# Patient Record
Sex: MALE | Race: WHITE | NOT HISPANIC OR LATINO | Employment: OTHER | URBAN - NONMETROPOLITAN AREA
[De-identification: names, ages, dates, MRNs, and addresses within clinical notes are randomized per-mention and may not be internally consistent; named-entity substitution may affect disease eponyms.]

---

## 2017-06-07 DIAGNOSIS — R97.20 ELEVATED PROSTATE SPECIFIC ANTIGEN (PSA): ICD-10-CM

## 2017-06-07 DIAGNOSIS — Z12.5 ENCOUNTER FOR SCREENING FOR MALIGNANT NEOPLASM OF PROSTATE: ICD-10-CM

## 2017-06-07 LAB — PSA SERPL-MCNC: 5.85 NG/ML (ref 0–4)

## 2017-06-29 ENCOUNTER — OFFICE VISIT (OUTPATIENT)
Dept: UROLOGY | Facility: CLINIC | Age: 72
End: 2017-06-29

## 2017-06-29 VITALS — TEMPERATURE: 97.9 F | WEIGHT: 186 LBS | BODY MASS INDEX: 28.19 KG/M2 | HEIGHT: 68 IN

## 2017-06-29 DIAGNOSIS — R97.20 ELEVATED PROSTATE SPECIFIC ANTIGEN (PSA): Primary | ICD-10-CM

## 2017-06-29 DIAGNOSIS — N52.9 ERECTILE DYSFUNCTION, UNSPECIFIED ERECTILE DYSFUNCTION TYPE: ICD-10-CM

## 2017-06-29 LAB
BILIRUB BLD-MCNC: NEGATIVE MG/DL
CLARITY, POC: CLEAR
COLOR UR: YELLOW
GLUCOSE UR STRIP-MCNC: NEGATIVE MG/DL
KETONES UR QL: NEGATIVE
LEUKOCYTE EST, POC: NEGATIVE
NITRITE UR-MCNC: NEGATIVE MG/ML
PH UR: 6 [PH] (ref 5–8)
PROT UR STRIP-MCNC: ABNORMAL MG/DL
RBC # UR STRIP: NEGATIVE /UL
SP GR UR: 1.02 (ref 1–1.03)
UROBILINOGEN UR QL: NORMAL

## 2017-06-29 PROCEDURE — 99214 OFFICE O/P EST MOD 30 MIN: CPT | Performed by: UROLOGY

## 2017-06-29 PROCEDURE — 81003 URINALYSIS AUTO W/O SCOPE: CPT | Performed by: UROLOGY

## 2017-06-29 NOTE — PROGRESS NOTES
Mr. Piedra is 72 y.o. male    CHIEF COMPLAINT: I am here for elevated psa    HPI  Elevated PSA  The patient presents with a presumed abnormality of the prostate gland, that is an elevated PSA.  This has been found in the context of Prior PSA elevation. Severity is best defined by the PSA level of 5.8 ng/ml. This test was done approximately 3weeks ago. Associated voiding symptom assessment reveals Frequency and Intermittency. He has undergone a prior prostate biopsy that was negative..       Previous PSA values include :   Lab Results   Component Value Date    PSA 5.850 (H) 06/07/2017      7.4      05/17     6.8      11/17     4.9      05/16        Lab Results   Component Value Date    PSA 5.850 (H) 06/07/2017     Erectile Dysfunction  Patient complains of erectile dysfunction. Onset of dysfunction was approximately  3 years ago and was gradual in onset.  Patient states the nature of difficulty is both attaining and maintaining erection.   Libido is not affected. Associated features include Hypertension. Previous treatment ofED include(s) Cialis   somewhat effective but it is cost prohibitive.    The following portions of the patient's history were reviewed and updated as appropriate: allergies, current medications, past family history, past medical history, past social history, past surgical history and problem list.    Review of Systems   Constitutional: Negative for chills and fever.   Gastrointestinal: Negative for abdominal pain, anal bleeding and blood in stool.   Genitourinary: Negative for flank pain and hematuria.         Current Outpatient Prescriptions:   •  aspirin 81 MG EC tablet, Take 81 mg by mouth Daily., Disp: , Rfl:   •  CIALIS 20 MG tablet, , Disp: , Rfl:   •  coenzyme Q10 100 MG capsule, Take 100 mg by mouth Daily., Disp: , Rfl:   •  hydrochlorothiazide (HYDRODIURIL) 25 MG tablet, , Disp: , Rfl:   •  irbesartan (AVAPRO) 300 MG tablet, , Disp: , Rfl:   •  lidocaine (XYLOCAINE) 2 % jelly, Apply  "to affected area daily prn, Disp: 30 mL, Rfl: 1  •  simvastatin (ZOCOR) 10 MG tablet, , Disp: , Rfl:     Past Medical History:   Diagnosis Date   • High cholesterol    • Hypertension        Past Surgical History:   Procedure Laterality Date   • HERNIA REPAIR      abdominal   • ROTATOR CUFF REPAIR     • TONSILLECTOMY         Social History     Social History   • Marital status:      Spouse name: N/A   • Number of children: N/A   • Years of education: N/A     Social History Main Topics   • Smoking status: Never Smoker   • Smokeless tobacco: None   • Alcohol use Yes   • Drug use: None   • Sexual activity: Not Asked     Other Topics Concern   • None     Social History Narrative       Family History   Problem Relation Age of Onset   • No Known Problems Father    • No Known Problems Mother        Temp 97.9 °F (36.6 °C)  Ht 68\" (172.7 cm)  Wt 186 lb (84.4 kg)  BMI 28.28 kg/m2    Physical Exam  Constitutional: The patient  is oriented to person, place, and time. They  appear well-developed and well-nourished. No distress.   Pulmonary/Chest: Effort normal.   Abdominal: Soft. The patient exhibits no distension and no mass. There is no tenderness. There is no rebound and no guarding. No hernia.   Neurological: Patient is alert and oriented to person, place, and time.   Skin: Skin is warm and dry. Not diaphoretic.   Psychiatric:  normal mood and affect.   Vitals reviewed.      Results for orders placed or performed in visit on 06/29/17   POC Urinalysis Dipstick, Automated   Result Value Ref Range    Color Yellow Yellow, Straw, Dark Yellow, Soni    Clarity, UA Clear Clear    Glucose, UA Negative Negative, 1000 mg/dL (3+) mg/dL    Bilirubin Negative Negative    Ketones, UA Negative Negative    Specific Gravity  1.025 1.005 - 1.030    Blood, UA Negative Negative    pH, Urine 6.0 5.0 - 8.0    Protein, POC Trace (A) Negative mg/dL    Urobilinogen, UA Normal Normal    Leukocytes Negative Negative    Nitrite, UA Negative " "Negative       Imaging Results (last 7 days)     ** No results found for the last 168 hours. **          Assessment and Plan  Diagnoses and all orders for this visit:    Elevated prostate specific antigen (PSA)  -     POC Urinalysis Dipstick, Automated  -     PSA; Future    Erectile dysfunction, unspecified erectile dysfunction type    He has two chronic Urologic issues with the latter being new to me.     #1. His PSA has improved. He had a biopsy done in December.     #2. The patient and I discussed the likely etiology of his impotence.  We discussed diagnostic evaluation for erectile dysfunction is possible but usually does not change the management.  He understands that goal directed therapy is probably the best approach since no matter what the etiology, a PDE 5 inhibitor is the least invasive way to manage ED.  While there are risks to PDE 5 inhibitors which I discussed as described below, it was relayed to him that they probably provide the most \"natural\" erection.  Other options discussed included penile injections, urethral suppositories, vacuum erection device without or without the tension ring, and penile prosthesis.  The risks of headache, visual changes, hypotension, priapism, GI distress, myalgias, and the contraindication of nitrates were discussed with the patient.  The patient denies a history of nitrates either in long acting or prn use form.  He denies a history of MI, heart failure, or stroke in the last 6 months.  I discussed with him that alpha blockers should be taken at least 4 hours apart from the PDE 5 inhibitors.  He was given a prescription for sildenafil troches. I reminded him that facial flushing is not usual with this medication, and that it is most effective when taken on an empty stomach without alcohol.  He understands that he needs to be forthright about using this medication in any medical situation, especially when giving nitrates is being considered.    Chemo Tierney, " MD  06/29/17  12:04 PM      Cc:

## 2017-07-24 ENCOUNTER — HOSPITAL ENCOUNTER (OUTPATIENT)
Dept: GENERAL RADIOLOGY | Facility: HOSPITAL | Age: 72
Discharge: HOME | End: 2017-07-24
Attending: INTERNAL MEDICINE | Admitting: INTERNAL MEDICINE

## 2017-07-24 ENCOUNTER — TELEPHONE (OUTPATIENT)
Dept: INTERNAL MEDICINE | Age: 72
End: 2017-07-24

## 2017-07-24 ENCOUNTER — TRANSCRIBE ORDERS (OUTPATIENT)
Dept: ADMINISTRATIVE | Facility: HOSPITAL | Age: 72
End: 2017-07-24

## 2017-07-24 DIAGNOSIS — J45.909 INTRINSIC ASTHMA, UNSPECIFIED: Primary | ICD-10-CM

## 2017-07-24 PROCEDURE — 71020 HC CHEST PA AND LATERAL: CPT

## 2017-07-24 RX ORDER — ALBUTEROL SULFATE 90 UG/1
2 AEROSOL, METERED RESPIRATORY (INHALATION) EVERY 4 HOURS PRN
Qty: 1 INHALER | Refills: 3 | Status: SHIPPED | OUTPATIENT
Start: 2017-07-24

## 2017-07-25 ENCOUNTER — LAB (OUTPATIENT)
Dept: LAB | Facility: HOSPITAL | Age: 72
End: 2017-07-25
Attending: INTERNAL MEDICINE

## 2017-07-25 DIAGNOSIS — J45.909 INTRINSIC ASTHMA, UNSPECIFIED: ICD-10-CM

## 2017-07-25 PROCEDURE — 87102 FUNGUS ISOLATION CULTURE: CPT | Performed by: INTERNAL MEDICINE

## 2017-07-25 PROCEDURE — 87205 SMEAR GRAM STAIN: CPT | Performed by: INTERNAL MEDICINE

## 2017-07-25 PROCEDURE — 87116 MYCOBACTERIA CULTURE: CPT | Performed by: INTERNAL MEDICINE

## 2017-07-25 PROCEDURE — 87070 CULTURE OTHR SPECIMN AEROBIC: CPT | Performed by: INTERNAL MEDICINE

## 2017-07-25 PROCEDURE — 87206 SMEAR FLUORESCENT/ACID STAI: CPT | Performed by: INTERNAL MEDICINE

## 2017-07-26 RX ORDER — CETIRIZINE HCL/PSEUDOEPHEDRINE 5 MG-120MG
1 TABLET, EXTENDED RELEASE 12 HR ORAL DAILY PRN
Refills: 0 | COMMUNITY
Start: 2017-06-11 | End: 2017-08-01

## 2017-07-26 RX ORDER — HYDROCHLOROTHIAZIDE 25 MG/1
1 TABLET ORAL DAILY
COMMUNITY
Start: 2017-07-11 | End: 2018-04-07 | Stop reason: SDUPTHER

## 2017-07-26 RX ORDER — TADALAFIL 20 MG
0.5 TABLET ORAL DAILY PRN
COMMUNITY
Start: 2017-06-01 | End: 2018-01-02

## 2017-07-26 RX ORDER — SIMVASTATIN 10 MG
1 TABLET ORAL DAILY
COMMUNITY
Start: 2017-07-11 | End: 2018-01-18 | Stop reason: SDUPTHER

## 2017-07-26 RX ORDER — IRBESARTAN 300 MG/1
1 TABLET ORAL DAILY
COMMUNITY
Start: 2017-07-11 | End: 2018-04-07 | Stop reason: SDUPTHER

## 2017-07-27 LAB
BACTERIA SPEC RESP CULT: NORMAL
GRAM STN SPEC: NORMAL

## 2017-08-01 ENCOUNTER — OFFICE VISIT (OUTPATIENT)
Dept: INTERNAL MEDICINE | Age: 72
End: 2017-08-01
Payer: MEDICARE

## 2017-08-01 VITALS
SYSTOLIC BLOOD PRESSURE: 126 MMHG | BODY MASS INDEX: 27.55 KG/M2 | OXYGEN SATURATION: 96 % | HEART RATE: 94 BPM | HEIGHT: 69 IN | WEIGHT: 186 LBS | DIASTOLIC BLOOD PRESSURE: 62 MMHG

## 2017-08-01 DIAGNOSIS — J30.9 ALLERGIC RHINITIS, UNSPECIFIED ALLERGIC RHINITIS TRIGGER, UNSPECIFIED RHINITIS SEASONALITY: ICD-10-CM

## 2017-08-01 DIAGNOSIS — J45.30 ASTHMATIC BRONCHITIS, MILD PERSISTENT, UNCOMPLICATED: ICD-10-CM

## 2017-08-01 DIAGNOSIS — I10 ESSENTIAL HYPERTENSION: Primary | ICD-10-CM

## 2017-08-01 PROCEDURE — 1036F TOBACCO NON-USER: CPT | Performed by: INTERNAL MEDICINE

## 2017-08-01 PROCEDURE — 4040F PNEUMOC VAC/ADMIN/RCVD: CPT | Performed by: INTERNAL MEDICINE

## 2017-08-01 PROCEDURE — 99214 OFFICE O/P EST MOD 30 MIN: CPT | Performed by: INTERNAL MEDICINE

## 2017-08-01 PROCEDURE — G8427 DOCREV CUR MEDS BY ELIG CLIN: HCPCS | Performed by: INTERNAL MEDICINE

## 2017-08-01 PROCEDURE — 1123F ACP DISCUSS/DSCN MKR DOCD: CPT | Performed by: INTERNAL MEDICINE

## 2017-08-01 PROCEDURE — G8419 CALC BMI OUT NRM PARAM NOF/U: HCPCS | Performed by: INTERNAL MEDICINE

## 2017-08-01 PROCEDURE — 3017F COLORECTAL CA SCREEN DOC REV: CPT | Performed by: INTERNAL MEDICINE

## 2017-08-01 RX ORDER — FEXOFENADINE HCL 180 MG/1
180 TABLET ORAL DAILY
COMMUNITY

## 2017-08-01 ASSESSMENT — ENCOUNTER SYMPTOMS
RHINORRHEA: 0
COUGH: 0
SHORTNESS OF BREATH: 0
ABDOMINAL PAIN: 0
WHEEZING: 1
NAUSEA: 0
SORE THROAT: 0
TROUBLE SWALLOWING: 0

## 2017-08-11 ENCOUNTER — TRANSCRIBE ORDERS (OUTPATIENT)
Dept: ADMINISTRATIVE | Facility: HOSPITAL | Age: 72
End: 2017-08-11

## 2017-08-11 DIAGNOSIS — J98.4 APICAL LUNG SCARRING: ICD-10-CM

## 2017-08-11 DIAGNOSIS — J98.4 OTHER DISEASES OF LUNG, NOT ELSEWHERE CLASSIFIED: Primary | ICD-10-CM

## 2017-08-30 ENCOUNTER — HOSPITAL ENCOUNTER (OUTPATIENT)
Dept: CT IMAGING | Facility: HOSPITAL | Age: 72
Discharge: HOME | End: 2017-08-30
Admitting: NURSE PRACTITIONER

## 2017-08-30 ENCOUNTER — APPOINTMENT (OUTPATIENT)
Dept: CT IMAGING | Facility: HOSPITAL | Age: 72
End: 2017-08-30

## 2017-08-30 DIAGNOSIS — J98.4 APICAL LUNG SCARRING: ICD-10-CM

## 2017-08-30 PROCEDURE — 71250 CT THORAX DX C-: CPT

## 2017-09-05 LAB
FUNGUS WND CULT: NORMAL
MYCOBACTERIUM SPEC CULT: NORMAL
NIGHT BLUE STAIN TISS: NORMAL
NIGHT BLUE STAIN TISS: NORMAL

## 2017-11-06 DIAGNOSIS — E78.00 PURE HYPERCHOLESTEROLEMIA: Primary | ICD-10-CM

## 2017-11-07 DIAGNOSIS — E78.00 PURE HYPERCHOLESTEROLEMIA: ICD-10-CM

## 2017-11-07 LAB
ALBUMIN SERPL-MCNC: 4.4 G/DL (ref 3.5–5.2)
ALP BLD-CCNC: 167 U/L (ref 40–130)
ALT SERPL-CCNC: 46 U/L (ref 5–41)
ANION GAP SERPL CALCULATED.3IONS-SCNC: 19 MMOL/L (ref 7–19)
AST SERPL-CCNC: 35 U/L (ref 5–40)
BILIRUB SERPL-MCNC: 1.3 MG/DL (ref 0.2–1.2)
BUN BLDV-MCNC: 19 MG/DL (ref 8–23)
CALCIUM SERPL-MCNC: 9.5 MG/DL (ref 8.8–10.2)
CHLORIDE BLD-SCNC: 101 MMOL/L (ref 98–111)
CO2: 25 MMOL/L (ref 22–29)
CREAT SERPL-MCNC: 0.8 MG/DL (ref 0.5–1.2)
GFR NON-AFRICAN AMERICAN: >60
GLUCOSE BLD-MCNC: 96 MG/DL (ref 74–109)
LDL CHOLESTEROL DIRECT: 69 MG/DL
POTASSIUM SERPL-SCNC: 4.4 MMOL/L (ref 3.5–5)
SODIUM BLD-SCNC: 145 MMOL/L (ref 136–145)
TOTAL PROTEIN: 7.7 G/DL (ref 6.6–8.7)

## 2017-12-22 DIAGNOSIS — R97.20 ELEVATED PROSTATE SPECIFIC ANTIGEN (PSA): Primary | ICD-10-CM

## 2017-12-22 LAB — PSA SERPL-MCNC: 6.52 NG/ML (ref 0–4)

## 2017-12-26 ENCOUNTER — RESULTS ENCOUNTER (OUTPATIENT)
Dept: UROLOGY | Facility: CLINIC | Age: 72
End: 2017-12-26

## 2017-12-26 DIAGNOSIS — R97.20 ELEVATED PROSTATE SPECIFIC ANTIGEN (PSA): ICD-10-CM

## 2018-01-02 ENCOUNTER — OFFICE VISIT (OUTPATIENT)
Dept: INTERNAL MEDICINE | Age: 73
End: 2018-01-02
Payer: MEDICARE

## 2018-01-02 VITALS
HEIGHT: 68 IN | HEART RATE: 75 BPM | DIASTOLIC BLOOD PRESSURE: 72 MMHG | SYSTOLIC BLOOD PRESSURE: 108 MMHG | BODY MASS INDEX: 30.08 KG/M2 | OXYGEN SATURATION: 98 % | WEIGHT: 198.5 LBS

## 2018-01-02 DIAGNOSIS — Z23 NEED FOR PROPHYLACTIC VACCINATION AGAINST DIPHTHERIA-TETANUS-PERTUSSIS (DTP): ICD-10-CM

## 2018-01-02 DIAGNOSIS — I10 ESSENTIAL HYPERTENSION: ICD-10-CM

## 2018-01-02 DIAGNOSIS — E66.3 PATIENT OVERWEIGHT: ICD-10-CM

## 2018-01-02 DIAGNOSIS — R97.20 ELEVATED PSA: ICD-10-CM

## 2018-01-02 DIAGNOSIS — Z23 NEED FOR TETANUS BOOSTER: ICD-10-CM

## 2018-01-02 DIAGNOSIS — E78.00 PURE HYPERCHOLESTEROLEMIA: ICD-10-CM

## 2018-01-02 PROCEDURE — 99214 OFFICE O/P EST MOD 30 MIN: CPT | Performed by: INTERNAL MEDICINE

## 2018-01-02 RX ORDER — FLUTICASONE PROPIONATE 50 MCG
1 SPRAY, SUSPENSION (ML) NASAL DAILY
COMMUNITY

## 2018-01-02 RX ORDER — INFLUENZA A VIRUS A/MICHIGAN/45/2015 X-275 (H1N1) ANTIGEN (FORMALDEHYDE INACTIVATED), INFLUENZA A VIRUS A/SINGAPORE/INFIMH-16-0019/2016 IVR-186 (H3N2) ANTIGEN (FORMALDEHYDE INACTIVATED), AND INFLUENZA B VIRUS B/MARYLAND/15/2016 BX-69A (A B/COLORADO/6/2017-LIKE VIRUS) ANTIGEN (FORMALDEHYDE INACTIVATED) 60; 60; 60 UG/.5ML; UG/.5ML; UG/.5ML
INJECTION, SUSPENSION INTRAMUSCULAR
Refills: 0 | COMMUNITY
Start: 2017-10-03

## 2018-01-02 RX ORDER — ZOSTER VACCINE LIVE 19400 [PFU]/.65ML
INJECTION, POWDER, LYOPHILIZED, FOR SUSPENSION SUBCUTANEOUS
COMMUNITY
Start: 2017-12-20

## 2018-01-02 RX ORDER — SILDENAFIL 100 MG/1
100 TABLET, FILM COATED ORAL PRN
COMMUNITY

## 2018-01-02 ASSESSMENT — ENCOUNTER SYMPTOMS
COUGH: 0
SHORTNESS OF BREATH: 0
TROUBLE SWALLOWING: 0
NAUSEA: 0
RHINORRHEA: 0
ABDOMINAL PAIN: 0
SORE THROAT: 0

## 2018-01-02 ASSESSMENT — PATIENT HEALTH QUESTIONNAIRE - PHQ9
1. LITTLE INTEREST OR PLEASURE IN DOING THINGS: 0
SUM OF ALL RESPONSES TO PHQ9 QUESTIONS 1 & 2: 0
SUM OF ALL RESPONSES TO PHQ QUESTIONS 1-9: 0
2. FEELING DOWN, DEPRESSED OR HOPELESS: 0

## 2018-01-02 NOTE — PATIENT INSTRUCTIONS
Patient Education        Heart-Healthy Diet: Care Instructions  Your Care Instructions    A heart-healthy diet has lots of vegetables, fruits, nuts, beans, and whole grains, and is low in salt. It limits foods that are high in saturated fat, such as meats, cheeses, and fried foods. It may be hard to change your diet, but even small changes can lower your risk of heart attack and heart disease. Follow-up care is a key part of your treatment and safety. Be sure to make and go to all appointments, and call your doctor if you are having problems. It's also a good idea to know your test results and keep a list of the medicines you take. How can you care for yourself at home? Watch your portions  · Learn what a serving is. A \"serving\" and a \"portion\" are not always the same thing. Make sure that you are not eating larger portions than are recommended. For example, a serving of pasta is ½ cup. A serving size of meat is 2 to 3 ounces. A 3-ounce serving is about the size of a deck of cards. Measure serving sizes until you are good at [place]" them. Keep in mind that restaurants often serve portions that are 2 or 3 times the size of one serving. · To keep your energy level up and keep you from feeling hungry, eat often but in smaller portions. · Eat only the number of calories you need to stay at a healthy weight. If you need to lose weight, eat fewer calories than your body burns (through exercise and other physical activity). Eat more fruits and vegetables  · Eat a variety of fruit and vegetables every day. Dark green, deep orange, red, or yellow fruits and vegetables are especially good for you. Examples include spinach, carrots, peaches, and berries. · Keep carrots, celery, and other veggies handy for snacks. Buy fruit that is in season and store it where you can see it so that you will be tempted to eat it. · Cook dishes that have a lot of veggies in them, such as stir-fries and soups.   Limit saturated and trans fat  · Read food labels, and try to avoid saturated and trans fats. They increase your risk of heart disease. Trans fat is found in many processed foods such as cookies and crackers. · Use olive or canola oil when you cook. Try cholesterol-lowering spreads, such as Benecol or Take Control. · Bake, broil, grill, or steam foods instead of frying them. · Choose lean meats instead of high-fat meats such as hot dogs and sausages. Cut off all visible fat when you prepare meat. · Eat fish, skinless poultry, and meat alternatives such as soy products instead of high-fat meats. Soy products, such as tofu, may be especially good for your heart. · Choose low-fat or fat-free milk and dairy products. Eat fish  · Eat at least two servings of fish a week. Certain fish, such as salmon and tuna, contain omega-3 fatty acids, which may help reduce your risk of heart attack. Eat foods high in fiber  · Eat a variety of grain products every day. Include whole-grain foods that have lots of fiber and nutrients. Examples of whole-grain foods include oats, whole wheat bread, and brown rice. · Buy whole-grain breads and cereals, instead of white bread or pastries. Limit salt and sodium  · Limit how much salt and sodium you eat to help lower your blood pressure. · Taste food before you salt it. Add only a little salt when you think you need it. With time, your taste buds will adjust to less salt. · Eat fewer snack items, fast foods, and other high-salt, processed foods. Check food labels for the amount of sodium in packaged foods. · Choose low-sodium versions of canned goods (such as soups, vegetables, and beans). Limit sugar  · Limit drinks and foods with added sugar. These include candy, desserts, and soda pop. Limit alcohol  · Limit alcohol to no more than 2 drinks a day for men and 1 drink a day for women. Too much alcohol can cause health problems. When should you call for help?   Watch closely for changes in your

## 2018-01-11 ENCOUNTER — OFFICE VISIT (OUTPATIENT)
Dept: UROLOGY | Facility: CLINIC | Age: 73
End: 2018-01-11

## 2018-01-11 VITALS — BODY MASS INDEX: 30.01 KG/M2 | HEIGHT: 68 IN | TEMPERATURE: 97 F | WEIGHT: 198 LBS

## 2018-01-11 DIAGNOSIS — N52.9 ERECTILE DYSFUNCTION, UNSPECIFIED ERECTILE DYSFUNCTION TYPE: ICD-10-CM

## 2018-01-11 DIAGNOSIS — R97.20 ELEVATED PROSTATE SPECIFIC ANTIGEN (PSA): Primary | ICD-10-CM

## 2018-01-11 LAB
BILIRUB BLD-MCNC: NEGATIVE MG/DL
CLARITY, POC: CLEAR
COLOR UR: YELLOW
GLUCOSE UR STRIP-MCNC: NEGATIVE MG/DL
KETONES UR QL: ABNORMAL
LEUKOCYTE EST, POC: NEGATIVE
NITRITE UR-MCNC: NEGATIVE MG/ML
PH UR: 6.5 [PH] (ref 5–8)
PROT UR STRIP-MCNC: ABNORMAL MG/DL
RBC # UR STRIP: NEGATIVE /UL
SP GR UR: 1.02 (ref 1–1.03)
UROBILINOGEN UR QL: NORMAL

## 2018-01-11 PROCEDURE — 81003 URINALYSIS AUTO W/O SCOPE: CPT | Performed by: UROLOGY

## 2018-01-11 PROCEDURE — 99213 OFFICE O/P EST LOW 20 MIN: CPT | Performed by: UROLOGY

## 2018-01-11 NOTE — PROGRESS NOTES
Mr. Piedra is 72 y.o. male    CHIEF COMPLAINT: I am here to follow up on my elevated ps    HPI  Elevated PSA  The patient presents with a presumed abnormality of the prostate gland, that is an elevated PSA.  This has been found in the context of Prior PSA elevation. Severity is best defined by the PSA level of 6.5 ng/ml. This test was done approximately 1week ago. Associated voiding symptom assessment reveals Frequency. He is undergone a standard 12 core template biopsy that was negative..       Previous PSA values include :   Lab Results   Component Value Date    PSA 6.520 (H) 12/22/2017    PSA 5.850 (H) 06/07/2017         Lab Results   Component Value Date    PSA 6.520 (H) 12/22/2017    PSA 5.850 (H) 06/07/2017      6.8      11/16     4.7      11/15  He has used the sildenafil troches for erectile dysfunction.  He has good success with this.    The following portions of the patient's history were reviewed and updated as appropriate: allergies, current medications, past family history, past medical history, past social history, past surgical history and problem list.    Review of Systems   Constitutional: Negative for chills and fever.   Gastrointestinal: Negative for abdominal pain, anal bleeding and blood in stool.   Genitourinary: Negative for flank pain and hematuria.         Current Outpatient Prescriptions:   •  aspirin 81 MG EC tablet, Take 81 mg by mouth Daily., Disp: , Rfl:   •  CIALIS 20 MG tablet, , Disp: , Rfl:   •  coenzyme Q10 100 MG capsule, Take 100 mg by mouth Daily., Disp: , Rfl:   •  hydrochlorothiazide (HYDRODIURIL) 25 MG tablet, , Disp: , Rfl:   •  irbesartan (AVAPRO) 300 MG tablet, , Disp: , Rfl:   •  lidocaine (XYLOCAINE) 2 % jelly, Apply to affected area daily prn, Disp: 30 mL, Rfl: 1  •  simvastatin (ZOCOR) 10 MG tablet, , Disp: , Rfl:     Past Medical History:   Diagnosis Date   • High cholesterol    • Hypertension        Past Surgical History:   Procedure Laterality Date   • HERNIA  "REPAIR      abdominal   • PROSTATE ULTRASOUND BIOPSY  12/2016    negative   • ROTATOR CUFF REPAIR     • TONSILLECTOMY         Social History     Social History   • Marital status:      Spouse name: N/A   • Number of children: N/A   • Years of education: N/A     Social History Main Topics   • Smoking status: Never Smoker   • Smokeless tobacco: Never Used   • Alcohol use Yes   • Drug use: None   • Sexual activity: Not Asked     Other Topics Concern   • None     Social History Narrative       Family History   Problem Relation Age of Onset   • No Known Problems Father    • No Known Problems Mother        Temp 97 °F (36.1 °C)  Ht 172.7 cm (68\")  Wt 89.8 kg (198 lb)  BMI 30.11 kg/m2    Physical Exam  Alert and oriented ×3  Not agitated or distressed  No obvious deformities  No respiratory distress  Skin without pallor or diaphoresis      Results for orders placed or performed in visit on 01/11/18   POC Urinalysis Dipstick, Automated   Result Value Ref Range    Color Yellow Yellow, Straw, Dark Yellow, Soni    Clarity, UA Clear Clear    Glucose, UA Negative Negative, 1000 mg/dL (3+) mg/dL    Bilirubin Negative Negative    Ketones, UA Trace (A) Negative    Specific Gravity  1.025 1.005 - 1.030    Blood, UA Negative Negative    pH, Urine 6.5 5.0 - 8.0    Protein, POC Trace (A) Negative mg/dL    Urobilinogen, UA Normal Normal    Leukocytes Negative Negative    Nitrite, UA Negative Negative       Imaging Results (last 7 days)     ** No results found for the last 168 hours. **          Assessment and Plan  Diagnoses and all orders for this visit:    Elevated prostate specific antigen (PSA)  -     POC Urinalysis Dipstick, Automated  -     PSA DIAGNOSTIC; Future    Erectile dysfunction, unspecified erectile dysfunction type    #1.  His PSA did rise somewhat but this is only a slight worsening.  This is still below the level that made us do a biopsy at 6.8.  Current PSA 6.5 will be followed 6 months with a repeat PSA and " digital rectal exam.  If he continued rising to obtain an MRI of the pelvis from prostate area.  #2.  His erectile dysfunction remains stable on the sildenafil troches which will be continued.        Chemo Tierney MD  01/11/18  10:51 AM      Cc: Andrea Jorgensen MD

## 2018-01-19 RX ORDER — SIMVASTATIN 10 MG
TABLET ORAL
Qty: 90 TABLET | Refills: 3 | Status: SHIPPED | OUTPATIENT
Start: 2018-01-19

## 2018-02-15 ENCOUNTER — TRANSCRIBE ORDERS (OUTPATIENT)
Dept: ADMINISTRATIVE | Facility: HOSPITAL | Age: 73
End: 2018-02-15

## 2018-02-15 DIAGNOSIS — J98.4 SCARRING OF LUNG: Primary | ICD-10-CM

## 2018-03-14 ENCOUNTER — HOSPITAL ENCOUNTER (OUTPATIENT)
Dept: CT IMAGING | Facility: HOSPITAL | Age: 73
Discharge: HOME | End: 2018-03-14
Admitting: NURSE PRACTITIONER

## 2018-03-14 DIAGNOSIS — J98.4 SCARRING OF LUNG: ICD-10-CM

## 2018-03-14 PROCEDURE — 71250 CT THORAX DX C-: CPT

## 2018-03-16 ENCOUNTER — TELEPHONE (OUTPATIENT)
Dept: INTERNAL MEDICINE | Age: 73
End: 2018-03-16

## 2018-03-16 NOTE — TELEPHONE ENCOUNTER
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

## 2018-03-19 ENCOUNTER — APPOINTMENT (OUTPATIENT)
Dept: LAB | Facility: HOSPITAL | Age: 73
End: 2018-03-19
Attending: INTERNAL MEDICINE

## 2018-03-19 ENCOUNTER — TRANSCRIBE ORDERS (OUTPATIENT)
Dept: ADMINISTRATIVE | Facility: HOSPITAL | Age: 73
End: 2018-03-19

## 2018-03-19 DIAGNOSIS — R05.9 COUGH: Primary | ICD-10-CM

## 2018-03-19 PROCEDURE — 87186 SC STD MICRODIL/AGAR DIL: CPT

## 2018-03-19 PROCEDURE — 87116 MYCOBACTERIA CULTURE: CPT | Performed by: INTERNAL MEDICINE

## 2018-03-19 PROCEDURE — 87206 SMEAR FLUORESCENT/ACID STAI: CPT | Performed by: INTERNAL MEDICINE

## 2018-03-19 PROCEDURE — 87070 CULTURE OTHR SPECIMN AEROBIC: CPT | Performed by: INTERNAL MEDICINE

## 2018-03-19 PROCEDURE — 87205 SMEAR GRAM STAIN: CPT | Performed by: INTERNAL MEDICINE

## 2018-03-19 PROCEDURE — 87181 SC STD AGAR DILUTION PER AGT: CPT

## 2018-03-19 PROCEDURE — 87102 FUNGUS ISOLATION CULTURE: CPT | Performed by: INTERNAL MEDICINE

## 2018-03-21 LAB
BACTERIA SPEC RESP CULT: NORMAL
BACTERIA SPEC RESP CULT: NORMAL
GRAM STN SPEC: NORMAL

## 2018-04-02 ENCOUNTER — TELEPHONE (OUTPATIENT)
Dept: INTERNAL MEDICINE | Age: 73
End: 2018-04-02

## 2018-04-09 RX ORDER — HYDROCHLOROTHIAZIDE 25 MG/1
TABLET ORAL
Qty: 90 TABLET | Refills: 3 | Status: SHIPPED | OUTPATIENT
Start: 2018-04-09

## 2018-04-09 RX ORDER — IRBESARTAN 300 MG/1
TABLET ORAL
Qty: 90 TABLET | Refills: 3 | Status: SHIPPED | OUTPATIENT
Start: 2018-04-09

## 2018-04-20 ENCOUNTER — APPOINTMENT (OUTPATIENT)
Dept: LAB | Facility: HOSPITAL | Age: 73
End: 2018-04-20
Attending: INTERNAL MEDICINE

## 2018-04-20 ENCOUNTER — TRANSCRIBE ORDERS (OUTPATIENT)
Dept: ADMINISTRATIVE | Facility: HOSPITAL | Age: 73
End: 2018-04-20

## 2018-04-20 DIAGNOSIS — A31.0 PULMONARY MYCOBACTERIUM AVIUM COMPLEX (MAC) INFECTION (HCC): Primary | ICD-10-CM

## 2018-04-20 LAB — KOH PREP NAIL: NORMAL

## 2018-04-20 PROCEDURE — 87070 CULTURE OTHR SPECIMN AEROBIC: CPT | Performed by: INTERNAL MEDICINE

## 2018-04-20 PROCEDURE — 87220 TISSUE EXAM FOR FUNGI: CPT | Performed by: INTERNAL MEDICINE

## 2018-04-20 PROCEDURE — 87102 FUNGUS ISOLATION CULTURE: CPT | Performed by: INTERNAL MEDICINE

## 2018-04-20 PROCEDURE — 87116 MYCOBACTERIA CULTURE: CPT | Performed by: INTERNAL MEDICINE

## 2018-04-20 PROCEDURE — 87206 SMEAR FLUORESCENT/ACID STAI: CPT | Performed by: INTERNAL MEDICINE

## 2018-04-20 PROCEDURE — 87205 SMEAR GRAM STAIN: CPT | Performed by: INTERNAL MEDICINE

## 2018-04-21 PROCEDURE — 87206 SMEAR FLUORESCENT/ACID STAI: CPT | Performed by: INTERNAL MEDICINE

## 2018-04-21 PROCEDURE — 87116 MYCOBACTERIA CULTURE: CPT | Performed by: INTERNAL MEDICINE

## 2018-04-22 LAB
BACTERIA SPEC RESP CULT: NORMAL
BACTERIA SPEC RESP CULT: NORMAL
GRAM STN SPEC: NORMAL

## 2018-04-23 ENCOUNTER — TRANSCRIBE ORDERS (OUTPATIENT)
Dept: ADMINISTRATIVE | Facility: HOSPITAL | Age: 73
End: 2018-04-23

## 2018-04-23 ENCOUNTER — APPOINTMENT (OUTPATIENT)
Dept: LAB | Facility: HOSPITAL | Age: 73
End: 2018-04-23
Attending: INTERNAL MEDICINE

## 2018-04-23 DIAGNOSIS — A31.0 PULMONARY MYCOBACTERIUM AVIUM INFECTION (HCC): ICD-10-CM

## 2018-04-23 PROCEDURE — 87206 SMEAR FLUORESCENT/ACID STAI: CPT | Performed by: INTERNAL MEDICINE

## 2018-04-23 PROCEDURE — 87116 MYCOBACTERIA CULTURE: CPT | Performed by: INTERNAL MEDICINE

## 2018-04-24 ENCOUNTER — CLINICAL SUPPORT (OUTPATIENT)
Dept: OTOLARYNGOLOGY | Facility: CLINIC | Age: 73
End: 2018-04-24

## 2018-04-24 ENCOUNTER — OFFICE VISIT (OUTPATIENT)
Dept: OTOLARYNGOLOGY | Facility: CLINIC | Age: 73
End: 2018-04-24

## 2018-04-24 VITALS
WEIGHT: 207 LBS | DIASTOLIC BLOOD PRESSURE: 83 MMHG | HEART RATE: 75 BPM | SYSTOLIC BLOOD PRESSURE: 130 MMHG | TEMPERATURE: 97.9 F | HEIGHT: 68 IN | BODY MASS INDEX: 31.37 KG/M2

## 2018-04-24 DIAGNOSIS — H61.23 BILATERAL IMPACTED CERUMEN: ICD-10-CM

## 2018-04-24 DIAGNOSIS — R05.3 CHRONIC COUGH: ICD-10-CM

## 2018-04-24 DIAGNOSIS — J30.9 ALLERGIC RHINITIS, UNSPECIFIED CHRONICITY, UNSPECIFIED SEASONALITY, UNSPECIFIED TRIGGER: ICD-10-CM

## 2018-04-24 DIAGNOSIS — R09.89 THROAT CLEARING: ICD-10-CM

## 2018-04-24 DIAGNOSIS — J32.9 CHRONIC SINUSITIS, UNSPECIFIED LOCATION: ICD-10-CM

## 2018-04-24 DIAGNOSIS — J32.9 CHRONIC SINUSITIS, UNSPECIFIED LOCATION: Primary | ICD-10-CM

## 2018-04-24 DIAGNOSIS — J34.89 CONCHA BULLOSA: ICD-10-CM

## 2018-04-24 PROCEDURE — 70486 CT MAXILLOFACIAL W/O DYE: CPT | Performed by: NURSE PRACTITIONER

## 2018-04-24 PROCEDURE — 99204 OFFICE O/P NEW MOD 45 MIN: CPT | Performed by: NURSE PRACTITIONER

## 2018-04-24 PROCEDURE — 31575 DIAGNOSTIC LARYNGOSCOPY: CPT | Performed by: NURSE PRACTITIONER

## 2018-04-24 RX ORDER — FLUTICASONE PROPIONATE 50 MCG
SPRAY, SUSPENSION (ML) NASAL
COMMUNITY
End: 2018-08-01

## 2018-04-24 RX ORDER — MONTELUKAST SODIUM 10 MG/1
10 TABLET ORAL NIGHTLY
COMMUNITY
Start: 2018-04-11 | End: 2018-11-06

## 2018-04-24 RX ORDER — FEXOFENADINE HCL 180 MG/1
180 TABLET ORAL DAILY
COMMUNITY
End: 2022-11-15

## 2018-04-24 RX ORDER — ALBUTEROL SULFATE 90 UG/1
2 AEROSOL, METERED RESPIRATORY (INHALATION) EVERY 4 HOURS PRN
COMMUNITY
Start: 2017-07-24 | End: 2018-11-06

## 2018-04-24 RX ORDER — AZELASTINE 1 MG/ML
2 SPRAY, METERED NASAL 2 TIMES DAILY
COMMUNITY
End: 2018-08-01

## 2018-04-24 NOTE — PATIENT INSTRUCTIONS
Discussed reducing or weaning a few of the allergy meds - specifically the astelin if he plans to continue allegra.  Also may just need Allegra or Singulair.  Recommend to discuss this with ordering physician.    Be mindful of possible reflux - institute acid reflux precautions and if begins to have symptoms consider treatment.    Recommend to continue with current pulmonary therapy/ID tx      Gastroesophageal Reflux Disease, Adult  Normally, food travels down the esophagus and stays in the stomach to be digested. However, when a person has gastroesophageal reflux disease (GERD), food and stomach acid move back up into the esophagus. When this happens, the esophagus becomes sore and inflamed. Over time, GERD can create small holes (ulcers) in the lining of the esophagus.  What are the causes?  This condition is caused by a problem with the muscle between the esophagus and the stomach (lower esophageal sphincter, or LES). Normally, the LES muscle closes after food passes through the esophagus to the stomach. When the LES is weakened or abnormal, it does not close properly, and that allows food and stomach acid to go back up into the esophagus. The LES can be weakened by certain dietary substances, medicines, and medical conditions, including:  · Tobacco use.  · Pregnancy.  · Having a hiatal hernia.  · Heavy alcohol use.  · Certain foods and beverages, such as coffee, chocolate, onions, and peppermint.  What increases the risk?  This condition is more likely to develop in:  · People who have an increased body weight.  · People who have connective tissue disorders.  · People who use NSAID medicines.  What are the signs or symptoms?  Symptoms of this condition include:  · Heartburn.  · Difficult or painful swallowing.  · The feeling of having a lump in the throat.  · A bitter taste in the mouth.  · Bad breath.  · Having a large amount of saliva.  · Having an upset or bloated stomach.  · Belching.  · Chest  pain.  · Shortness of breath or wheezing.  · Ongoing (chronic) cough or a night-time cough.  · Wearing away of tooth enamel.  · Weight loss.  Different conditions can cause chest pain. Make sure to see your health care provider if you experience chest pain.  How is this diagnosed?  Your health care provider will take a medical history and perform a physical exam. To determine if you have mild or severe GERD, your health care provider may also monitor how you respond to treatment. You may also have other tests, including:  · An endoscopy to examine your stomach and esophagus with a small camera.  · A test that measures the acidity level in your esophagus.  · A test that measures how much pressure is on your esophagus.  · A barium swallow or modified barium swallow to show the shape, size, and functioning of your esophagus.  How is this treated?  The goal of treatment is to help relieve your symptoms and to prevent complications. Treatment for this condition may vary depending on how severe your symptoms are. Your health care provider may recommend:  · Changes to your diet.  · Medicine.  · Surgery.  Follow these instructions at home:  Diet   · Follow a diet as recommended by your health care provider. This may involve avoiding foods and drinks such as:  ¨ Coffee and tea (with or without caffeine).  ¨ Drinks that contain alcohol.  ¨ Energy drinks and sports drinks.  ¨ Carbonated drinks or sodas.  ¨ Chocolate and cocoa.  ¨ Peppermint and mint flavorings.  ¨ Garlic and onions.  ¨ Horseradish.  ¨ Spicy and acidic foods, including peppers, chili powder, ferraro powder, vinegar, hot sauces, and barbecue sauce.  ¨ Citrus fruit juices and citrus fruits, such as oranges, fanny, and limes.  ¨ Tomato-based foods, such as red sauce, chili, salsa, and pizza with red sauce.  ¨ Fried and fatty foods, such as donuts, french fries, potato chips, and high-fat dressings.  ¨ High-fat meats, such as hot dogs and fatty cuts of red and white  meats, such as rib eye steak, sausage, ham, and joiner.  ¨ High-fat dairy items, such as whole milk, butter, and cream cheese.  · Eat small, frequent meals instead of large meals.  · Avoid drinking large amounts of liquid with your meals.  · Avoid eating meals during the 2-3 hours before bedtime.  · Avoid lying down right after you eat.  · Do not exercise right after you eat.  General instructions   · Pay attention to any changes in your symptoms.  · Take over-the-counter and prescription medicines only as told by your health care provider. Do not take aspirin, ibuprofen, or other NSAIDs unless your health care provider told you to do so.  · Do not use any tobacco products, including cigarettes, chewing tobacco, and e-cigarettes. If you need help quitting, ask your health care provider.  · Wear loose-fitting clothing. Do not wear anything tight around your waist that causes pressure on your abdomen.  · Raise (elevate) the head of your bed 6 inches (15cm).  · Try to reduce your stress, such as with yoga or meditation. If you need help reducing stress, ask your health care provider.  · If you are overweight, reduce your weight to an amount that is healthy for you. Ask your health care provider for guidance about a safe weight loss goal.  · Keep all follow-up visits as told by your health care provider. This is important.  Contact a health care provider if:  · You have new symptoms.  · You have unexplained weight loss.  · You have difficulty swallowing, or it hurts to swallow.  · You have wheezing or a persistent cough.  · Your symptoms do not improve with treatment.  · You have a hoarse voice.  Get help right away if:  · You have pain in your arms, neck, jaw, teeth, or back.  · You feel sweaty, dizzy, or light-headed.  · You have chest pain or shortness of breath.  · You vomit and your vomit looks like blood or coffee grounds.  · You faint.  · Your stool is bloody or black.  · You cannot swallow, drink, or eat.  This  information is not intended to replace advice given to you by your health care provider. Make sure you discuss any questions you have with your health care provider.  Document Released: 09/27/2006 Document Revised: 05/17/2017 Document Reviewed: 04/13/2016  [employer] Patient Education © 2017 [employer]    Calorie Counting for Weight Loss  Calories are units of energy. Your body needs a certain amount of calories from food to keep you going throughout the day. When you eat more calories than your body needs, your body stores the extra calories as fat. When you eat fewer calories than your body needs, your body burns fat to get the energy it needs.  Calorie counting means keeping track of how many calories you eat and drink each day. Calorie counting can be helpful if you need to lose weight. If you make sure to eat fewer calories than your body needs, you should lose weight. Ask your health care provider what a healthy weight is for you.  For calorie counting to work, you will need to eat the right number of calories in a day in order to lose a healthy amount of weight per week. A dietitian can help you determine how many calories you need in a day and will give you suggestions on how to reach your calorie goal.  · A healthy amount of weight to lose per week is usually 1-2 lb (0.5-0.9 kg). This usually means that your daily calorie intake should be reduced by 500-750 calories.  · Eating 1,200 - 1,500 calories per day can help most women lose weight.  · Eating 1,500 - 1,800 calories per day can help most men lose weight.  What is my plan?  My goal is to have __________ calories per day.  If I have this many calories per day, I should lose around __________ pounds per week.  What do I need to know about calorie counting?  In order to meet your daily calorie goal, you will need to:  · Find out how many calories are in each food you would like to eat. Try to do this before you eat.  · Decide how much of the  food you plan to eat.  · Write down what you ate and how many calories it had. Doing this is called keeping a food log.  To successfully lose weight, it is important to balance calorie counting with a healthy lifestyle that includes regular activity. Aim for 150 minutes of moderate exercise (such as walking) or 75 minutes of vigorous exercise (such as running) each week.  Where do I find calorie information?     The number of calories in a food can be found on a Nutrition Facts label. If a food does not have a Nutrition Facts label, try to look up the calories online or ask your dietitian for help.  Remember that calories are listed per serving. If you choose to have more than one serving of a food, you will have to multiply the calories per serving by the amount of servings you plan to eat. For example, the label on a package of bread might say that a serving size is 1 slice and that there are 90 calories in a serving. If you eat 1 slice, you will have eaten 90 calories. If you eat 2 slices, you will have eaten 180 calories.  How do I keep a food log?  Immediately after each meal, record the following information in your food log:  · What you ate. Don't forget to include toppings, sauces, and other extras on the food.  · How much you ate. This can be measured in cups, ounces, or number of items.  · How many calories each food and drink had.  · The total number of calories in the meal.  Keep your food log near you, such as in a small notebook in your pocket, or use a mobile verónica or website. Some programs will calculate calories for you and show you how many calories you have left for the day to meet your goal.  What are some calorie counting tips?  · Use your calories on foods and drinks that will fill you up and not leave you hungry:  ¨ Some examples of foods that fill you up are nuts and nut butters, vegetables, lean proteins, and high-fiber foods like whole grains. High-fiber foods are foods with more than 5 g  "fiber per serving.  ¨ Drinks such as sodas, specialty coffee drinks, alcohol, and juices have a lot of calories, yet do not fill you up.  · Eat nutritious foods and avoid empty calories. Empty calories are calories you get from foods or beverages that do not have many vitamins or protein, such as candy, sweets, and soda. It is better to have a nutritious high-calorie food (such as an avocado) than a food with few nutrients (such as a bag of chips).  · Know how many calories are in the foods you eat most often. This will help you calculate calorie counts faster.  · Pay attention to calories in drinks. Low-calorie drinks include water and unsweetened drinks.  · Pay attention to nutrition labels for \"low fat\" or \"fat free\" foods. These foods sometimes have the same amount of calories or more calories than the full fat versions. They also often have added sugar, starch, or salt, to make up for flavor that was removed with the fat.  · Find a way of tracking calories that works for you. Get creative. Try different apps or programs if writing down calories does not work for you.  What are some portion control tips?  · Know how many calories are in a serving. This will help you know how many servings of a certain food you can have.  · Use a measuring cup to measure serving sizes. You could also try weighing out portions on a kitchen scale. With time, you will be able to estimate serving sizes for some foods.  · Take some time to put servings of different foods on your favorite plates, bowls, and cups so you know what a serving looks like.  · Try not to eat straight from a bag or box. Doing this can lead to overeating. Put the amount you would like to eat in a cup or on a plate to make sure you are eating the right portion.  · Use smaller plates, glasses, and bowls to prevent overeating.  · Try not to multitask (for example, watch TV or use your computer) while eating. If it is time to eat, sit down at a table and enjoy your " food. This will help you to know when you are full. It will also help you to be aware of what you are eating and how much you are eating.  What are tips for following this plan?  Reading food labels   · Check the calorie count compared to the serving size. The serving size may be smaller than what you are used to eating.  · Check the source of the calories. Make sure the food you are eating is high in vitamins and protein and low in saturated and trans fats.  Shopping   · Read nutrition labels while you shop. This will help you make healthy decisions before you decide to purchase your food.  · Make a grocery list and stick to it.  Cooking   · Try to cook your favorite foods in a healthier way. For example, try baking instead of frying.  · Use low-fat dairy products.  Meal planning   · Use more fruits and vegetables. Half of your plate should be fruits and vegetables.  · Include lean proteins like poultry and fish.  How do I count calories when eating out?  · Ask for smaller portion sizes.  · Consider sharing an entree and sides instead of getting your own entree.  · If you get your own entree, eat only half. Ask for a box at the beginning of your meal and put the rest of your entree in it so you are not tempted to eat it.  · If calories are listed on the menu, choose the lower calorie options.  · Choose dishes that include vegetables, fruits, whole grains, low-fat dairy products, and lean protein.  · Choose items that are boiled, broiled, grilled, or steamed. Stay away from items that are buttered, battered, fried, or served with cream sauce. Items labeled “crispy” are usually fried, unless stated otherwise.  · Choose water, low-fat milk, unsweetened iced tea, or other drinks without added sugar. If you want an alcoholic beverage, choose a lower calorie option such as a glass of wine or light beer.  · Ask for dressings, sauces, and syrups on the side. These are usually high in calories, so you should limit the  amount you eat.  · If you want a salad, choose a garden salad and ask for grilled meats. Avoid extra toppings like joiner, cheese, or fried items. Ask for the dressing on the side, or ask for olive oil and vinegar or lemon to use as dressing.  · Estimate how many servings of a food you are given. For example, a serving of cooked rice is ½ cup or about the size of half a baseball. Knowing serving sizes will help you be aware of how much food you are eating at restaurants. The list below tells you how big or small some common portion sizes are based on everyday objects:  ¨ 1 oz--4 stacked dice.  ¨ 3 oz--1 deck of cards.  ¨ 1 tsp--1 die.  ¨ 1 Tbsp--½ a ping-pong ball.  ¨ 2 Tbsp--1 ping-pong ball.  ¨ ½ cup--½ baseball.  ¨ 1 cup--1 baseball.  Summary  · Calorie counting means keeping track of how many calories you eat and drink each day. If you eat fewer calories than your body needs, you should lose weight.  · A healthy amount of weight to lose per week is usually 1-2 lb (0.5-0.9 kg). This usually means reducing your daily calorie intake by 500-750 calories.  · The number of calories in a food can be found on a Nutrition Facts label. If a food does not have a Nutrition Facts label, try to look up the calories online or ask your dietitian for help.  · Use your calories on foods and drinks that will fill you up, and not on foods and drinks that will leave you hungry.  · Use smaller plates, glasses, and bowls to prevent overeating.  This information is not intended to replace advice given to you by your health care provider. Make sure you discuss any questions you have with your health care provider.  Document Released: 12/18/2006 Document Revised: 11/17/2017 Document Reviewed: 11/17/2017  [employer] Interactive Patient Education © 2017 [employer]      Call for problems or worsening symptoms

## 2018-04-24 NOTE — PROGRESS NOTES
OPERATIVE NOTE:    PROCEDURE:   Flexible Fiberoptic Laryngoscopy    ANESTHESIA:  None    REASON FOR PROCEDURE:  Procedure was recommend for suspicious clinical behavior unresponsive to medical management.  Risks, benefits and alternatives were discussed.      DETAILS of OPERATION:  The patient was seated in the exam chair.  A flexible fiberoptic laryngoscopy was performed through the oral cavity.  The scope was introduced into the oral cavity and directed to the level of the glottis, examining the structures of the oropharynx, base of tongue, vallecula, supraglottic larynx, glottic larynx, and hypopharynx.      FINDINGS:  Mucosal surfaces:   The mucosal surfaces demonstrated dryness thickened mucous    Base of tongue:  The base of tongue was found to have no mass or lesion.    Epiglottis:  The epiglottis was found to have no mass or lesion.    Aryepligottic fold:  The AE folds were found to have no mass or lesion.    False Vocal Fold:  The false cords were found to have no mass or lesion.    True Vocal Cord:  The true vocal cords were found to have no mass or lesion.    Arytenoid:   The arytenoids were found to have no mass or lesions.    Hypopharynx:  The hypopharynx was found to have no mass or lesion.    The patient tolerated procedure well.      No mass, lesions, erythema of note

## 2018-04-24 NOTE — PROGRESS NOTES
YOB: 1945  Location: [facility]  Location Address: [address] [place] [address], [facility], [address] [place] [address]  Location Phone: [phone]    Chief Complaint   Patient presents with   • Cough       History of Present Illness  Karan Piedra is a 73 y.o. male.  Karan Piedra is here for evaluation of ENT complaints. The patient has had problems with throat drainage, chronic throat clearing, cough  The symptoms are not localized to a particular location. The patient has had improving symptoms. The symptoms have been present for the last several months The symptoms are aggravated by  no identifiable factors. The symptoms are improved by antibiotics and allergy medications.  He has had a severe month history of a cough, drainage symptoms that has been treated by pulmonary.  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  He reports a gradual improvement.  He denies grossly obvious reflux and never has heartburn.  He denies sore throat.  He has suspected allergy symptoms and is being treated for sinusitis as a possible source however it has not been ruled out.  He denies sinus pain but has a lot of drainage that nasal sprays and antihistamines have not solved.  He denies fever, hemoptysis.      CT SINUSES completed and images reviewed with unremarkable paranasal sinuses.  Bilateral mickey bullosa noted.    CT Chest Without Contrast [[id number]] (Order [id number])   Order   Status: Final result   Study Notes        Dante Ortega on 3/14/2018  9:28 AM   Lung scarring        Appointment Information     Display Notes     ROB: ARRIVE TO BIC @ 9:00AM           PACS Images     Radiology Images   Study Result     CT CHEST WO CONTRAST- 3/14/2018 9:15 AM CDT     HISTORY: J98.4; J98.4-Other disorders of lung     COMPARISON: 2017     DOSE LENGTH PRODUCT: 506 mGy cm. Automated exposure control was also  utilized to decrease patient radiation dose.     TECHNIQUE:  Serial helical tomographic images of the chest were acquired.  Multiplanar reformatted images were provided for review.     FINDINGS:  The imaged portion of the neck and thyroid gland is unremarkable.      The lungs are clear without pneumothorax, consolidation, nodules or mass  lesion. Focal pleural thickening is noted bilaterally. This is nodular  thickening. When compared to the prior exam of August 30, 2017 this is  completely stable and unchanged.. The trachea and bronchial tree are  patent.     The heart, great vessels, and pulmonary vessels are normal in appearance  within limits of a noncontrast study. There is no pericardial effusion.  No enlarged axillary or mediastinal lymph nodes are present.      No acute findings are seen in the bones or surrounding soft tissues.     No acute findings are seen in the visualized portion of the upper  abdomen.     IMPRESSION:  1. Multiple nodular pleural thickening is again noted. This is  completely stable and unchanged from August 30, 2017. Most likely this  is a benign process.        This report was finalized on 03/14/2018 09:55 by Dr. Aston Hassan MD.          Past Medical History:   Diagnosis Date   • Colon polyps    • High cholesterol    • Hypertension        Past Surgical History:   Procedure Laterality Date   • COLONOSCOPY     • HERNIA REPAIR      abdominal   • PROSTATE ULTRASOUND BIOPSY  12/2016    negative   • ROTATOR CUFF REPAIR     • TONSILLECTOMY         Outpatient Prescriptions Marked as Taking for the 4/24/18 encounter (Office Visit) with LUZ MARIA Manriquez   Medication Sig Dispense Refill   • albuterol (PROVENTIL HFA;VENTOLIN HFA) 108 (90 Base) MCG/ACT inhaler Inhale.     • aspirin 81 MG EC tablet Take 81 mg by mouth Daily.     • azelastine (ASTELIN) 0.1 % nasal spray 2 sprays into each nostril 2 (Two) Times a Day. Use in each nostril as directed     • CIALIS 20 MG tablet      • coenzyme Q10 100 MG capsule Take 100 mg by mouth Daily.     • fexofenadine  (ALLEGRA) 180 MG tablet Take 180 mg by mouth.     • fluticasone (FLONASE) 50 MCG/ACT nasal spray into each nostril.     • Fluticasone Furoate-Vilanterol (BREO ELLIPTA) 100-25 MCG/INH aerosol powder  Inhale.     • hydrochlorothiazide (HYDRODIURIL) 25 MG tablet      • irbesartan (AVAPRO) 300 MG tablet      • montelukast (SINGULAIR) 10 MG tablet      • simvastatin (ZOCOR) 10 MG tablet          Review of patient's allergies indicates no known allergies.    Family History   Problem Relation Age of Onset   • No Known Problems Father    • No Known Problems Mother        Social History     Social History   • Marital status:      Spouse name: N/A   • Number of children: N/A   • Years of education: N/A     Occupational History   • Not on file.     Social History Main Topics   • Smoking status: Never Smoker   • Smokeless tobacco: Never Used   • Alcohol use Yes   • Drug use: Unknown   • Sexual activity: Not on file     Other Topics Concern   • Not on file     Social History Narrative   • No narrative on file       Review of Systems   Constitutional: Negative.    HENT: Negative for sinus pain, sore throat and trouble swallowing.         SEE HPI   Eyes: Negative.    Respiratory: Positive for cough.    Cardiovascular: Negative.    Gastrointestinal: Negative.    Endocrine: Negative.    Genitourinary: Negative.    Musculoskeletal: Negative.    Skin: Negative.    Allergic/Immunologic: Negative.    Neurological: Negative.    Hematological: Negative.    Psychiatric/Behavioral: Negative.        Vitals:    04/24/18 1023   BP: 130/83   Pulse: 75   Temp: 97.9 °F (36.6 °C)       Body mass index is 31.47 kg/m².    Objective     Physical Exam  CONSTITUTIONAL: well nourished, alert, oriented, in no acute distress     COMMUNICATION AND VOICE: able to communicate normally, normal voice quality    HEAD: normocephalic, no lesions, atraumatic, no tenderness, no masses     FACE: appearance normal, no lesions, no tenderness, no deformities,  facial motion symmetric    SALIVARY GLANDS: parotid glands with no tenderness, no swelling, no masses, submandibular glands with normal size, nontender    EYES: ocular motility normal, eyelids normal, orbits normal, no proptosis, conjunctiva normal , pupils equal, round     EARS:  Hearing: response to conversational voice normal bilaterally   External Ears: auricles without lesions  Otoscopic: tympanic membrane appearance normal, no lesions, no perforation, normal mobility, no fluid Cerumen in EACs bilaterally - removed with curette    NOSE:  External Nose: structure normal, no tenderness on palpation, no nasal discharge, no lesions, no evidence of trauma, nostrils patent   Intranasal Exam: nasal mucosa erythema, vestibule within normal limits, inferior turbinate normal, nasal septum midline     ORAL:  Lips: upper and lower lips without lesion   Teeth: dentition within normal limits for age   Gums: gingivae healthy   Oral Mucosa: oral mucosa normal, no mucosal lesions   Floor of Mouth: Warthin’s duct patent, mucosa normal  Tongue: lingual mucosa normal without lesions, normal tongue mobility   Palate: soft and hard palates with normal mucosa and structure  Oropharynx: oropharyngeal mucosa normal    HYPOPHARYNX:   LARYNX: see scope    NECK: neck appearance normal, no mass,  noted without erythema or tenderness    THYROID: no overt thyromegaly, no tenderness, nodules or mass present on palpation, position midline     LYMPH NODES: no lymphadenopathy    CHEST/RESPIRATORY: respiratory effort normal,     CARDIOVASCULAR: extremities without cyanosis or edema      NEUROLOGIC/PSYCHIATRIC: oriented to time, place and person, mood normal, affect appropriate, CN II-XII intact grossly    Assessment/Plan   Karan was seen today for cough.    Diagnoses and all orders for this visit:    Chronic sinusitis, unspecified location  -     CT Sinus Without Contrast    Allergic rhinitis, unspecified chronicity, unspecified seasonality,  unspecified trigger    Chronic cough    Throat clearing    Fatimah bullosa    Bilateral impacted cerumen      * Surgery not found *  Orders Placed This Encounter   Procedures   • CT Sinus Without Contrast     Scheduling Instructions:      Chronic rhinosinusitis, PND     Return if symptoms worsen or fail to improve.       Patient Instructions   Discussed reducing or weaning a few of the allergy meds - specifically the astelin if he plans to continue allegra.  Also may just need Allegra or Singulair.  Recommend to discuss this with ordering physician.    Be mindful of possible reflux - institute acid reflux precautions and if begins to have symptoms consider treatment.    Recommend to continue with current pulmonary therapy/ID tx    Call for problems or worsening symptoms

## 2018-04-30 LAB — FUNGUS WND CULT: NORMAL

## 2018-05-08 LAB
Lab: ABNORMAL
Lab: ABNORMAL
MYCOBACTERIUM SPEC CULT: ABNORMAL
NIGHT BLUE STAIN TISS: ABNORMAL

## 2018-05-15 LAB
Lab: ABNORMAL
Lab: ABNORMAL
MYCOBACTERIUM SPEC CULT: ABNORMAL
MYCOBACTERIUM SPEC CULT: ABNORMAL
NIGHT BLUE STAIN TISS: ABNORMAL
NIGHT BLUE STAIN TISS: ABNORMAL

## 2018-05-25 PROBLEM — Z86.19 HISTORY OF MYCOBACTERIUM AVIUM COMPLEX INFECTION: Status: ACTIVE | Noted: 2018-05-25

## 2018-06-12 ENCOUNTER — TRANSCRIBE ORDERS (OUTPATIENT)
Dept: ADMINISTRATIVE | Facility: HOSPITAL | Age: 73
End: 2018-06-12

## 2018-06-12 DIAGNOSIS — Z79.899 NEED FOR PROPHYLACTIC CHEMOTHERAPY: ICD-10-CM

## 2018-06-12 DIAGNOSIS — A31.2 DISSEMINATED MYCOBACTERIUM AVIUM-INTRACELLULARE COMPLEX (HCC): Primary | ICD-10-CM

## 2018-06-14 ENCOUNTER — HOSPITAL ENCOUNTER (OUTPATIENT)
Dept: CARDIOLOGY | Facility: HOSPITAL | Age: 73
Discharge: HOME | End: 2018-06-14
Attending: INTERNAL MEDICINE | Admitting: INTERNAL MEDICINE

## 2018-06-14 ENCOUNTER — TRANSCRIBE ORDERS (OUTPATIENT)
Dept: ADMINISTRATIVE | Facility: HOSPITAL | Age: 73
End: 2018-06-14

## 2018-06-14 DIAGNOSIS — Z79.899 NEED FOR PROPHYLACTIC CHEMOTHERAPY: ICD-10-CM

## 2018-06-14 DIAGNOSIS — A31.2 DISSEMINATED MYCOBACTERIUM AVIUM-INTRACELLULARE COMPLEX (HCC): Primary | ICD-10-CM

## 2018-06-14 DIAGNOSIS — A31.2 DISSEMINATED MYCOBACTERIUM AVIUM-INTRACELLULARE COMPLEX (HCC): ICD-10-CM

## 2018-06-14 PROCEDURE — 93010 ELECTROCARDIOGRAM REPORT: CPT | Performed by: INTERNAL MEDICINE

## 2018-06-14 PROCEDURE — 93005 ELECTROCARDIOGRAM TRACING: CPT | Performed by: INTERNAL MEDICINE

## 2018-06-19 ENCOUNTER — HOSPITAL ENCOUNTER (OUTPATIENT)
Dept: CARDIOLOGY | Facility: HOSPITAL | Age: 73
Discharge: HOME | End: 2018-06-19
Attending: INTERNAL MEDICINE | Admitting: INTERNAL MEDICINE

## 2018-06-19 DIAGNOSIS — A31.2 DISSEMINATED MYCOBACTERIUM AVIUM-INTRACELLULARE COMPLEX (HCC): ICD-10-CM

## 2018-06-19 DIAGNOSIS — Z79.899 NEED FOR PROPHYLACTIC CHEMOTHERAPY: ICD-10-CM

## 2018-06-19 PROCEDURE — 93005 ELECTROCARDIOGRAM TRACING: CPT | Performed by: INTERNAL MEDICINE

## 2018-06-19 PROCEDURE — 93010 ELECTROCARDIOGRAM REPORT: CPT | Performed by: INTERNAL MEDICINE

## 2018-06-22 ENCOUNTER — HOSPITAL ENCOUNTER (OUTPATIENT)
Dept: CARDIOLOGY | Facility: HOSPITAL | Age: 73
Discharge: HOME | End: 2018-06-22
Attending: INTERNAL MEDICINE | Admitting: INTERNAL MEDICINE

## 2018-06-22 ENCOUNTER — TRANSCRIBE ORDERS (OUTPATIENT)
Dept: ADMINISTRATIVE | Facility: HOSPITAL | Age: 73
End: 2018-06-22

## 2018-06-22 DIAGNOSIS — Z79.899 NEED FOR PROPHYLACTIC CHEMOTHERAPY: ICD-10-CM

## 2018-06-22 DIAGNOSIS — A31.2 DISSEMINATED MYCOBACTERIUM AVIUM-INTRACELLULARE COMPLEX (HCC): Primary | ICD-10-CM

## 2018-06-22 DIAGNOSIS — A31.2 DISSEMINATED MYCOBACTERIUM AVIUM-INTRACELLULARE COMPLEX (HCC): ICD-10-CM

## 2018-06-22 PROCEDURE — 93010 ELECTROCARDIOGRAM REPORT: CPT | Performed by: INTERNAL MEDICINE

## 2018-06-22 PROCEDURE — 93005 ELECTROCARDIOGRAM TRACING: CPT | Performed by: INTERNAL MEDICINE

## 2018-06-30 ENCOUNTER — APPOINTMENT (OUTPATIENT)
Dept: GENERAL RADIOLOGY | Facility: HOSPITAL | Age: 73
End: 2018-06-30

## 2018-06-30 ENCOUNTER — RESULTS ENCOUNTER (OUTPATIENT)
Dept: UROLOGY | Facility: CLINIC | Age: 73
End: 2018-06-30

## 2018-06-30 ENCOUNTER — HOSPITAL ENCOUNTER (EMERGENCY)
Facility: HOSPITAL | Age: 73
Discharge: HOME | End: 2018-06-30
Attending: EMERGENCY MEDICINE | Admitting: EMERGENCY MEDICINE

## 2018-06-30 VITALS
SYSTOLIC BLOOD PRESSURE: 121 MMHG | OXYGEN SATURATION: 96 % | RESPIRATION RATE: 14 BRPM | HEART RATE: 71 BPM | BODY MASS INDEX: 30.31 KG/M2 | HEIGHT: 68 IN | DIASTOLIC BLOOD PRESSURE: 74 MMHG | TEMPERATURE: 98 F | WEIGHT: 200 LBS

## 2018-06-30 DIAGNOSIS — S81.811A LACERATION OF RIGHT LOWER LEG, INITIAL ENCOUNTER: Primary | ICD-10-CM

## 2018-06-30 DIAGNOSIS — R97.20 ELEVATED PROSTATE SPECIFIC ANTIGEN (PSA): ICD-10-CM

## 2018-06-30 PROCEDURE — 96374 THER/PROPH/DIAG INJ IV PUSH: CPT

## 2018-06-30 PROCEDURE — 90715 TDAP VACCINE 7 YRS/> IM: CPT | Performed by: EMERGENCY MEDICINE

## 2018-06-30 PROCEDURE — 99284 EMERGENCY DEPT VISIT MOD MDM: CPT

## 2018-06-30 PROCEDURE — 73590 X-RAY EXAM OF LOWER LEG: CPT

## 2018-06-30 PROCEDURE — 25010000002 TDAP 5-2.5-18.5 LF-MCG/0.5 SUSPENSION: Performed by: EMERGENCY MEDICINE

## 2018-06-30 PROCEDURE — 90471 IMMUNIZATION ADMIN: CPT | Performed by: EMERGENCY MEDICINE

## 2018-06-30 PROCEDURE — 25010000003 CEFAZOLIN PER 500 MG: Performed by: EMERGENCY MEDICINE

## 2018-06-30 RX ORDER — AMOXICILLIN AND CLAVULANATE POTASSIUM 875; 125 MG/1; MG/1
1 TABLET, FILM COATED ORAL EVERY 12 HOURS SCHEDULED
Qty: 10 TABLET | Refills: 0 | Status: SHIPPED | OUTPATIENT
Start: 2018-06-30 | End: 2018-08-01

## 2018-06-30 RX ORDER — LIDOCAINE HYDROCHLORIDE AND EPINEPHRINE BITARTRATE 20; .01 MG/ML; MG/ML
10 INJECTION, SOLUTION SUBCUTANEOUS ONCE
Status: COMPLETED | OUTPATIENT
Start: 2018-06-30 | End: 2018-06-30

## 2018-06-30 RX ADMIN — LIDOCAINE HYDROCHLORIDE,EPINEPHRINE BITARTRATE 10 ML: 20; .01 INJECTION, SOLUTION INFILTRATION; PERINEURAL at 10:05

## 2018-06-30 RX ADMIN — TETANUS TOXOID, REDUCED DIPHTHERIA TOXOID AND ACELLULAR PERTUSSIS VACCINE, ADSORBED 0.5 ML: 5; 2.5; 8; 8; 2.5 SUSPENSION INTRAMUSCULAR at 09:55

## 2018-06-30 RX ADMIN — CEFAZOLIN 1 G: 1 INJECTION, POWDER, FOR SOLUTION INTRAMUSCULAR; INTRAVENOUS at 10:20

## 2018-07-03 NOTE — PROGRESS NOTES
Mr. Piedra is 73 y.o. male    CHIEF COMPLAINT: I am here for elevated PSA. My PSA is 8.610    HPI  He is here for follow-up elevated PSA.  His PSA has increased from 6.5-8.6.  His biopsy was negative and his PSA was 6.7.  He denies any significant voiding symptoms.    Lab Results   Component Value Date    PSA 8.610 (H) 07/05/2018    PSA 6.520 (H) 12/22/2017    PSA 5.850 (H) 06/07/2017    PSA    6.7      11/2016      The following portions of the patient's history were reviewed and updated as appropriate: allergies, current medications, past family history, past medical history, past social history, past surgical history and problem list.      Review of Systems   Constitutional: Negative for chills and fever.   Gastrointestinal: Negative for abdominal pain, anal bleeding and blood in stool.   Genitourinary: Negative for flank pain, frequency, hematuria and urgency.           Current Outpatient Prescriptions:   •  aspirin 81 MG EC tablet, Take 81 mg by mouth Daily., Disp: , Rfl:   •  azelastine (ASTELIN) 0.1 % nasal spray, 2 sprays into each nostril 2 (Two) Times a Day. Use in each nostril as directed, Disp: , Rfl:   •  CIALIS 20 MG tablet, , Disp: , Rfl:   •  coenzyme Q10 100 MG capsule, Take 100 mg by mouth Daily., Disp: , Rfl:   •  fexofenadine (ALLEGRA) 180 MG tablet, Take 180 mg by mouth., Disp: , Rfl:   •  fluticasone (FLONASE) 50 MCG/ACT nasal spray, into each nostril., Disp: , Rfl:   •  hydrochlorothiazide (HYDRODIURIL) 25 MG tablet, , Disp: , Rfl:   •  simvastatin (ZOCOR) 10 MG tablet, , Disp: , Rfl:   •  albuterol (PROVENTIL HFA;VENTOLIN HFA) 108 (90 Base) MCG/ACT inhaler, Inhale., Disp: , Rfl:   •  amoxicillin-clavulanate (AUGMENTIN) 875-125 MG per tablet, Take 1 tablet by mouth Every 12 (Twelve) Hours., Disp: 10 tablet, Rfl: 0  •  Fluticasone Furoate-Vilanterol (BREO ELLIPTA) 100-25 MCG/INH aerosol powder , Inhale., Disp: , Rfl:   •  irbesartan (AVAPRO) 300 MG tablet, , Disp: , Rfl:   •  montelukast  "(SINGULAIR) 10 MG tablet, , Disp: , Rfl:     Past Medical History:   Diagnosis Date   • Colon polyps    • High cholesterol    • Hypertension        Past Surgical History:   Procedure Laterality Date   • COLONOSCOPY     • HERNIA REPAIR      abdominal   • PROSTATE ULTRASOUND BIOPSY  12/2016    negative   • ROTATOR CUFF REPAIR     • TONSILLECTOMY         Social History     Social History   • Marital status:      Social History Main Topics   • Smoking status: Never Smoker   • Smokeless tobacco: Never Used   • Alcohol use Yes   • Drug use: Unknown     Other Topics Concern   • Not on file       Family History   Problem Relation Age of Onset   • No Known Problems Father    • No Known Problems Mother          /68   Temp 98.4 °F (36.9 °C)   Ht 174 cm (68.5\")   Wt 90.3 kg (199 lb)   BMI 29.82 kg/m²       Physical Exam  Alert and oriented ×3  Not agitated or distressed  No obvious deformities  No respiratory distress  Skin without pallor or diaphoresis  BILLY: Benign feeling prostate without nodule approximately 30 mL in size.   Penis and testicles are normal       Data  Results for orders placed or performed in visit on 07/12/18   POC Urinalysis Dipstick, Multipro   Result Value Ref Range    Color Yellow Yellow, Straw, Dark Yellow, Soni    Clarity, UA Clear Clear    Glucose, UA Negative Negative, 1000 mg/dL (3+) mg/dL    Bilirubin Negative Negative    Ketones, UA Negative Negative    Specific Gravity  1.020 1.005 - 1.030    Blood, UA Negative Negative    pH, Urine 7.0 5.0 - 8.0    Protein, POC Negative Negative mg/dL    Urobilinogen, UA Normal Normal    Nitrite, UA Negative Negative    Leukocytes Negative Negative     Assessment and Plan  Diagnoses and all orders for this visit:    Elevated prostate specific antigen (PSA)  Comments:  established, worsening  Orders:  -     POC Urinalysis Dipstick, Multipro  -     MRI Pelvis With & Without Contrast; Future    This patient has elevated psa with a history of " previous negative biopsy .  It is recommended that he have a multi parametric magnetic resonance imaging study of the pelvis with and without gadolinium contrast using specific prostate protocol.  It is explained that abnormalities can be seen on a 3 Noris magnet based upon water content and increased contrast enhancement suggesting neovascularity.  Such images can then be used with the [employer] system to allow fusion of the images with live transrectal ultrasound images to increase the accuracy of biopsy.  Another advantage of this system is that it does appear to be more likely to show high risk prostate cancer lesions which would be very important to identify since these are likely to be more aggressive.  The risks of prostate ultrasound and biopsy including infection with or without sepsis, hematuria, amount aspermia, and rectal bleeding are also discussed.  Transient erectile dysfunction is also included in the discussion.  The patient does wish to proceed.      Chemo Tierney MD  07/13/18  6:54 AM      Cc:

## 2018-07-05 ENCOUNTER — TRANSCRIBE ORDERS (OUTPATIENT)
Dept: LAB | Facility: HOSPITAL | Age: 73
End: 2018-07-05

## 2018-07-05 ENCOUNTER — APPOINTMENT (OUTPATIENT)
Dept: LAB | Facility: HOSPITAL | Age: 73
End: 2018-07-05
Attending: INTERNAL MEDICINE

## 2018-07-05 DIAGNOSIS — A31.0 PULMONARY DISEASE DUE TO MYCOBACTERIA (HCC): Primary | ICD-10-CM

## 2018-07-05 LAB
ALBUMIN SERPL-MCNC: 4.5 G/DL (ref 3.5–5)
ALBUMIN/GLOB SERPL: 1.6 G/DL (ref 1.1–2.5)
ALP SERPL-CCNC: 115 U/L (ref 24–120)
ALT SERPL W P-5'-P-CCNC: 46 U/L (ref 0–54)
ANION GAP SERPL CALCULATED.3IONS-SCNC: 17 MMOL/L (ref 4–13)
AST SERPL-CCNC: 33 U/L (ref 7–45)
BASOPHILS # BLD AUTO: 0.06 10*3/MM3 (ref 0–0.2)
BASOPHILS NFR BLD AUTO: 1 % (ref 0–2)
BILIRUB SERPL-MCNC: 1.3 MG/DL (ref 0.1–1)
BUN BLD-MCNC: 23 MG/DL (ref 5–21)
BUN/CREAT SERPL: 20.7 (ref 7–25)
CALCIUM SPEC-SCNC: 9.5 MG/DL (ref 8.4–10.4)
CHLORIDE SERPL-SCNC: 101 MMOL/L (ref 98–110)
CO2 SERPL-SCNC: 26 MMOL/L (ref 24–31)
CREAT BLD-MCNC: 1.11 MG/DL (ref 0.5–1.4)
DEPRECATED RDW RBC AUTO: 40.7 FL (ref 40–54)
EOSINOPHIL # BLD AUTO: 0.18 10*3/MM3 (ref 0–0.7)
EOSINOPHIL NFR BLD AUTO: 2.9 % (ref 0–4)
ERYTHROCYTE [DISTWIDTH] IN BLOOD BY AUTOMATED COUNT: 13.1 % (ref 12–15)
GFR SERPL CREATININE-BSD FRML MDRD: 65 ML/MIN/1.73
GLOBULIN UR ELPH-MCNC: 2.8 GM/DL
GLUCOSE BLD-MCNC: 113 MG/DL (ref 70–100)
HCT VFR BLD AUTO: 43.9 % (ref 40–52)
HGB BLD-MCNC: 14.6 G/DL (ref 14–18)
IMM GRANULOCYTES # BLD: 0.03 10*3/MM3 (ref 0–0.03)
IMM GRANULOCYTES NFR BLD: 0.5 % (ref 0–5)
LYMPHOCYTES # BLD AUTO: 0.82 10*3/MM3 (ref 0.72–4.86)
LYMPHOCYTES NFR BLD AUTO: 13.4 % (ref 15–45)
MCH RBC QN AUTO: 28.2 PG (ref 28–32)
MCHC RBC AUTO-ENTMCNC: 33.3 G/DL (ref 33–36)
MCV RBC AUTO: 84.7 FL (ref 82–95)
MONOCYTES # BLD AUTO: 0.64 10*3/MM3 (ref 0.19–1.3)
MONOCYTES NFR BLD AUTO: 10.5 % (ref 4–12)
NEUTROPHILS # BLD AUTO: 4.39 10*3/MM3 (ref 1.87–8.4)
NEUTROPHILS NFR BLD AUTO: 71.7 % (ref 39–78)
NRBC BLD MANUAL-RTO: 0 /100 WBC (ref 0–0)
PLATELET # BLD AUTO: 299 10*3/MM3 (ref 130–400)
PMV BLD AUTO: 9.1 FL (ref 6–12)
POTASSIUM BLD-SCNC: 4 MMOL/L (ref 3.5–5.3)
PROT SERPL-MCNC: 7.3 G/DL (ref 6.3–8.7)
PSA SERPL-MCNC: 8.61 NG/ML (ref 0–4)
RBC # BLD AUTO: 5.18 10*6/MM3 (ref 4.8–5.9)
SODIUM BLD-SCNC: 144 MMOL/L (ref 135–145)
WBC NRBC COR # BLD: 6.12 10*3/MM3 (ref 4.8–10.8)

## 2018-07-05 PROCEDURE — 36415 COLL VENOUS BLD VENIPUNCTURE: CPT

## 2018-07-05 PROCEDURE — 80053 COMPREHEN METABOLIC PANEL: CPT | Performed by: INTERNAL MEDICINE

## 2018-07-05 PROCEDURE — 85025 COMPLETE CBC W/AUTO DIFF WBC: CPT | Performed by: INTERNAL MEDICINE

## 2018-07-12 ENCOUNTER — OFFICE VISIT (OUTPATIENT)
Dept: UROLOGY | Facility: CLINIC | Age: 73
End: 2018-07-12

## 2018-07-12 VITALS
DIASTOLIC BLOOD PRESSURE: 68 MMHG | TEMPERATURE: 98.4 F | SYSTOLIC BLOOD PRESSURE: 128 MMHG | WEIGHT: 199 LBS | HEIGHT: 69 IN | BODY MASS INDEX: 29.47 KG/M2

## 2018-07-12 DIAGNOSIS — R97.20 ELEVATED PROSTATE SPECIFIC ANTIGEN (PSA): Primary | ICD-10-CM

## 2018-07-12 LAB
BILIRUB BLD-MCNC: NEGATIVE MG/DL
CLARITY, POC: CLEAR
COLOR UR: YELLOW
GLUCOSE UR STRIP-MCNC: NEGATIVE MG/DL
KETONES UR QL: NEGATIVE
LEUKOCYTE EST, POC: NEGATIVE
NITRITE UR-MCNC: NEGATIVE MG/ML
PH UR: 7 [PH] (ref 5–8)
PROT UR STRIP-MCNC: NEGATIVE MG/DL
RBC # UR STRIP: NEGATIVE /UL
SP GR UR: 1.02 (ref 1–1.03)
UROBILINOGEN UR QL: NORMAL

## 2018-07-12 PROCEDURE — 81002 URINALYSIS NONAUTO W/O SCOPE: CPT | Performed by: UROLOGY

## 2018-07-12 PROCEDURE — 99213 OFFICE O/P EST LOW 20 MIN: CPT | Performed by: UROLOGY

## 2018-07-12 NOTE — PATIENT INSTRUCTIONS

## 2018-07-20 ENCOUNTER — HOSPITAL ENCOUNTER (OUTPATIENT)
Dept: MRI IMAGING | Facility: HOSPITAL | Age: 73
Discharge: HOME | End: 2018-07-20
Attending: UROLOGY | Admitting: UROLOGY

## 2018-07-20 DIAGNOSIS — R97.20 ELEVATED PROSTATE SPECIFIC ANTIGEN (PSA): ICD-10-CM

## 2018-07-20 PROCEDURE — 0 GADOBENATE DIMEGLUMINE 529 MG/ML SOLUTION: Performed by: UROLOGY

## 2018-07-20 PROCEDURE — 72197 MRI PELVIS W/O & W/DYE: CPT

## 2018-07-20 PROCEDURE — A9577 INJ MULTIHANCE: HCPCS | Performed by: UROLOGY

## 2018-07-20 RX ADMIN — GADOBENATE DIMEGLUMINE 19 ML: 529 INJECTION, SOLUTION INTRAVENOUS at 11:25

## 2018-07-26 ENCOUNTER — PREP FOR SURGERY (OUTPATIENT)
Dept: OTHER | Facility: HOSPITAL | Age: 73
End: 2018-07-26

## 2018-07-26 DIAGNOSIS — R97.20 ELEVATED PROSTATE SPECIFIC ANTIGEN (PSA): Primary | ICD-10-CM

## 2018-07-26 RX ORDER — GENTAMICIN SULFATE 80 MG/100ML
80 INJECTION, SOLUTION INTRAVENOUS ONCE
Status: CANCELLED | OUTPATIENT
Start: 2018-07-26 | End: 2018-07-26

## 2018-07-27 PROBLEM — R97.20 ELEVATED PROSTATE SPECIFIC ANTIGEN (PSA): Status: ACTIVE | Noted: 2018-07-27

## 2018-08-01 ENCOUNTER — APPOINTMENT (OUTPATIENT)
Dept: PREADMISSION TESTING | Facility: HOSPITAL | Age: 73
End: 2018-08-01

## 2018-08-01 VITALS
HEIGHT: 68 IN | WEIGHT: 199.52 LBS | SYSTOLIC BLOOD PRESSURE: 138 MMHG | OXYGEN SATURATION: 97 % | BODY MASS INDEX: 30.24 KG/M2 | DIASTOLIC BLOOD PRESSURE: 67 MMHG | RESPIRATION RATE: 15 BRPM | HEART RATE: 73 BPM

## 2018-08-01 LAB
ANION GAP SERPL CALCULATED.3IONS-SCNC: 12 MMOL/L (ref 4–13)
BACTERIA UR QL AUTO: ABNORMAL /HPF
BILIRUB UR QL STRIP: NEGATIVE
BUN BLD-MCNC: 16 MG/DL (ref 5–21)
BUN/CREAT SERPL: 22.5 (ref 7–25)
CALCIUM SPEC-SCNC: 9.3 MG/DL (ref 8.4–10.4)
CHLORIDE SERPL-SCNC: 103 MMOL/L (ref 98–110)
CLARITY UR: CLEAR
CO2 SERPL-SCNC: 27 MMOL/L (ref 24–31)
COLOR UR: ABNORMAL
CREAT BLD-MCNC: 0.71 MG/DL (ref 0.5–1.4)
DEPRECATED RDW RBC AUTO: 39.8 FL (ref 40–54)
ERYTHROCYTE [DISTWIDTH] IN BLOOD BY AUTOMATED COUNT: 13.2 % (ref 12–15)
GFR SERPL CREATININE-BSD FRML MDRD: 109 ML/MIN/1.73
GLUCOSE BLD-MCNC: 93 MG/DL (ref 70–100)
GLUCOSE UR STRIP-MCNC: NEGATIVE MG/DL
HCT VFR BLD AUTO: 40.9 % (ref 40–52)
HGB BLD-MCNC: 14.2 G/DL (ref 14–18)
HGB UR QL STRIP.AUTO: NEGATIVE
HYALINE CASTS UR QL AUTO: ABNORMAL /LPF
KETONES UR QL STRIP: NEGATIVE
LEUKOCYTE ESTERASE UR QL STRIP.AUTO: ABNORMAL
MCH RBC QN AUTO: 28.9 PG (ref 28–32)
MCHC RBC AUTO-ENTMCNC: 34.7 G/DL (ref 33–36)
MCV RBC AUTO: 83.1 FL (ref 82–95)
NITRITE UR QL STRIP: POSITIVE
PH UR STRIP.AUTO: 5.5 [PH] (ref 5–8)
PLATELET # BLD AUTO: 179 10*3/MM3 (ref 130–400)
PMV BLD AUTO: 9.3 FL (ref 6–12)
POTASSIUM BLD-SCNC: 4 MMOL/L (ref 3.5–5.3)
PROT UR QL STRIP: NEGATIVE
RBC # BLD AUTO: 4.92 10*6/MM3 (ref 4.8–5.9)
RBC # UR: ABNORMAL /HPF
REF LAB TEST METHOD: ABNORMAL
SODIUM BLD-SCNC: 142 MMOL/L (ref 135–145)
SP GR UR STRIP: 1.01 (ref 1–1.03)
SQUAMOUS #/AREA URNS HPF: ABNORMAL /HPF
UROBILINOGEN UR QL STRIP: ABNORMAL
WBC NRBC COR # BLD: 5.58 10*3/MM3 (ref 4.8–10.8)
WBC UR QL AUTO: ABNORMAL /HPF

## 2018-08-01 PROCEDURE — 81001 URINALYSIS AUTO W/SCOPE: CPT | Performed by: UROLOGY

## 2018-08-01 PROCEDURE — 80048 BASIC METABOLIC PNL TOTAL CA: CPT | Performed by: UROLOGY

## 2018-08-01 PROCEDURE — 85027 COMPLETE CBC AUTOMATED: CPT | Performed by: UROLOGY

## 2018-08-01 PROCEDURE — 36415 COLL VENOUS BLD VENIPUNCTURE: CPT

## 2018-08-01 PROCEDURE — 87086 URINE CULTURE/COLONY COUNT: CPT | Performed by: UROLOGY

## 2018-08-01 RX ORDER — ETHAMBUTOL HYDROCHLORIDE 400 MG/1
2000 TABLET, FILM COATED ORAL 3 TIMES WEEKLY
Status: ON HOLD | COMMUNITY
End: 2021-10-19

## 2018-08-01 RX ORDER — ROSUVASTATIN CALCIUM 10 MG/1
10 TABLET, COATED ORAL DAILY
Status: ON HOLD | COMMUNITY
End: 2021-10-19

## 2018-08-01 RX ORDER — RIFAMPIN 300 MG/1
600 CAPSULE ORAL 3 TIMES WEEKLY
Status: ON HOLD | COMMUNITY
End: 2021-10-19

## 2018-08-01 RX ORDER — AZITHROMYCIN 500 MG/1
500 TABLET, FILM COATED ORAL 3 TIMES WEEKLY
Status: ON HOLD | COMMUNITY
End: 2021-10-19

## 2018-08-01 NOTE — DISCHARGE INSTRUCTIONS
DAY OF SURGERY INSTRUCTIONS      Procedure Description: PROSTATE ULTRASOUND  URONAV FUSION BIOPSY    Order Entered By: RAE HEATH    DATE OF SURGERY: AUGUST 7 2018    ARRIVAL TIME: AS DIRECTED BY OFFICE    DAY OF SURGERY TAKE ONLY THESE MEDICATIONS UNLESS OTHERWISE INSTRUCTED BY YOUR PHYSICIAN: NONE          MANAGING PAIN AFTER SURGERY    We know you are probably wondering what your pain will be like after surgery.  Following surgery it is unrealistic to expect you will not have pain.   Pain is how our bodies let us know that something is wrong or cautions us to be careful.  That said, our goal is to make your pain tolerable.    Methods we may use to treat your pain include (oral or IV medications, PCAs, epidurals, nerve blocks, etc.)   While some procedures require IV pain medications for a short time after surgery, transitioning to pain medications by mouth allows for better management of pain.   Your nurse will encourage you to take oral pain medications whenever possible.  IV medications work almost immediately, but only last a short while.  Taking medications by mouth allows for a more constant level of medication in your blood stream for a longer period of time.      Once your pain is out of control it is harder to get back under control.  It is important you are aware when your next dose of pain medication is due.  If you are admitted, your nurse may write the time of your next dose on the white board in your room to help you remember.      We are interested in your pain and encourage you to inform us about aggravating factors during your visit.   Many times a simple repositioning every few hours can make a big difference.    If your physician says it is okay, do not let your pain prevent you from getting out of bed. Be sure to call your nurse for assistance prior to getting up so you do not fall.      Before surgery, please decide your tolerable pain goal.  These faces help describe the pain ratings we  use on a 0-10 scale.   Be prepared to tell us your goal and whether or not you take pain or anxiety medications at home.            BEFORE YOU COME TO THE HOSPITAL  (Pre-op instructions)  • Do not eat, drink, smoke or chew gum after midnight the night before surgery.  This also includes no mints.  • Morning of surgery take only the medicines you have been instructed with a sip of water unless otherwise instructed  by your physician.  • Do not shave, wear makeup or dark nail polish.  • Remove all jewelry including rings.  • Leave anything you consider valuable at home.  • Leave your suitcase in the car until after your surgery.  • Bring the following with you if applicable:  o Picture ID and insurance, Medicare or Medicaid cards  o Co-pay/deductible required by insurance (cash, check, credit card)  o Copy of advance directive, living will or power-of- documents if not brought to PAT  o CPAP or BIPAP mask and tubing  o Relaxation aids (MP3 player, book, magazine)  • On the day of surgery check in at registration located at the main entrance of the hospital.       Outpatient Surgery Guidelines, Adult  Outpatient procedures are those for which the person having the procedure is allowed to go home the same day as the procedure. Various procedures are done on an outpatient basis. You should follow some general guidelines if you will be having an outpatient procedure.  LET YOUR HEALTH CARE PROVIDER KNOW ABOUT:  · Any allergies you have.  · All medicines you are taking, including vitamins, herbs, eye drops, creams, and over-the-counter medicines.  · Previous problems you or members of your family have had with the use of anesthetics.  · Any blood disorders you have.  · Previous surgeries you have had.  · Medical conditions you have.  RISKS AND COMPLICATIONS  Your health care provider will discuss possible risks and complications with you before surgery. Common risks and complications include:    · Problems due to  the use of anesthetics.  · Blood loss and replacement (does not apply to minor surgical procedures).  · Temporary increase in pain due to surgery.  · Uncorrected pain or problems that the surgery was meant to correct.  · Infection.  · New damage.  BEFORE THE PROCEDURE  · Ask your health care provider about changing or stopping your regular medicines. You may need to stop taking certain medicines in the days or weeks before the procedure.  · Stop smoking at least 2 weeks before surgery. This lowers your risk for complications during and after surgery. Ask your health care provider for help with this if needed.  · Eat your usual meals and a light supper the day before surgery. Continue fluid intake. Do not drink alcohol.  · Do not eat or drink after midnight the night before your surgery.   · Arrange for someone to take you home and to stay with you for 24 hours after the procedure. Medicine given for your procedure may affect your ability to drive or to care for yourself.  · Call your health care provider's office if you develop an illness or problem that may prevent you from safely having your procedure.  AFTER THE PROCEDURE  After surgery, you will be taken to a recovery area, where your progress will be monitored. If there are no complications, you will be allowed to go home when you are awake, stable, and taking fluids well. You may have numbness around the surgical site. Healing will take some time. You will have tenderness at the surgical site and may have some swelling and bruising. You may also have some nausea.  HOME CARE INSTRUCTIONS  · Do not drive for 24 hours, or as directed by your health care provider. Do not drive while taking prescription pain medicines.  · Do not drink alcohol for 24 hours.  · Do not make important decisions or sign legal documents for 24 hours.  · You may resume a normal diet and activities as directed.  · Do not lift anything heavier than 10 pounds (4.5 kg) or play contact sports  until your health care provider says it is okay.  · Change your bandages (dressings) as directed.  · Only take over-the-counter or prescription medicines as directed by your health care provider.  · Follow up with your health care provider as directed.  SEEK MEDICAL CARE IF:  · You have increased bleeding (more than a small spot) from the surgical site.  · You have redness, swelling, or increasing pain in the wound.  · You see pus coming from the wound.  · You have a fever.  · You notice a bad smell coming from the wound or dressing.  · You feel lightheaded or faint.  · You develop a rash.  · You have trouble breathing.  · You develop allergies.  MAKE SURE YOU:  · Understand these instructions.  · Will watch your condition.  · Will get help right away if you are not doing well or get worse.     This information is not intended to replace advice given to you by your health care provider. Make sure you discuss any questions you have with your health care provider.     Document Released: 09/12/2002 Document Revised: 05/03/2016 Document Reviewed: 05/22/2014  [employer] Interactive Patient Education ©2016 [employer]       Fall Prevention in Hospitals, Adult  As a hospital patient, your condition and the treatments you receive can increase your risk for falls. Some additional risk factors for falls in a hospital include:  · Being in an unfamiliar environment.  · Being on bed rest.  · Your surgery.  · Taking certain medicines.  · Your tubing requirements, such as intravenous (IV) therapy or catheters.  It is important that you learn how to decrease fall risks while at the hospital. Below are important tips that can help prevent falls.  SAFETY TIPS FOR PREVENTING FALLS  Talk about your risk of falling.  · Ask your health care provider why you are at risk for falling. Is it your medicine, illness, tubing placement, or something else?  · Make a plan with your health care provider to keep you safe from falls.  · Ask your  health care provider or pharmacist about side effects of your medicines. Some medicines can make you dizzy or affect your coordination.  Ask for help.  · Ask for help before getting out of bed. You may need to press your call button.  · Ask for assistance in getting safely to the toilet.  · Ask for a walker or cane to be put at your bedside. Ask that most of the side rails on your bed be placed up before your health care provider leaves the room.  · Ask family or friends to sit with you.  · Ask for things that are out of your reach, such as your glasses, hearing aids, telephone, bedside table, or call button.  Follow these tips to avoid falling:  · Stay lying or seated, rather than standing, while waiting for help.  · Wear rubber-soled slippers or shoes whenever you walk in the hospital.  · Avoid quick, sudden movements.  ¨ Change positions slowly.  ¨ Sit on the side of your bed before standing.  ¨ Stand up slowly and wait before you start to walk.  · Let your health care provider know if there is a spill on the floor.  · Pay careful attention to the medical equipment, electrical cords, and tubes around you.  · When you need help, use your call button by your bed or in the bathroom. Wait for one of your health care providers to help you.  · If you feel dizzy or unsure of your footing, return to bed and wait for assistance.  · Avoid being distracted by the TV, telephone, or another person in your room.  · Do not lean or support yourself on rolling objects, such as IV poles or bedside tables.     This information is not intended to replace advice given to you by your health care provider. Make sure you discuss any questions you have with your health care provider.     Document Released: 12/15/2001 Document Revised: 01/08/2016 Document Reviewed: 08/25/2013  [employer] Interactive Patient Education ©2016 [employer]       Surgical Site Infections FAQs  What is a Surgical Site Infection (SSI)?  A surgical site infection  is an infection that occurs after surgery in the part of the body where the surgery took place. Most patients who have surgery do not develop an infection. However, infections develop in about 1 to 3 out of every 100 patients who have surgery.  Some of the common symptoms of a surgical site infection are:  · Redness and pain around the area where you had surgery  · Drainage of cloudy fluid from your surgical wound  · Fever  Can SSIs be treated?  Yes. Most surgical site infections can be treated with antibiotics. The antibiotic given to you depends on the bacteria (germs) causing the infection. Sometimes patients with SSIs also need another surgery to treat the infection.  What are some of the things that hospitals are doing to prevent SSIs?  To prevent SSIs, doctors, nurses, and other healthcare providers:  · Clean their hands and arms up to their elbows with an antiseptic agent just before the surgery.  · Clean their hands with soap and water or an alcohol-based hand rub before and after caring for each patient.  · May remove some of your hair immediately before your surgery using electric clippers if the hair is in the same area where the procedure will occur. They should not shave you with a razor.  · Wear special hair covers, masks, gowns, and gloves during surgery to keep the surgery area clean.  · Give you antibiotics before your surgery starts. In most cases, you should get antibiotics within 60 minutes before the surgery starts and the antibiotics should be stopped within 24 hours after surgery.  · Clean the skin at the site of your surgery with a special soap that kills germs.  What can I do to help prevent SSIs?  Before your surgery:  · Tell your doctor about other medical problems you may have. Health problems such as allergies, diabetes, and obesity could affect your surgery and your treatment.  · Quit smoking. Patients who smoke get more infections. Talk to your doctor about how you can quit before your  surgery.  · Do not shave near where you will have surgery. Shaving with a razor can irritate your skin and make it easier to develop an infection.  At the time of your surgery:  · Speak up if someone tries to shave you with a razor before surgery. Ask why you need to be shaved and talk with your surgeon if you have any concerns.  · Ask if you will get antibiotics before surgery.  After your surgery:  · Make sure that your healthcare providers clean their hands before examining you, either with soap and water or an alcohol-based hand rub.  · If you do not see your providers clean their hands, please ask them to do so.  · Family and friends who visit you should not touch the surgical wound or dressings.  · Family and friends should clean their hands with soap and water or an alcohol-based hand rub before and after visiting you. If you do not see them clean their hands, ask them to clean their hands.  What do I need to do when I go home from the hospital?  · Before you go home, your doctor or nurse should explain everything you need to know about taking care of your wound. Make sure you understand how to care for your wound before you leave the hospital.  · Always clean your hands before and after caring for your wound.  · Before you go home, make sure you know who to contact if you have questions or problems after you get home.  · If you have any symptoms of an infection, such as redness and pain at the surgery site, drainage, or fever, call your doctor immediately.  If you have additional questions, please ask your doctor or nurse.  Developed and co-sponsored by The Society for Healthcare Epidemiology of [place] (SHEA); Infectious Diseases Society of [place] (IDSA); American Hospital Association; Association for Professionals in Infection Control and Epidemiology (APIC); Centers for Disease Control and Prevention (CDC); and The Joint Commission.     This information is not intended to replace advice given to you by  your health care provider. Make sure you discuss any questions you have with your health care provider.     Document Released: 12/23/2014 Document Revised: 01/08/2016 Document Reviewed: 03/02/2016  [employer] Interactive Patient Education ©2016 [employer]     PATIENT/FAMILY/RESPONSIBLE PARTY VERBALIZES UNDERSTANDING OF ABOVE EDUCATION.  COPY OF PAIN SCALE GIVEN AND REVIEWED WITH VERBALIZED UNDERSTANDING.

## 2018-08-03 ENCOUNTER — HOSPITAL ENCOUNTER (OUTPATIENT)
Dept: RADIATION ONCOLOGY | Facility: HOSPITAL | Age: 73
Setting detail: RADIATION/ONCOLOGY SERIES
End: 2018-08-03

## 2018-08-03 LAB — BACTERIA SPEC AEROBE CULT: NORMAL

## 2018-08-07 ENCOUNTER — ANESTHESIA (OUTPATIENT)
Dept: PERIOP | Facility: HOSPITAL | Age: 73
End: 2018-08-07

## 2018-08-07 ENCOUNTER — HOSPITAL ENCOUNTER (OUTPATIENT)
Facility: HOSPITAL | Age: 73
Setting detail: HOSPITAL OUTPATIENT SURGERY
Discharge: HOME | End: 2018-08-07
Attending: UROLOGY | Admitting: UROLOGY

## 2018-08-07 ENCOUNTER — ANESTHESIA EVENT (OUTPATIENT)
Dept: PERIOP | Facility: HOSPITAL | Age: 73
End: 2018-08-07

## 2018-08-07 VITALS
RESPIRATION RATE: 16 BRPM | SYSTOLIC BLOOD PRESSURE: 120 MMHG | TEMPERATURE: 98.4 F | DIASTOLIC BLOOD PRESSURE: 56 MMHG | HEART RATE: 73 BPM | OXYGEN SATURATION: 95 %

## 2018-08-07 DIAGNOSIS — R97.20 ELEVATED PROSTATE SPECIFIC ANTIGEN (PSA): ICD-10-CM

## 2018-08-07 PROCEDURE — 25010000002 DEXAMETHASONE PER 1 MG: Performed by: ANESTHESIOLOGY

## 2018-08-07 PROCEDURE — 88342 IMHCHEM/IMCYTCHM 1ST ANTB: CPT | Performed by: UROLOGY

## 2018-08-07 PROCEDURE — G0416 PROSTATE BIOPSY, ANY MTHD: HCPCS | Performed by: UROLOGY

## 2018-08-07 PROCEDURE — 55700 PR PROSTATE NEEDLE BIOPSY ANY APPROACH: CPT | Performed by: UROLOGY

## 2018-08-07 PROCEDURE — 76872 US TRANSRECTAL: CPT | Performed by: UROLOGY

## 2018-08-07 PROCEDURE — 25010000003 CEFAZOLIN PER 500 MG: Performed by: UROLOGY

## 2018-08-07 PROCEDURE — 25010000002 GENTAMICIN PER 80 MG: Performed by: UROLOGY

## 2018-08-07 PROCEDURE — 76942 ECHO GUIDE FOR BIOPSY: CPT | Performed by: UROLOGY

## 2018-08-07 PROCEDURE — 25010000002 FENTANYL CITRATE (PF) 100 MCG/2ML SOLUTION: Performed by: NURSE ANESTHETIST, CERTIFIED REGISTERED

## 2018-08-07 PROCEDURE — 25010000002 PROPOFOL 10 MG/ML EMULSION: Performed by: NURSE ANESTHETIST, CERTIFIED REGISTERED

## 2018-08-07 PROCEDURE — 25010000002 ONDANSETRON PER 1 MG: Performed by: NURSE ANESTHETIST, CERTIFIED REGISTERED

## 2018-08-07 RX ORDER — FENTANYL CITRATE 50 UG/ML
25 INJECTION, SOLUTION INTRAMUSCULAR; INTRAVENOUS AS NEEDED
Status: DISCONTINUED | OUTPATIENT
Start: 2018-08-07 | End: 2018-08-07 | Stop reason: HOSPADM

## 2018-08-07 RX ORDER — DEXAMETHASONE SODIUM PHOSPHATE 4 MG/ML
4 INJECTION, SOLUTION INTRA-ARTICULAR; INTRALESIONAL; INTRAMUSCULAR; INTRAVENOUS; SOFT TISSUE ONCE AS NEEDED
Status: COMPLETED | OUTPATIENT
Start: 2018-08-07 | End: 2018-08-07

## 2018-08-07 RX ORDER — OXYCODONE AND ACETAMINOPHEN 7.5; 325 MG/1; MG/1
2 TABLET ORAL ONCE AS NEEDED
Status: DISCONTINUED | OUTPATIENT
Start: 2018-08-07 | End: 2018-08-07 | Stop reason: HOSPADM

## 2018-08-07 RX ORDER — CEPHALEXIN 500 MG/1
500 CAPSULE ORAL 3 TIMES DAILY
Qty: 6 CAPSULE | Refills: 0 | Status: SHIPPED | OUTPATIENT
Start: 2018-08-07 | End: 2018-08-09

## 2018-08-07 RX ORDER — NALOXONE HCL 0.4 MG/ML
0.4 VIAL (ML) INJECTION AS NEEDED
Status: DISCONTINUED | OUTPATIENT
Start: 2018-08-07 | End: 2018-08-07 | Stop reason: HOSPADM

## 2018-08-07 RX ORDER — ROCURONIUM BROMIDE 10 MG/ML
INJECTION, SOLUTION INTRAVENOUS AS NEEDED
Status: DISCONTINUED | OUTPATIENT
Start: 2018-08-07 | End: 2018-08-07 | Stop reason: SURG

## 2018-08-07 RX ORDER — IPRATROPIUM BROMIDE AND ALBUTEROL SULFATE 2.5; .5 MG/3ML; MG/3ML
3 SOLUTION RESPIRATORY (INHALATION) ONCE
Status: COMPLETED | OUTPATIENT
Start: 2018-08-07 | End: 2018-08-07

## 2018-08-07 RX ORDER — LABETALOL HYDROCHLORIDE 5 MG/ML
5 INJECTION, SOLUTION INTRAVENOUS
Status: DISCONTINUED | OUTPATIENT
Start: 2018-08-07 | End: 2018-08-07 | Stop reason: HOSPADM

## 2018-08-07 RX ORDER — MAGNESIUM HYDROXIDE 1200 MG/15ML
LIQUID ORAL AS NEEDED
Status: DISCONTINUED | OUTPATIENT
Start: 2018-08-07 | End: 2018-08-07 | Stop reason: HOSPADM

## 2018-08-07 RX ORDER — HYDROCODONE BITARTRATE AND ACETAMINOPHEN 7.5; 325 MG/1; MG/1
1 TABLET ORAL ONCE AS NEEDED
Status: DISCONTINUED | OUTPATIENT
Start: 2018-08-07 | End: 2018-08-07 | Stop reason: HOSPADM

## 2018-08-07 RX ORDER — SODIUM CHLORIDE, SODIUM LACTATE, POTASSIUM CHLORIDE, CALCIUM CHLORIDE 600; 310; 30; 20 MG/100ML; MG/100ML; MG/100ML; MG/100ML
1000 INJECTION, SOLUTION INTRAVENOUS CONTINUOUS
Status: DISCONTINUED | OUTPATIENT
Start: 2018-08-07 | End: 2018-08-07 | Stop reason: HOSPADM

## 2018-08-07 RX ORDER — HYDROCODONE BITARTRATE AND ACETAMINOPHEN 5; 325 MG/1; MG/1
1 TABLET ORAL EVERY 8 HOURS PRN
Qty: 6 TABLET | Refills: 0 | Status: SHIPPED | OUTPATIENT
Start: 2018-08-07 | End: 2018-08-09

## 2018-08-07 RX ORDER — IPRATROPIUM BROMIDE AND ALBUTEROL SULFATE 2.5; .5 MG/3ML; MG/3ML
3 SOLUTION RESPIRATORY (INHALATION) ONCE AS NEEDED
Status: DISCONTINUED | OUTPATIENT
Start: 2018-08-07 | End: 2018-08-07 | Stop reason: HOSPADM

## 2018-08-07 RX ORDER — SODIUM CHLORIDE 0.9 % (FLUSH) 0.9 %
1-10 SYRINGE (ML) INJECTION AS NEEDED
Status: DISCONTINUED | OUTPATIENT
Start: 2018-08-07 | End: 2018-08-07 | Stop reason: HOSPADM

## 2018-08-07 RX ORDER — METOCLOPRAMIDE HYDROCHLORIDE 5 MG/ML
5 INJECTION INTRAMUSCULAR; INTRAVENOUS
Status: DISCONTINUED | OUTPATIENT
Start: 2018-08-07 | End: 2018-08-07 | Stop reason: HOSPADM

## 2018-08-07 RX ORDER — SODIUM CHLORIDE, SODIUM LACTATE, POTASSIUM CHLORIDE, CALCIUM CHLORIDE 600; 310; 30; 20 MG/100ML; MG/100ML; MG/100ML; MG/100ML
100 INJECTION, SOLUTION INTRAVENOUS CONTINUOUS
Status: DISCONTINUED | OUTPATIENT
Start: 2018-08-07 | End: 2018-08-07 | Stop reason: HOSPADM

## 2018-08-07 RX ORDER — OXYCODONE AND ACETAMINOPHEN 10; 325 MG/1; MG/1
1 TABLET ORAL ONCE AS NEEDED
Status: DISCONTINUED | OUTPATIENT
Start: 2018-08-07 | End: 2018-08-07 | Stop reason: HOSPADM

## 2018-08-07 RX ORDER — ONDANSETRON 2 MG/ML
4 INJECTION INTRAMUSCULAR; INTRAVENOUS ONCE AS NEEDED
Status: DISCONTINUED | OUTPATIENT
Start: 2018-08-07 | End: 2018-08-07 | Stop reason: HOSPADM

## 2018-08-07 RX ORDER — MEPERIDINE HYDROCHLORIDE 50 MG/ML
12.5 INJECTION INTRAMUSCULAR; INTRAVENOUS; SUBCUTANEOUS
Status: DISCONTINUED | OUTPATIENT
Start: 2018-08-07 | End: 2018-08-07 | Stop reason: HOSPADM

## 2018-08-07 RX ORDER — IBUPROFEN 600 MG/1
600 TABLET ORAL ONCE AS NEEDED
Status: DISCONTINUED | OUTPATIENT
Start: 2018-08-07 | End: 2018-08-07 | Stop reason: HOSPADM

## 2018-08-07 RX ORDER — FENTANYL CITRATE 50 UG/ML
INJECTION, SOLUTION INTRAMUSCULAR; INTRAVENOUS AS NEEDED
Status: DISCONTINUED | OUTPATIENT
Start: 2018-08-07 | End: 2018-08-07 | Stop reason: SURG

## 2018-08-07 RX ORDER — PROPOFOL 10 MG/ML
VIAL (ML) INTRAVENOUS AS NEEDED
Status: DISCONTINUED | OUTPATIENT
Start: 2018-08-07 | End: 2018-08-07 | Stop reason: SURG

## 2018-08-07 RX ORDER — SODIUM CHLORIDE 0.9 % (FLUSH) 0.9 %
3 SYRINGE (ML) INJECTION AS NEEDED
Status: DISCONTINUED | OUTPATIENT
Start: 2018-08-07 | End: 2018-08-07 | Stop reason: HOSPADM

## 2018-08-07 RX ORDER — ACETAMINOPHEN 500 MG
1000 TABLET ORAL ONCE
Status: COMPLETED | OUTPATIENT
Start: 2018-08-07 | End: 2018-08-07

## 2018-08-07 RX ORDER — ONDANSETRON 2 MG/ML
INJECTION INTRAMUSCULAR; INTRAVENOUS AS NEEDED
Status: DISCONTINUED | OUTPATIENT
Start: 2018-08-07 | End: 2018-08-07 | Stop reason: SURG

## 2018-08-07 RX ORDER — GENTAMICIN SULFATE 80 MG/100ML
80 INJECTION, SOLUTION INTRAVENOUS ONCE
Status: COMPLETED | OUTPATIENT
Start: 2018-08-07 | End: 2018-08-07

## 2018-08-07 RX ADMIN — ONDANSETRON HYDROCHLORIDE 4 MG: 2 SOLUTION INTRAMUSCULAR; INTRAVENOUS at 09:15

## 2018-08-07 RX ADMIN — PROPOFOL 200 MG: 10 INJECTION, EMULSION INTRAVENOUS at 08:44

## 2018-08-07 RX ADMIN — FENTANYL CITRATE 100 MCG: 50 INJECTION, SOLUTION INTRAMUSCULAR; INTRAVENOUS at 08:43

## 2018-08-07 RX ADMIN — ACETAMINOPHEN 1000 MG: 500 TABLET, FILM COATED ORAL at 07:57

## 2018-08-07 RX ADMIN — CEFAZOLIN 2 G: 1 INJECTION, POWDER, FOR SOLUTION INTRAMUSCULAR; INTRAVENOUS at 08:33

## 2018-08-07 RX ADMIN — SODIUM CHLORIDE, POTASSIUM CHLORIDE, SODIUM LACTATE AND CALCIUM CHLORIDE 1000 ML: 600; 310; 30; 20 INJECTION, SOLUTION INTRAVENOUS at 06:42

## 2018-08-07 RX ADMIN — ROCURONIUM BROMIDE 5 MG: 10 INJECTION INTRAVENOUS at 08:44

## 2018-08-07 RX ADMIN — GENTAMICIN SULFATE 80 MG: 80 INJECTION, SOLUTION INTRAVENOUS at 07:44

## 2018-08-07 RX ADMIN — DEXAMETHASONE SODIUM PHOSPHATE 4 MG: 4 INJECTION, SOLUTION INTRA-ARTICULAR; INTRALESIONAL; INTRAMUSCULAR; INTRAVENOUS; SOFT TISSUE at 07:57

## 2018-08-07 RX ADMIN — SODIUM CHLORIDE, POTASSIUM CHLORIDE, SODIUM LACTATE AND CALCIUM CHLORIDE: 600; 310; 30; 20 INJECTION, SOLUTION INTRAVENOUS at 08:43

## 2018-08-07 RX ADMIN — LIDOCAINE HYDROCHLORIDE 0.5 ML: 10 INJECTION, SOLUTION EPIDURAL; INFILTRATION; INTRACAUDAL; PERINEURAL at 06:42

## 2018-08-07 RX ADMIN — IPRATROPIUM BROMIDE AND ALBUTEROL SULFATE 3 ML: 2.5; .5 SOLUTION RESPIRATORY (INHALATION) at 08:01

## 2018-08-07 NOTE — ANESTHESIA PREPROCEDURE EVALUATION
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  Cardiovascular - normal exam  Exercise tolerance: good (4-7 METS)    ECG reviewed  Rhythm: regular  Rate: normal    (+) hypertension well controlled, hyperlipidemia,   (-) pacemaker, past MI, angina, cardiac stents    ROS comment: Patient took Irbesartan this morning    Neuro/Psych  (-) seizures, TIA, CVA  GI/Hepatic/Renal/Endo    (-) GERD, liver disease, no renal disease, diabetes, hypothyroidism, hyperthyroidism    Musculoskeletal     Abdominal    Substance History      OB/GYN          Other                      Anesthesia Plan    ASA 2     general     intravenous induction   Anesthetic plan and risks discussed with patient.

## 2018-08-07 NOTE — DISCHARGE INSTRUCTIONS

## 2018-08-07 NOTE — OP NOTE
"Operative Summary    Karan Piedra  Date of Procedure: 8/7/2018    Pre-op Diagnosis:   Elevated prostate specific antigen (PSA) [R97.20]    Post-op Diagnosis:     Post-Op Diagnosis Codes:     * Elevated prostate specific antigen (PSA) [R97.20]    Procedure/CPT® Codes:      Procedure(s):  PROSTATE ULTRASOUND  URONAV FUSION BIOPSY    Surgeon(s):  Chemo Tierney MD    Anesthesia: General    Staff:   Circulator: Sarah Moon RN; Sylvia Darby RN; Jessica Roberts RN  Scrub Person: Jeremy Salazar    Indications for procedure:  Elevated PSA with history of negative biopsy    Findings:   Width (mm): 51.9   Length (mm): 49.3  Height (mm): 25.3  Volume (cc):  33.9  PSA density:  0.24    Procedure details:  Patient is taken the operating room were he is given general.  He is then placed in left lateral decubitus position.  Previous MRI images are reviewed as they have been loaded into the UroNav system. The electromagnetic generator for probe detection is attached to the bed and positioned appropriately. Using the B&K ultrasound, the transrectal probe is inserted to identify the mid portion of the prostate gland in the transverse image.  Measurements of the width and height of the gland are then obtained.  The multiplanar probe is then used to take sagittal images of the prostate and again measurement is taken of the length of the gland.  The prostate volume is calculated using height times with times length ×1/2 which is then compared to the volume of the MRI.    The pre-procedural MRI (along with the segmentation and targeting data as determined by the radiologist) selected for fusion biopsy is then overlaid \"fused\" to a the 3D TRUS volume of the prostate constructed from a series of 2D TRUS images obtained by a \"sweep\" of the TRUS probe.  Both rigid and elastic registration is carried out using the UroNav software.    The region of interest is identified in the Rightmid of the prostate.  The " "target is identified as drawn by the radiologist using the UroNav software that included the bulls eye with the  end-fire setting of the multiplanar probe. 3 biopsies are taken from the region of interest and labeled as \"region of interest #1 \"on the pathology requisition.  The midportion was biopsied with the other biopsies being a few millimeters away toward the periphery of the lesion by adjusting the probe.      After biopsies of  the suspicious lesion(s) was completed, ultrasound-guided \"random \"biopsies were performed again using ultrasound guidance with the end-fire setting of the multiplanar probe.  I used a standard 12 core template and labeled the biopsy with respect to location.  Specimens were then placed in formalin.    The systematic of both the random biopsies and those of the region of interest(s) were reviewed and felt to be appropriate sampling of each.  The probe was removed.    Estimated Blood Loss: <30 mL    Specimens:                ID Type Source Tests Collected by Time   A : right mid lateral Tissue Prostate TISSUE PATHOLOGY EXAM Chemo Tierney MD 8/7/2018 0751   B : rigth mid medial Tissue Prostate TISSUE PATHOLOGY EXAM Chemo Tierney MD 8/7/2018 0752   C : right lateral base Tissue Prostate TISSUE PATHOLOGY EXAM Chemo Tierney MD 8/7/2018 0752   D : right mid base  Tissue Prostate TISSUE PATHOLOGY EXAM Chemo Tierney MD 8/7/2018 0752   E : right lateral apex Tissue Prostate TISSUE PATHOLOGY EXAM Chemo Tierney MD 8/7/2018 0752   F : right mid apex Tissue Prostate TISSUE PATHOLOGY EXAM Chemo Tierney MD 8/7/2018 0752   G : left  mid lateral Tissue Prostate TISSUE PATHOLOGY EXAM Chemo Tierney MD 8/7/2018 0753   H : left mid medial Tissue Prostate TISSUE PATHOLOGY EXAM Chemo Tierney MD 8/7/2018 0753   I : left lateral base Tissue Prostate TISSUE PATHOLOGY EXAM Chemo Tierney MD 8/7/2018 0753   J : left mid base Tissue Prostate TISSUE PATHOLOGY EXAM Chemo Tierney" MD LETY 8/7/2018 0754   K : left lateral apex Tissue Prostate TISSUE PATHOLOGY EXAM Chemo Tierney MD 8/7/2018 0754   L : left mid apex Tissue Prostate TISSUE PATHOLOGY EXAM Chemo Tierney MD 8/7/2018 0758   M : LESION 1 - TARGETS 1-3 Tissue Prostate TISSUE PATHOLOGY EXAM Chemo Tierney MD 8/7/2018 0916         Drains:   [REMOVED] Continuous Bladder Irrigation Triple-lumen 18 Fr (Removed)       Complications: none    Plan: Await results.     Chemo Tierney MD     Date: 8/7/2018  Time: 9:44 AM

## 2018-08-07 NOTE — INTERVAL H&P NOTE
H&P updated. The patient was examined and the following changes are noted:  Proceed with MRI UroNav fusion biopsy based on MRI lesion(s)

## 2018-08-07 NOTE — H&P (VIEW-ONLY)
Mr. Piedra is 73 y.o. male    CHIEF COMPLAINT: I am here for elevated PSA. My PSA is 8.610    HPI  He is here for follow-up elevated PSA.  His PSA has increased from 6.5-8.6.  His biopsy was negative and his PSA was 6.7.  He denies any significant voiding symptoms.    Lab Results   Component Value Date    PSA 8.610 (H) 07/05/2018    PSA 6.520 (H) 12/22/2017    PSA 5.850 (H) 06/07/2017    PSA    6.7      11/2016      The following portions of the patient's history were reviewed and updated as appropriate: allergies, current medications, past family history, past medical history, past social history, past surgical history and problem list.      Review of Systems   Constitutional: Negative for chills and fever.   Gastrointestinal: Negative for abdominal pain, anal bleeding and blood in stool.   Genitourinary: Negative for flank pain, frequency, hematuria and urgency.           Current Outpatient Prescriptions:   •  aspirin 81 MG EC tablet, Take 81 mg by mouth Daily., Disp: , Rfl:   •  azelastine (ASTELIN) 0.1 % nasal spray, 2 sprays into each nostril 2 (Two) Times a Day. Use in each nostril as directed, Disp: , Rfl:   •  CIALIS 20 MG tablet, , Disp: , Rfl:   •  coenzyme Q10 100 MG capsule, Take 100 mg by mouth Daily., Disp: , Rfl:   •  fexofenadine (ALLEGRA) 180 MG tablet, Take 180 mg by mouth., Disp: , Rfl:   •  fluticasone (FLONASE) 50 MCG/ACT nasal spray, into each nostril., Disp: , Rfl:   •  hydrochlorothiazide (HYDRODIURIL) 25 MG tablet, , Disp: , Rfl:   •  simvastatin (ZOCOR) 10 MG tablet, , Disp: , Rfl:   •  albuterol (PROVENTIL HFA;VENTOLIN HFA) 108 (90 Base) MCG/ACT inhaler, Inhale., Disp: , Rfl:   •  amoxicillin-clavulanate (AUGMENTIN) 875-125 MG per tablet, Take 1 tablet by mouth Every 12 (Twelve) Hours., Disp: 10 tablet, Rfl: 0  •  Fluticasone Furoate-Vilanterol (BREO ELLIPTA) 100-25 MCG/INH aerosol powder , Inhale., Disp: , Rfl:   •  irbesartan (AVAPRO) 300 MG tablet, , Disp: , Rfl:   •  montelukast  "(SINGULAIR) 10 MG tablet, , Disp: , Rfl:     Past Medical History:   Diagnosis Date   • Colon polyps    • High cholesterol    • Hypertension        Past Surgical History:   Procedure Laterality Date   • COLONOSCOPY     • HERNIA REPAIR      abdominal   • PROSTATE ULTRASOUND BIOPSY  12/2016    negative   • ROTATOR CUFF REPAIR     • TONSILLECTOMY         Social History     Social History   • Marital status:      Social History Main Topics   • Smoking status: Never Smoker   • Smokeless tobacco: Never Used   • Alcohol use Yes   • Drug use: Unknown     Other Topics Concern   • Not on file       Family History   Problem Relation Age of Onset   • No Known Problems Father    • No Known Problems Mother          /68   Temp 98.4 °F (36.9 °C)   Ht 174 cm (68.5\")   Wt 90.3 kg (199 lb)   BMI 29.82 kg/m²       Physical Exam  Alert and oriented ×3  Not agitated or distressed  No obvious deformities  No respiratory distress  Skin without pallor or diaphoresis  BILLY: Benign feeling prostate without nodule approximately 30 mL in size.   Penis and testicles are normal       Data  Results for orders placed or performed in visit on 07/12/18   POC Urinalysis Dipstick, Multipro   Result Value Ref Range    Color Yellow Yellow, Straw, Dark Yellow, Soni    Clarity, UA Clear Clear    Glucose, UA Negative Negative, 1000 mg/dL (3+) mg/dL    Bilirubin Negative Negative    Ketones, UA Negative Negative    Specific Gravity  1.020 1.005 - 1.030    Blood, UA Negative Negative    pH, Urine 7.0 5.0 - 8.0    Protein, POC Negative Negative mg/dL    Urobilinogen, UA Normal Normal    Nitrite, UA Negative Negative    Leukocytes Negative Negative     Assessment and Plan  Diagnoses and all orders for this visit:    Elevated prostate specific antigen (PSA)  Comments:  established, worsening  Orders:  -     POC Urinalysis Dipstick, Multipro  -     MRI Pelvis With & Without Contrast; Future    This patient has elevated psa with a history of " previous negative biopsy .  It is recommended that he have a multi parametric magnetic resonance imaging study of the pelvis with and without gadolinium contrast using specific prostate protocol.  It is explained that abnormalities can be seen on a 3 Noris magnet based upon water content and increased contrast enhancement suggesting neovascularity.  Such images can then be used with the Smart Patients[employer] system to allow fusion of the images with live transrectal ultrasound images to increase the accuracy of biopsy.  Another advantage of this system is that it does appear to be more likely to show high risk prostate cancer lesions which would be very important to identify since these are likely to be more aggressive.  The risks of prostate ultrasound and biopsy including infection with or without sepsis, hematuria, amount aspermia, and rectal bleeding are also discussed.  Transient erectile dysfunction is also included in the discussion.  The patient does wish to proceed.      Chemo Tierney MD  07/13/18  6:54 AM      Cc:

## 2018-08-07 NOTE — ANESTHESIA PROCEDURE NOTES
Airway  Urgency: elective    Airway not difficult    General Information and Staff    Patient location during procedure: OR  CRNA: ROBIN HOLLIS    Indications and Patient Condition  Indications for airway management: airway protection    Preoxygenated: yes  Mask difficulty assessment: 1 - vent by mask    Final Airway Details  Final airway type: endotracheal airway      Successful airway: ETT    Successful intubation technique: direct laryngoscopy  Facilitating devices/methods: intubating stylet  Endotracheal tube insertion site: oral  Blade: Ela  Blade size: 3.5.  ETT size: 7.5 mm  Cormack-Lehane Classification: grade IIb - view of arytenoids or posterior of glottis only  Placement verified by: chest auscultation and capnometry   Measured from: gums  ETT to gums (cm): 22  Number of attempts at approach: 1

## 2018-08-07 NOTE — ANESTHESIA POSTPROCEDURE EVALUATION
Patient: Karan Piedra    Procedure Summary     Date:  08/07/18 Room / Location:   [facility]  /  [facility]    Anesthesia Start:  0843 Anesthesia Stop:  0922    Procedure:  PROSTATE ULTRASOUND  URONAV FUSION BIOPSY (N/A ) Diagnosis:       Elevated prostate specific antigen (PSA)      (Elevated prostate specific antigen (PSA) [R97.20])    Surgeon:  Chemo Tierney MD Provider:  Ye Ruggiero CRNA    Anesthesia Type:  general ASA Status:  2          Anesthesia Type: general  Last vitals  BP   112/51 (08/07/18 1015)   Temp   98.4 °F (36.9 °C) (08/07/18 0940)   Pulse   73 (08/07/18 1015)   Resp   16 (08/07/18 1015)     SpO2   93 % (08/07/18 1015)     Post Anesthesia Care and Evaluation    PONV Status: none  Comments: Patient d/c from PACU prior to anes eval based on Cresencio score.  Please see RN notes for details of d/c criteria.    Blood pressure 112/51, pulse 73, temperature 98.4 °F (36.9 °C), temperature source Temporal Artery , resp. rate 16, SpO2 93 %.

## 2018-08-09 LAB
CYTO UR: NORMAL
LAB AP CASE REPORT: NORMAL
LAB AP SYNOPTIC CHECKLIST: NORMAL
PATH REPORT.FINAL DX SPEC: NORMAL
PATH REPORT.GROSS SPEC: NORMAL

## 2018-08-13 ENCOUNTER — OFFICE VISIT (OUTPATIENT)
Dept: UROLOGY | Facility: CLINIC | Age: 73
End: 2018-08-13

## 2018-08-13 DIAGNOSIS — C61 PROSTATE CANCER (HCC): Primary | ICD-10-CM

## 2018-08-13 PROCEDURE — 99215 OFFICE O/P EST HI 40 MIN: CPT | Performed by: UROLOGY

## 2018-08-13 NOTE — PROGRESS NOTES
Prostate Cancer consult  Mr. Piedra is 73 y.o. male    Chief complaint: I am here about my prostate biopsy.    He is here today to discuss his newly diagnosed prostate cancer.  His biopsy was done 2 week(s) ago. This was done in the context of Elevated PSA. Severity best described as adenocarcinoma in 2/15 cores with the maximum mati grade of 3+4. The patient did not need imaging based on risk stratification. . Symptoms include none.  The patients potency status can be defined as reasonable erections on most attempts.                                         s  Current Outpatient Prescriptions:   •  albuterol (PROVENTIL HFA;VENTOLIN HFA) 108 (90 Base) MCG/ACT inhaler, Inhale 2 puffs Every 4 (Four) Hours As Needed for Shortness of Air (HAS NOT USED IN SEVERAL MONTHS)., Disp: , Rfl:   •  aspirin 81 MG EC tablet, Take 81 mg by mouth Daily., Disp: , Rfl:   •  azithromycin (ZITHROMAX) 500 MG tablet, Take 500 mg by mouth 3 (Three) Times a Week., Disp: , Rfl:   •  CIALIS 20 MG tablet, Take 10 mg by mouth Daily As Needed for erectile dysfunction., Disp: , Rfl:   •  coenzyme Q10 100 MG capsule, Take 100 mg by mouth Daily., Disp: , Rfl:   •  ethambutol (MYAMBUTOL) 400 MG tablet, Take 400 mg by mouth 3 (Three) Times a Week., Disp: , Rfl:   •  fexofenadine (ALLEGRA) 180 MG tablet, Take 180 mg by mouth Daily., Disp: , Rfl:   •  hydrochlorothiazide (HYDRODIURIL) 25 MG tablet, Take 25 mg by mouth Daily., Disp: , Rfl:   •  irbesartan (AVAPRO) 300 MG tablet, Take 300 mg by mouth Daily., Disp: , Rfl:   •  montelukast (SINGULAIR) 10 MG tablet, Take 10 mg by mouth Every Night., Disp: , Rfl:   •  Multiple Vitamins-Minerals (VISION-KATARINA PRESERVE PO), Take 1 tablet by mouth Daily., Disp: , Rfl:   •  rifAMPin (RIFADIN) 300 MG capsule, Take 300 mg by mouth 3 (Three) Times a Week., Disp: , Rfl:   •  rosuvastatin (CRESTOR) 10 MG tablet, Take 10 mg by mouth Daily., Disp: , Rfl:     Past Medical History:   Diagnosis Date   • Arthritis    •  Colon polyps    • High cholesterol    • Hypertension    • Mycobacterium avium complex (CMS/HCC)    • PSA elevation    • Seasonal allergies        Past Surgical History:   Procedure Laterality Date   • COLONOSCOPY     • HERNIA REPAIR      abdominal   • PROSTATE BIOPSY N/A 8/7/2018    Procedure: PROSTATE ULTRASOUND  URONAV FUSION BIOPSY;  Surgeon: Chemo Tierney MD;  Location: [facility];  Service: Urology   • PROSTATE ULTRASOUND BIOPSY  12/2016    negative   • ROTATOR CUFF REPAIR     • TONSILLECTOMY           Assessment and Plan  Diagnoses and all orders for this visit:    Prostate cancer (CMS/HCC)  -     Ambulatory Referral to Radiation Oncology-[place]        After finding out the patient has done well from the biopsy, we went over the pathology report including the assignment and application of the [place] score, the volume of prostate cancer as predicted by the number of cores and percent involvement in each, We went over how this is used to determine whether or not the patient needs a bone scan and or CT scan of the abdomen and pelvis in addition to other preoperative workup. We talked about the use of a Chris nomogram to predict whether or not this prostate cancer is likely to still be confined to the gland.     We categorize him as: intermediate risk disease.     Active Surveillance  Active Surveillance is explained as a technique that involves following the cancer because in most patients there is a significant lag time between diagnosis and spread beyond the prostate gland. This type of surveillance is different than watchful waiting because it provides a way to closely follow the patient using BILLY, PSA and repeat biopsies to assess an increase in volume or grade of tumor in hopes of still providing curative therapy while delaying the possible side effects that go with therapy as opposed to not treating the patient until there are symptoms of metastatic disease. The former treatment appears to be  beneficial for those patients that have low risk disease but a greater than ten year life expectancy. The psychological manifestations of observing a clinically confined cancer are considered and discussed. The latter treatment is only appropriate in those patients whose life expectancy is less than ten years. I emphasized the risks of repeat biopsy. We talked about the experience in  countries in this technique and that a joint study with the [place] and [place] shows promise but will not be complete with definitive recommendations for a few years. It is also emphasized that some patients did not have a cancer free survival and that some patients  in the active surveillance groups but that there was no statistical significance when compared to the group treated immediately after diagnosis.     Additional information reviewed with the patient and provided to him on active surveillance as follows:   Which patients are the best candidates for no immediate treatment or active surveillance?   Prostate cancer is the most prevalent male cancer, but the majority of men with the disease do not die of prostate cancer. Studies suggest that 30-50 percent of men over the age of 60 years diagnosed with prostate cancer today by PSA screening undergo a treatment that will not extend their life or improve its quality. This does not mean that prostate cancer does not kill men, but rather that some men who are older and/or in poor health with a slowly progressive form of the disease, may not need immediate treatment. The key is to identify the men who for now can safely forego treatment.   Eligible men should meet the following criteria for Very Low or Low Risk Disease:   Very Low Risk Prostate Cancer   · Life expectancy less than 20 years   · Cancer not felt on digital rectal examination (stage T1c)   · PSA density (PSA divided by prostate volume) is less than 0.15   · Aliza score is 6 or less with no [place] pattern 4 or 5    · No more than 2 cores with cancer, or cancer involving no more than 50% of any core on at least a 12 core biopsy   Low Risk Prostate Cancer   · Life expectancy less than 10-15 years   · Cancer not felt on digital rectal examination and/or small nodule (stage T1c or T2a)   · PSA below 10ng/ml   · [place] score is 6 or less with no Aliza pattern 4 or 5 on at least a 12 core biopsy   What does active surveillance mean?   In the past, the term watchful waiting meant no treatment until the development of metastatic disease, at which time androgen ablation (hormonal) therapy was initiated.     Today, men who have very low to low risk prostate cancer, and who choose no immediate treatment as an alternative to immediate treatment, are closely monitored with intervention -if necessary- at a time when cure is still possible. We now refer to this as active surveillance with selective delayed curative intervention. This means that men undergo periodic evaluations including PSA tests, digital rectal examinations, and prostate biopsies. If there is evidence that the cancer is progressing, treatment is recommended with the intention of curing the disease.     We discussed what rationale is used to define lower risk prostate cancer based on biopsy results.   Dr. Paramjit Carrillo, an expert in prostate pathology at [facility], has shown in two separate studies that cancers less than 0.5 cc (smaller than an eraser tip) can be identified correctly about 75-80 percent of the time using PSA and information from the prostate biopsy. Dr. Carrillo's criteria provide a method for helping men choose between immediate treatment and active surveillance.   If a man decides on active surveillance, what recommendations are made for follow-up of the cancer?   · 12-14 core prostate biopsy periodically until age 75 years   · PSA and Digital Rectal Examination every six months     Studies from [facility] active surveillance program  have shown that PSA level changes are not sufficient to alert us as to whether or not the cancer is changing. This necessitates surveillance biopsies for careful monitoring. For those men that have a biopsy done about one year after the original biopsy that shows no cancer, we recommend that the interval between biopsies be lengthened to 18-24 months. This interval may increase with time e.g. every 2-3 years. Prostate biopsies may be discontinued at age 75 years, since prostate cancer is unlikely to cause harm beyond this age if a man has not yet had a change in the cancer with careful monitoring.   How does a man who qualifies for surveillance make a choice between active surveillance and treatment?     I also explained that I do not encourage healthy men in their 50's to strongly consider active surveillance because of their longer life expectancy and time horizon for cancer progression. For older men, especially over age 65 years who meet criteria, active surveillance is one of the options that should be seriously considered.     The men best suited for surveillance would appear to be those that:   · have the ability to live with cancer without worry reducing their quality of life   · are most concerned about the potential side effects of treatments   · value near term quality of life to a greater extent than any long term consequences that could occur   Each man should carefully weigh the potential loss of quality of life with treatment (radiation or surgery), against the possibility that the window of opportunity for cure will disappear without treatment.   What does a man have to lose with active surveillance of a PSA-detected cancer?   The major concern is that while surveillance is taking place, the tumor will progress beyond the prostate to the point where cure is no longer possible. Experience to date with active surveillance allows a number of conclusions regarding this risk.   · About 30 percent of the  men who have entered surveillance have undergone treatment within 5 years   · High grade cancers that are [place] score 7 or more are found on surveillance biopsies at a rate of about 4% per year   · Most men who undergo treatment do not have high grade cancers   · Recognizing the probability of death from prostate cancer among men who are treated for high grade cancers, it is estimated that a man at age 65 years who enters surveillance with very low risk prostate cancer has   · a 20 year risk of death from prostate cancer of approximately 5%   · a 20 year risk of dying of another cause of approximately 60%     Healthy Living Tips   If a man chooses active surveillance, what dietary changes should he make?   Most men ask what they should be doing in terms of lifestyle changes to help prevent progression of their disease. This is an area of intense interest now but it's one that is clouded by the fact that dietary supplements are a billion dollar industry in the [place] This often makes it hard for consumers to distinguish between science and marketing.     The Prostate Cancer Foundation (PCF.org <http://www.pcf.org/>) has a free guide that can be ordered from their website titled Nutrition, Exercise, and Prostate Cancer. I would advise all men with prostate cancer to read this. Also, the American Cancer Society <http://www.cancer.org/Healthy/EatHealthyGetActive/ACSGuidelinesonNutritionPhysicalActivityforCancerPrevention/hqty-vhtjzjbozq-lyq>has up to date information on lifestyle choices and cancer prevention.   The bottom line is that a diet that is low in animal and dairy fat, high in a wide variety of fruits and vegetables and healthy grains and nuts, is thought be a diet that can slow the progression of cancer. Maintaining a healthy weight with daily exercise is also considered important.     Recent studies have shown that a man's lifestyle-especially nutrition and exercise-has a significant influence in prostate  cancer prevention and treatment.   · Follow these tips to help you live a healthier life   · Lose body fat by eating fewer calories per day than you burn   · Maintain muscle mass by increasing protein intake and exercise   · Cut carbohydrate intake to cut down on excess fat and weight, which can slow tumor growth   · Exercise every day, combining cardio fitness and weight lifting   · Eat nine servings a day of colorful fruits and vegetables   · Reduce stress by focusing on living a balanced life and taking care of yourself   · Plan ahead to eat healthfully and minimize stress   · Track your behaviors and help chart your progress   · Establish a support system by maintaining healthy relationships with people who understand what you are going through     What about patients with intermediate disease risk?   Intermediate disease risk is best defined as [place] Grade 7 disease (preferably 3+4), PSA >10, or palpable disease over 2cm on one side or both.   Studies show that the risk of death on active surveillance with intermediate disease is approximately 15% compared to approximately 5% with low risk disease.   Future studies will likely reveal reasonable candidates for intermediate risk disease.     Robot Assisted Laparoscopic Prostatectomy  We went over the rationale of the use of robot assisted laparoscopic prostatectomy. I explained my experience with the procedure. I explained to him that I feel confident with this technique because I have done nearly 300 cases in six years. I explained that I had not done that many open radical prostatectomies the previous ten years of practice combined which I believe plays a role in my comfort level. I explained that physicians that do that many open retropubic prostatectomies or perineal prostatectomies likely feel that they offer more benefit to patients with their respective technique because they are more proficient, familiar and comfortable. My experience with cancer  control is discussed as well as the fact that cancer free survival from this technique are based on comparing early success and that long term comparisons aren't available yet. Clinically it is my impression that this procedure offers excellent local control with comparable intermediate cure rates. I also explained the technique of open retropubic prostatectomy. We discussed how previous abdominal surgery makes the operation more challenging in addition to body habitus and previous other anatomic variations that sometimes cause the procedure to last longer than expected or require conversion to the open procedure. A shorter hospital stay, more rapid progression during convalescence, and considerable decrease in blood loss due to pneumoperitoneum are explained. Complications of pneumoperitoneum and trochar placement are explained as a unique problem with robot prostatectomy. Preservation of continence and erections are discussed and compared with open surgery, radiation and other modalities of treatment. The role of early penile rehabilitation for more rapid resumption of erectile treatment is also discussed. Bladder neck contracture, Vesicourethral anastomotic leak, ureteral obstruction, and rectal injury are also discussed.     Radiation therapy options  We also talked about the types of radiation therapy are available. I explained to him the difference in traditional external beam radiotherapy versus three-dimensional conformal IMR T. I explained that the latter seems to reduce the risks of damage to surrounding tissue with no decrease in the benefit of overall cancer treatment in studies. I did explain that typically this requires the placement of prostate side a shearing markers which will require an additional transrectal ultrasound with transrectal puncture to place these markers. He was explained that this is to accommodate the radiation oncologist in locating the prostate gland at each therapy session. I did  explain that 1 disadvantages of this technique as the time consumption 8-9 consecutive weeks of treatment. We also discussed the used to prostate interstitial seed implantation. I explained to him the role of a volume study. It is also explained that radioactive seeds are implanted which increases the overall dose of radiation to the prostate while limiting the exposure to the surrounding tissues. I did also explain that our experience with this type of treatment in the limited number of these therapies that we did lead us to make a decision to no longer offer this to patient's locally. I did explain that I have a referral basis would be delighted to send this patient. We also briefly discussed proton therapy since that is not something available in our area. I explained the basics that normal tissue and a fixed distance from the target receives less scatter radiation from proton therapy than x-rays. This is this is the theoretical advantage to use of proton therapy versus IMRT.     Systemic Therapy  We talked about the role of chemotherapy and hormonal therapy in this setting. I explained that the former is used in patients that have failed primary curative therapy and have radiographic evidence of metastatic disease. It is generally preserved for patients that have failed hormonal therapy because of its high toxicity. Hormonal therapy and prostate cancer is explained. Both orchiectomy as well as systemic therapy using gonadotropin releasing hormones is explained to the patient. The benefits of hormonal therapy is explained as palliative or adjunctive but not curative. The benefit as shown clinically by neoadjuvant hormonal therapy combined with radiotherapy in high risk patients is explained. I also went over the risks of hormonal therapy including the metabolic effects that may lead to metabolic syndrome. The latter is explained at increased risk in heart attack stroke and death. The metabolic effects on bone are  explained including the increased possibility of fracture due to osteoporosis. It is explained at vitamin D and calcium supplementation is the recommendation upon initiation of the stalk treatments. Sexual side effects will also testosterone were also discussed.     Cryotherapy  Cryotherapy is explained as freezing of the prostate gland which is believed to kill prostate cancer cells. Clinical studies have shown a benefit for patients with organ confined prostate cancer. I told the patient that we don't offer this therapy, but also I really have never had a patient that has undergone cryotherapy, but I did offer to find a regional urologist for them to see that does it. I explained that it can also be used to treat patients that have failed initial radiation therapy.    Based on this discussion, the patient is leaning towards robot-assisted lap scopic prostatectomy but does wish to seek second opinion from radiation oncology which I think is a good idea.    More than half of this 75 minute visit was spent on coordination of care and counseling the patient about the biology of prostate cancer, his biopsy report, risk stratification and management options .  The entire time allotted was spent as face to face time with the patient.       Assessment and Plan  Diagnoses and all orders for this visit:    Prostate cancer (CMS/[facility])  -     Ambulatory Referral to Radiation Oncology-[place]                (Please note that portions of this note were completed with a voice recognition program.)Chemo Tierney MD  08/13/18  2:58 PM      Cc:

## 2018-08-17 PROBLEM — C61 PROSTATE CANCER (HCC): Status: ACTIVE | Noted: 2018-08-17

## 2018-08-23 PROBLEM — Z78.9 NON-SMOKER: Status: ACTIVE | Noted: 2018-08-23

## 2018-08-23 PROBLEM — Z71.9 ENCOUNTER FOR CONSULTATION: Status: ACTIVE | Noted: 2018-08-23

## 2018-08-24 ENCOUNTER — CONSULT (OUTPATIENT)
Dept: RADIATION ONCOLOGY | Facility: HOSPITAL | Age: 73
End: 2018-08-24

## 2018-08-24 VITALS
WEIGHT: 199 LBS | SYSTOLIC BLOOD PRESSURE: 128 MMHG | DIASTOLIC BLOOD PRESSURE: 68 MMHG | BODY MASS INDEX: 30.16 KG/M2 | HEIGHT: 68 IN

## 2018-08-24 DIAGNOSIS — Z78.9 NON-SMOKER: ICD-10-CM

## 2018-08-24 DIAGNOSIS — C61 PROSTATE CANCER (HCC): Primary | ICD-10-CM

## 2018-08-24 DIAGNOSIS — Z71.9 ENCOUNTER FOR CONSULTATION: ICD-10-CM

## 2018-08-24 PROCEDURE — G0463 HOSPITAL OUTPT CLINIC VISIT: HCPCS | Performed by: RADIOLOGY

## 2018-08-29 ENCOUNTER — TELEPHONE (OUTPATIENT)
Dept: UROLOGY | Facility: CLINIC | Age: 73
End: 2018-08-29

## 2018-08-29 NOTE — TELEPHONE ENCOUNTER
Pt called and stated he would like to go ahead with the surgery, and stated he would need to to wait until the he get back from vac,  He would like to get done around first of Nov. If possible,    And he would like to know if he needs to be taking the shots?  Please let me know thanks.

## 2018-08-31 ENCOUNTER — PREP FOR SURGERY (OUTPATIENT)
Dept: OTHER | Facility: HOSPITAL | Age: 73
End: 2018-08-31

## 2018-08-31 DIAGNOSIS — C61 PROSTATE CANCER (HCC): Primary | ICD-10-CM

## 2018-09-04 ENCOUNTER — TRANSCRIBE ORDERS (OUTPATIENT)
Dept: ADMINISTRATIVE | Facility: HOSPITAL | Age: 73
End: 2018-09-04

## 2018-09-04 ENCOUNTER — APPOINTMENT (OUTPATIENT)
Dept: LAB | Facility: HOSPITAL | Age: 73
End: 2018-09-04
Attending: INTERNAL MEDICINE

## 2018-09-04 DIAGNOSIS — A31.9: Primary | ICD-10-CM

## 2018-09-04 LAB
ALBUMIN SERPL-MCNC: 4.6 G/DL (ref 3.5–5)
ALBUMIN/GLOB SERPL: 1.6 G/DL (ref 1.1–2.5)
ALP SERPL-CCNC: 110 U/L (ref 24–120)
ALT SERPL W P-5'-P-CCNC: 34 U/L (ref 0–54)
ANION GAP SERPL CALCULATED.3IONS-SCNC: 11 MMOL/L (ref 4–13)
AST SERPL-CCNC: 32 U/L (ref 7–45)
BASOPHILS # BLD AUTO: 0.03 10*3/MM3 (ref 0–0.2)
BASOPHILS NFR BLD AUTO: 0.5 % (ref 0–2)
BILIRUB SERPL-MCNC: 0.8 MG/DL (ref 0.1–1)
BUN BLD-MCNC: 18 MG/DL (ref 5–21)
BUN/CREAT SERPL: 22.5 (ref 7–25)
CALCIUM SPEC-SCNC: 9 MG/DL (ref 8.4–10.4)
CHLORIDE SERPL-SCNC: 102 MMOL/L (ref 98–110)
CO2 SERPL-SCNC: 29 MMOL/L (ref 24–31)
CREAT BLD-MCNC: 0.8 MG/DL (ref 0.5–1.4)
DEPRECATED RDW RBC AUTO: 39.4 FL (ref 40–54)
EOSINOPHIL # BLD AUTO: 0.14 10*3/MM3 (ref 0–0.7)
EOSINOPHIL NFR BLD AUTO: 2.4 % (ref 0–4)
ERYTHROCYTE [DISTWIDTH] IN BLOOD BY AUTOMATED COUNT: 12.5 % (ref 12–15)
GFR SERPL CREATININE-BSD FRML MDRD: 95 ML/MIN/1.73
GLOBULIN UR ELPH-MCNC: 2.9 GM/DL
GLUCOSE BLD-MCNC: 103 MG/DL (ref 70–100)
HCT VFR BLD AUTO: 44.6 % (ref 40–52)
HGB BLD-MCNC: 15.1 G/DL (ref 14–18)
IMM GRANULOCYTES # BLD: 0.02 10*3/MM3 (ref 0–0.03)
IMM GRANULOCYTES NFR BLD: 0.3 % (ref 0–5)
LYMPHOCYTES # BLD AUTO: 1.46 10*3/MM3 (ref 0.72–4.86)
LYMPHOCYTES NFR BLD AUTO: 24.7 % (ref 15–45)
MCH RBC QN AUTO: 29.2 PG (ref 28–32)
MCHC RBC AUTO-ENTMCNC: 33.9 G/DL (ref 33–36)
MCV RBC AUTO: 86.1 FL (ref 82–95)
MONOCYTES # BLD AUTO: 0.59 10*3/MM3 (ref 0.19–1.3)
MONOCYTES NFR BLD AUTO: 10 % (ref 4–12)
NEUTROPHILS # BLD AUTO: 3.66 10*3/MM3 (ref 1.87–8.4)
NEUTROPHILS NFR BLD AUTO: 62.1 % (ref 39–78)
NRBC BLD MANUAL-RTO: 0 /100 WBC (ref 0–0)
PLATELET # BLD AUTO: 214 10*3/MM3 (ref 130–400)
PMV BLD AUTO: 9.2 FL (ref 6–12)
POTASSIUM BLD-SCNC: 3.7 MMOL/L (ref 3.5–5.3)
PROT SERPL-MCNC: 7.5 G/DL (ref 6.3–8.7)
RBC # BLD AUTO: 5.18 10*6/MM3 (ref 4.8–5.9)
SODIUM BLD-SCNC: 142 MMOL/L (ref 135–145)
WBC NRBC COR # BLD: 5.9 10*3/MM3 (ref 4.8–10.8)

## 2018-09-04 PROCEDURE — 85025 COMPLETE CBC W/AUTO DIFF WBC: CPT | Performed by: INTERNAL MEDICINE

## 2018-09-04 PROCEDURE — 36415 COLL VENOUS BLD VENIPUNCTURE: CPT

## 2018-09-04 PROCEDURE — 80053 COMPREHEN METABOLIC PANEL: CPT | Performed by: INTERNAL MEDICINE

## 2018-10-09 ENCOUNTER — APPOINTMENT (OUTPATIENT)
Dept: LAB | Facility: HOSPITAL | Age: 73
End: 2018-10-09
Attending: INTERNAL MEDICINE

## 2018-10-09 ENCOUNTER — TRANSCRIBE ORDERS (OUTPATIENT)
Dept: ADMINISTRATIVE | Facility: HOSPITAL | Age: 73
End: 2018-10-09

## 2018-10-09 DIAGNOSIS — A31.0 PULMONARY DISEASE DUE TO MYCOBACTERIA (HCC): Primary | ICD-10-CM

## 2018-10-09 PROCEDURE — 87116 MYCOBACTERIA CULTURE: CPT | Performed by: INTERNAL MEDICINE

## 2018-10-09 PROCEDURE — 87206 SMEAR FLUORESCENT/ACID STAI: CPT | Performed by: INTERNAL MEDICINE

## 2018-10-24 ENCOUNTER — TELEPHONE (OUTPATIENT)
Dept: INTERNAL MEDICINE | Age: 73
End: 2018-10-24

## 2018-10-26 ENCOUNTER — APPOINTMENT (OUTPATIENT)
Dept: LAB | Facility: HOSPITAL | Age: 73
End: 2018-10-26
Attending: INTERNAL MEDICINE

## 2018-10-26 ENCOUNTER — TRANSCRIBE ORDERS (OUTPATIENT)
Dept: ADMINISTRATIVE | Facility: HOSPITAL | Age: 73
End: 2018-10-26

## 2018-10-26 DIAGNOSIS — A31.0 PULMONARY DISEASE DUE TO MYCOBACTERIA (HCC): Primary | ICD-10-CM

## 2018-10-26 LAB
BASOPHILS # BLD AUTO: 0.06 10*3/MM3 (ref 0–0.2)
BASOPHILS NFR BLD AUTO: 1 % (ref 0–2)
DEPRECATED RDW RBC AUTO: 37.2 FL (ref 40–54)
EOSINOPHIL # BLD AUTO: 0.19 10*3/MM3 (ref 0–0.7)
EOSINOPHIL NFR BLD AUTO: 3 % (ref 0–4)
ERYTHROCYTE [DISTWIDTH] IN BLOOD BY AUTOMATED COUNT: 12.3 % (ref 12–15)
HCT VFR BLD AUTO: 44.6 % (ref 40–52)
HGB BLD-MCNC: 15.7 G/DL (ref 14–18)
IMM GRANULOCYTES # BLD: 0.04 10*3/MM3 (ref 0–0.03)
IMM GRANULOCYTES NFR BLD: 0.6 % (ref 0–5)
LYMPHOCYTES # BLD AUTO: 1.5 10*3/MM3 (ref 0.72–4.86)
LYMPHOCYTES NFR BLD AUTO: 24 % (ref 15–45)
MCH RBC QN AUTO: 29.1 PG (ref 28–32)
MCHC RBC AUTO-ENTMCNC: 35.2 G/DL (ref 33–36)
MCV RBC AUTO: 82.7 FL (ref 82–95)
MONOCYTES # BLD AUTO: 0.49 10*3/MM3 (ref 0.19–1.3)
MONOCYTES NFR BLD AUTO: 7.9 % (ref 4–12)
NEUTROPHILS # BLD AUTO: 3.96 10*3/MM3 (ref 1.87–8.4)
NEUTROPHILS NFR BLD AUTO: 63.5 % (ref 39–78)
NRBC BLD MANUAL-RTO: 0 /100 WBC (ref 0–0)
PLATELET # BLD AUTO: 98 10*3/MM3 (ref 130–400)
PMV BLD AUTO: 9.4 FL (ref 6–12)
RBC # BLD AUTO: 5.39 10*6/MM3 (ref 4.8–5.9)
RBC MORPH BLD: NORMAL
SMALL PLATELETS BLD QL SMEAR: NORMAL
WBC MORPH BLD: NORMAL
WBC NRBC COR # BLD: 6.24 10*3/MM3 (ref 4.8–10.8)

## 2018-10-26 PROCEDURE — 36415 COLL VENOUS BLD VENIPUNCTURE: CPT

## 2018-10-26 PROCEDURE — 85007 BL SMEAR W/DIFF WBC COUNT: CPT | Performed by: INTERNAL MEDICINE

## 2018-10-26 PROCEDURE — 85025 COMPLETE CBC W/AUTO DIFF WBC: CPT | Performed by: INTERNAL MEDICINE

## 2018-11-05 ENCOUNTER — TELEPHONE (OUTPATIENT)
Dept: INTERNAL MEDICINE | Age: 73
End: 2018-11-05

## 2018-11-06 ENCOUNTER — APPOINTMENT (OUTPATIENT)
Dept: PREADMISSION TESTING | Facility: HOSPITAL | Age: 73
End: 2018-11-06

## 2018-11-06 VITALS
WEIGHT: 203.26 LBS | SYSTOLIC BLOOD PRESSURE: 116 MMHG | HEIGHT: 69 IN | RESPIRATION RATE: 16 BRPM | OXYGEN SATURATION: 97 % | HEART RATE: 70 BPM | BODY MASS INDEX: 30.11 KG/M2 | DIASTOLIC BLOOD PRESSURE: 61 MMHG

## 2018-11-06 DIAGNOSIS — C61 PROSTATE CANCER (HCC): ICD-10-CM

## 2018-11-06 LAB
ALBUMIN SERPL-MCNC: 4.6 G/DL (ref 3.5–5)
ALBUMIN/GLOB SERPL: 1.6 G/DL (ref 1.1–2.5)
ALP SERPL-CCNC: 129 U/L (ref 24–120)
ALT SERPL W P-5'-P-CCNC: 34 U/L (ref 0–54)
ANION GAP SERPL CALCULATED.3IONS-SCNC: 13 MMOL/L (ref 4–13)
AST SERPL-CCNC: 33 U/L (ref 7–45)
BILIRUB SERPL-MCNC: 0.6 MG/DL (ref 0.1–1)
BILIRUB UR QL STRIP: NEGATIVE
BUN BLD-MCNC: 19 MG/DL (ref 5–21)
BUN/CREAT SERPL: 25 (ref 7–25)
CALCIUM SPEC-SCNC: 9.2 MG/DL (ref 8.4–10.4)
CHLORIDE SERPL-SCNC: 101 MMOL/L (ref 98–110)
CLARITY UR: CLEAR
CO2 SERPL-SCNC: 27 MMOL/L (ref 24–31)
COLOR UR: YELLOW
CREAT BLD-MCNC: 0.76 MG/DL (ref 0.5–1.4)
DEPRECATED RDW RBC AUTO: 36.8 FL (ref 40–54)
ERYTHROCYTE [DISTWIDTH] IN BLOOD BY AUTOMATED COUNT: 12 % (ref 12–15)
GFR SERPL CREATININE-BSD FRML MDRD: 101 ML/MIN/1.73
GLOBULIN UR ELPH-MCNC: 2.9 GM/DL
GLUCOSE BLD-MCNC: 91 MG/DL (ref 70–100)
GLUCOSE UR STRIP-MCNC: NEGATIVE MG/DL
HCT VFR BLD AUTO: 43.4 % (ref 40–52)
HGB BLD-MCNC: 15.1 G/DL (ref 14–18)
HGB UR QL STRIP.AUTO: NEGATIVE
KETONES UR QL STRIP: NEGATIVE
LEUKOCYTE ESTERASE UR QL STRIP.AUTO: NEGATIVE
MCH RBC QN AUTO: 28.8 PG (ref 28–32)
MCHC RBC AUTO-ENTMCNC: 34.8 G/DL (ref 33–36)
MCV RBC AUTO: 82.8 FL (ref 82–95)
NITRITE UR QL STRIP: NEGATIVE
PH UR STRIP.AUTO: 6 [PH] (ref 5–8)
PLATELET # BLD AUTO: 225 10*3/MM3 (ref 130–400)
PMV BLD AUTO: 9.2 FL (ref 6–12)
POTASSIUM BLD-SCNC: 4 MMOL/L (ref 3.5–5.3)
PROT SERPL-MCNC: 7.5 G/DL (ref 6.3–8.7)
PROT UR QL STRIP: NEGATIVE
RBC # BLD AUTO: 5.24 10*6/MM3 (ref 4.8–5.9)
SODIUM BLD-SCNC: 141 MMOL/L (ref 135–145)
SP GR UR STRIP: 1.02 (ref 1–1.03)
UROBILINOGEN UR QL STRIP: NORMAL
WBC NRBC COR # BLD: 5.27 10*3/MM3 (ref 4.8–10.8)

## 2018-11-06 PROCEDURE — 85027 COMPLETE CBC AUTOMATED: CPT | Performed by: UROLOGY

## 2018-11-06 PROCEDURE — 36415 COLL VENOUS BLD VENIPUNCTURE: CPT

## 2018-11-06 PROCEDURE — 81003 URINALYSIS AUTO W/O SCOPE: CPT | Performed by: UROLOGY

## 2018-11-06 PROCEDURE — 80053 COMPREHEN METABOLIC PANEL: CPT | Performed by: UROLOGY

## 2018-11-06 RX ORDER — SILDENAFIL 100 MG/1
100 TABLET, FILM COATED ORAL DAILY PRN
COMMUNITY
End: 2018-12-28

## 2018-11-06 NOTE — DISCHARGE INSTRUCTIONS
Laparoscopic Prostatectomy, Care After  This sheet gives you information about how to care for yourself after your procedure. Your health care provider may also give you more specific instructions. If you have problems or questions, contact your health care provider.  What can I expect after the procedure?  After the procedure, it is common to have:  · Pain.  · Abdominal discomfort.  · Nausea.    Follow these instructions at home:  Medicines  · Take over-the-counter and prescription medicines only as told by your health care provider.  · If you were prescribed an antibiotic medicine, take it as told by your health care provider. Do not stop taking the antibiotic even if you start to feel better.  Activity  · Increase your activity level slowly. Being active after your surgery is important because it reduces your risk of developing blood clots.  · Get out of bed as much as possible, but do not do activities that require a lot of energy. Walk around as tolerated.  · For the first 10 days after your procedure, avoid:  ? Lifting.  ? Straining.  ? Running.  ? Walking more than a couple of blocks.  ? Driving or riding in a car for long periods of time.  ? Sexual activity.  Diet  · Avoid alcohol and drinks with caffeine for 2 weeks. Alcohol and caffeine irritate the bladder.  · Avoid spicy foods. These can irritate the bladder.  · To prevent or treat constipation while you are taking prescription pain medicine, your health care provider may recommend that you:  ? Drink enough fluid to keep your urine clear or pale yellow.  ? Take over-the-counter or prescription medicines.  ? Eat foods that are high in fiber, such as fresh fruits and vegetables, whole grains, and beans.  ? Limit foods that are high in fat and processed sugars, such as fried and sweet foods.  Bowel and bladder care  · Follow your health care provider's instructions about caring for your Guan catheter.  · Urinate when you feel the need to. Do not hold in  your urine for long periods of time.  · Do not strain to have a bowel movement. Straining increases the chances of bleeding.  Incision care    · Follow instructions from your health care provider about how to take care of your incisions. Make sure you:  ? Wash your hands with soap and water before you change your bandage (dressing). If soap and water are not available, use hand .  ? Change your dressing as told by your health care provider.  ? Leave stitches (sutures) or adhesive strips in place. These skin closures may need to stay in place for 2 weeks or longer. If adhesive strip edges start to loosen and curl up, you may trim the loose edges. Do not remove adhesive strips completely unless your health care provider tells you to do that.  · Check your incision area every day for signs of infection. Check for:  ? Redness, swelling, or pain.  ? Fluid or blood discharge.  ? Warmth.  ? Pus or a bad smell.  General instructions  · Do coughing and deep breathing exercises as told by your health care provider. It may be helpful to take your pain medicines before doing these exercises.  · Do not use any products that contain nicotine or tobacco, such as cigarettes and e-cigarettes. If you need help quitting, ask your health care provider.  · Keep all follow-up visits as told by your health care provider. This is important.  Contact a health care provider if:  · You have redness, swelling, or pain around an incision.  · You have more fluid or blood coming from an incision.  · An incision feels warm to the touch.  · You have pus or a bad smell coming from an incision.  · You have a fever.  · An incision breaks open before or after sutures or staples have been removed.  Get help right away if:  · Your catheter stops draining urine.  · Your abdomen becomes swollen.  · You develop shortness of breath.  · You have a lot of bleeding from your incision.  · You have a high fever that does not go away.  · You develop pain  in your chest, back, or abdomen.  · You develop pain or swelling in your legs.  · You suddenly gain weight.  Summary  · After your procedure, it is common to have pain, abdominal discomfort, and nausea.  · Take over-the-counter and prescription medicines only as told by your health care provider. Finish all the prescribed antibiotic medicines, even if you start to feel better.  · Increase your activity level slowly to prevent blood clots.  · Follow your health care provider's instructions about how to take care of your incisions.  · Get help right away if you develop shortness of breath, or if you develop chest pain.  This information is not intended to replace advice given to you by your health care provider. Make sure you discuss any questions you have with your health care provider.  Document Released: 12/18/2006 Document Revised: 11/22/2017 Document Reviewed: 11/22/2017  [employer] Interactive Patient Education © 2018 [employer]      DAY OF SURGERY INSTRUCTIONS        YOUR SURGEON: ***DORIAN SHOEMAKER     PROCEDURE: ***PROSTATECTOMY    DATE OF SURGERY: ***NOVEMBER 13,2018    ARRIVAL TIME: AS DIRECTED BY OFFICE    DAY OF SURGERY TAKE ONLY THESE MEDICATIONS UNLESS OTHERWISE INSTRUCTED BY YOUR PHYSICIAN: ***NONE        MANAGING PAIN AFTER SURGERY    We know you are probably wondering what your pain will be like after surgery.  Following surgery it is unrealistic to expect you will not have pain.   Pain is how our bodies let us know that something is wrong or cautions us to be careful.  That said, our goal is to make your pain tolerable.    Methods we may use to treat your pain include (oral or IV medications, PCAs, epidurals, nerve blocks, etc.)   While some procedures require IV pain medications for a short time after surgery, transitioning to pain medications by mouth allows for better management of pain.   Your nurse will encourage you to take oral pain medications whenever possible.  IV medications work almost  immediately, but only last a short while.  Taking medications by mouth allows for a more constant level of medication in your blood stream for a longer period of time.      Once your pain is out of control it is harder to get back under control.  It is important you are aware when your next dose of pain medication is due.  If you are admitted, your nurse may write the time of your next dose on the white board in your room to help you remember.      We are interested in your pain and encourage you to inform us about aggravating factors during your visit.   Many times a simple repositioning every few hours can make a big difference.    If your physician says it is okay, do not let your pain prevent you from getting out of bed. Be sure to call your nurse for assistance prior to getting up so you do not fall.      Before surgery, please decide your tolerable pain goal.  These faces help describe the pain ratings we use on a 0-10 scale.   Be prepared to tell us your goal and whether or not you take pain or anxiety medications at home.          BEFORE YOU COME TO THE HOSPITAL  (Pre-op instructions)  • Do not eat, drink, smoke or chew gum after midnight the night before surgery.  This also includes no mints.  • Morning of surgery take only the medicines you have been instructed with a sip of water unless otherwise instructed  by your physician.  • Do not shave, wear makeup or dark nail polish.  • Remove all jewelry including rings.  • Leave anything you consider valuable at home.  • Leave your suitcase in the car until after your surgery.  • Bring the following with you if applicable:  o Picture ID and insurance, Medicare or Medicaid cards  o Co-pay/deductible required by insurance (cash, check, credit card)  o Copy of advance directive, living will or power-of- documents if not brought to PAT  o CPAP or BIPAP mask and tubing  o Relaxation aids (MP3 player, book, magazine)  • On the day of surgery check in at  registration located at the main entrance of the hospital.       Outpatient Surgery Guidelines, Adult  Outpatient procedures are those for which the person having the procedure is allowed to go home the same day as the procedure. Various procedures are done on an outpatient basis. You should follow some general guidelines if you will be having an outpatient procedure.  LET YOUR HEALTH CARE PROVIDER KNOW ABOUT:  · Any allergies you have.  · All medicines you are taking, including vitamins, herbs, eye drops, creams, and over-the-counter medicines.  · Previous problems you or members of your family have had with the use of anesthetics.  · Any blood disorders you have.  · Previous surgeries you have had.  · Medical conditions you have.  RISKS AND COMPLICATIONS  Your health care provider will discuss possible risks and complications with you before surgery. Common risks and complications include:    · Problems due to the use of anesthetics.  · Blood loss and replacement (does not apply to minor surgical procedures).  · Temporary increase in pain due to surgery.  · Uncorrected pain or problems that the surgery was meant to correct.  · Infection.  · New damage.  BEFORE THE PROCEDURE  · Ask your health care provider about changing or stopping your regular medicines. You may need to stop taking certain medicines in the days or weeks before the procedure.  · Stop smoking at least 2 weeks before surgery. This lowers your risk for complications during and after surgery. Ask your health care provider for help with this if needed.  · Eat your usual meals and a light supper the day before surgery. Continue fluid intake. Do not drink alcohol.  · Do not eat or drink after midnight the night before your surgery.   · Arrange for someone to take you home and to stay with you for 24 hours after the procedure. Medicine given for your procedure may affect your ability to drive or to care for yourself.  · Call your health care provider's  office if you develop an illness or problem that may prevent you from safely having your procedure.  AFTER THE PROCEDURE  After surgery, you will be taken to a recovery area, where your progress will be monitored. If there are no complications, you will be allowed to go home when you are awake, stable, and taking fluids well. You may have numbness around the surgical site. Healing will take some time. You will have tenderness at the surgical site and may have some swelling and bruising. You may also have some nausea.  HOME CARE INSTRUCTIONS  · Do not drive for 24 hours, or as directed by your health care provider. Do not drive while taking prescription pain medicines.  · Do not drink alcohol for 24 hours.  · Do not make important decisions or sign legal documents for 24 hours.  · You may resume a normal diet and activities as directed.  · Do not lift anything heavier than 10 pounds (4.5 kg) or play contact sports until your health care provider says it is okay.  · Change your bandages (dressings) as directed.  · Only take over-the-counter or prescription medicines as directed by your health care provider.  · Follow up with your health care provider as directed.  SEEK MEDICAL CARE IF:  · You have increased bleeding (more than a small spot) from the surgical site.  · You have redness, swelling, or increasing pain in the wound.  · You see pus coming from the wound.  · You have a fever.  · You notice a bad smell coming from the wound or dressing.  · You feel lightheaded or faint.  · You develop a rash.  · You have trouble breathing.  · You develop allergies.  MAKE SURE YOU:  · Understand these instructions.  · Will watch your condition.  · Will get help right away if you are not doing well or get worse.     This information is not intended to replace advice given to you by your health care provider. Make sure you discuss any questions you have with your health care provider.     Document Released: 09/12/2002 Document  Revised: 05/03/2016 Document Reviewed: 05/22/2014  [employer] Interactive Patient Education ©2016 [employer]       Fall Prevention in Hospitals, Adult  As a hospital patient, your condition and the treatments you receive can increase your risk for falls. Some additional risk factors for falls in a hospital include:  · Being in an unfamiliar environment.  · Being on bed rest.  · Your surgery.  · Taking certain medicines.  · Your tubing requirements, such as intravenous (IV) therapy or catheters.  It is important that you learn how to decrease fall risks while at the hospital. Below are important tips that can help prevent falls.  SAFETY TIPS FOR PREVENTING FALLS  Talk about your risk of falling.  · Ask your health care provider why you are at risk for falling. Is it your medicine, illness, tubing placement, or something else?  · Make a plan with your health care provider to keep you safe from falls.  · Ask your health care provider or pharmacist about side effects of your medicines. Some medicines can make you dizzy or affect your coordination.  Ask for help.  · Ask for help before getting out of bed. You may need to press your call button.  · Ask for assistance in getting safely to the toilet.  · Ask for a walker or cane to be put at your bedside. Ask that most of the side rails on your bed be placed up before your health care provider leaves the room.  · Ask family or friends to sit with you.  · Ask for things that are out of your reach, such as your glasses, hearing aids, telephone, bedside table, or call button.  Follow these tips to avoid falling:  · Stay lying or seated, rather than standing, while waiting for help.  · Wear rubber-soled slippers or shoes whenever you walk in the hospital.  · Avoid quick, sudden movements.  ¨ Change positions slowly.  ¨ Sit on the side of your bed before standing.  ¨ Stand up slowly and wait before you start to walk.  · Let your health care provider know if there is a spill on  the floor.  · Pay careful attention to the medical equipment, electrical cords, and tubes around you.  · When you need help, use your call button by your bed or in the bathroom. Wait for one of your health care providers to help you.  · If you feel dizzy or unsure of your footing, return to bed and wait for assistance.  · Avoid being distracted by the TV, telephone, or another person in your room.  · Do not lean or support yourself on rolling objects, such as IV poles or bedside tables.     This information is not intended to replace advice given to you by your health care provider. Make sure you discuss any questions you have with your health care provider.     Document Released: 12/15/2001 Document Revised: 01/08/2016 Document Reviewed: 08/25/2013  [employer] Interactive Patient Education ©2016 [employer]       Surgical Site Infections FAQs  What is a Surgical Site Infection (SSI)?  A surgical site infection is an infection that occurs after surgery in the part of the body where the surgery took place. Most patients who have surgery do not develop an infection. However, infections develop in about 1 to 3 out of every 100 patients who have surgery.  Some of the common symptoms of a surgical site infection are:  · Redness and pain around the area where you had surgery  · Drainage of cloudy fluid from your surgical wound  · Fever  Can SSIs be treated?  Yes. Most surgical site infections can be treated with antibiotics. The antibiotic given to you depends on the bacteria (germs) causing the infection. Sometimes patients with SSIs also need another surgery to treat the infection.  What are some of the things that hospitals are doing to prevent SSIs?  To prevent SSIs, doctors, nurses, and other healthcare providers:  · Clean their hands and arms up to their elbows with an antiseptic agent just before the surgery.  · Clean their hands with soap and water or an alcohol-based hand rub before and after caring for each  patient.  · May remove some of your hair immediately before your surgery using electric clippers if the hair is in the same area where the procedure will occur. They should not shave you with a razor.  · Wear special hair covers, masks, gowns, and gloves during surgery to keep the surgery area clean.  · Give you antibiotics before your surgery starts. In most cases, you should get antibiotics within 60 minutes before the surgery starts and the antibiotics should be stopped within 24 hours after surgery.  · Clean the skin at the site of your surgery with a special soap that kills germs.  What can I do to help prevent SSIs?  Before your surgery:  · Tell your doctor about other medical problems you may have. Health problems such as allergies, diabetes, and obesity could affect your surgery and your treatment.  · Quit smoking. Patients who smoke get more infections. Talk to your doctor about how you can quit before your surgery.  · Do not shave near where you will have surgery. Shaving with a razor can irritate your skin and make it easier to develop an infection.  At the time of your surgery:  · Speak up if someone tries to shave you with a razor before surgery. Ask why you need to be shaved and talk with your surgeon if you have any concerns.  · Ask if you will get antibiotics before surgery.  After your surgery:  · Make sure that your healthcare providers clean their hands before examining you, either with soap and water or an alcohol-based hand rub.  · If you do not see your providers clean their hands, please ask them to do so.  · Family and friends who visit you should not touch the surgical wound or dressings.  · Family and friends should clean their hands with soap and water or an alcohol-based hand rub before and after visiting you. If you do not see them clean their hands, ask them to clean their hands.  What do I need to do when I go home from the hospital?  · Before you go home, your doctor or nurse should  explain everything you need to know about taking care of your wound. Make sure you understand how to care for your wound before you leave the hospital.  · Always clean your hands before and after caring for your wound.  · Before you go home, make sure you know who to contact if you have questions or problems after you get home.  · If you have any symptoms of an infection, such as redness and pain at the surgery site, drainage, or fever, call your doctor immediately.  If you have additional questions, please ask your doctor or nurse.  Developed and co-sponsored by The Society for Healthcare Epidemiology of [place] (SHEA); Infectious Diseases Society of [place] (IDSA); American Hospital Association; Association for Professionals in Infection Control and Epidemiology (APIC); Centers for Disease Control and Prevention (CDC); and The Joint Commission.     This information is not intended to replace advice given to you by your health care provider. Make sure you discuss any questions you have with your health care provider.     Document Released: 12/23/2014 Document Revised: 01/08/2016 Document Reviewed: 03/02/2016  [employer] Interactive Patient Education ©2016 [employer].       [facility]  CHG 4% Patient Instruction Sheet    Preparing the Skin Before Surgery  Preparing or “prepping” skin before surgery can reduce the risk of infection at the surgical site. To make the process easier,[facility] has chosen 4% Chlorhexidine Gluconate (CHG) antiseptic solution.   The steps below outline the prepping process and should be carefully followed.                                                                                                                                                      Prep the skin at the following time(s):                                                      We recommend you shower the night before surgery, and again the morning of surgery with the 4% CHG antiseptic solution using half of the bottle  and a cloth each time.  Dress in clean clothes/sleepwear after showering.  See instructions below for application.          Do not apply any lotions or moisturizers.       Do not shave the area to be prepped for at least 2 days prior to surgery.    Clipping the hair may be done immediately prior to your surgery at the hospital    if needed.    Directions:  Thoroughly rinse your body with water.  Apply 4% CHG to a cloth and wash skin gently, paying special attention to the operative site.  Rinse again thoroughly.  Once you have begun using this product do not apply anything else to your skin. If itching or redness persists, rinse affected areas and discontinue use.    When using this product:  • Keep out of eyes, ears, and mouth.  • If solution should contact these areas, rinse out promptly and thoroughly with water.  • For external use only.  • Do not use in genital area, on your face or head.      PATIENT/FAMILY/RESPONSIBLE PARTY VERBALIZES UNDERSTANDING OF ABOVE EDUCATION.  COPY OF PAIN SCALE GIVEN AND REVIEWED WITH VERBALIZED UNDERSTANDING.

## 2018-11-12 ENCOUNTER — ANESTHESIA EVENT (OUTPATIENT)
Dept: PERIOP | Facility: HOSPITAL | Age: 73
End: 2018-11-12

## 2018-11-12 RX ORDER — SODIUM CHLORIDE 0.9 % (FLUSH) 0.9 %
3-10 SYRINGE (ML) INJECTION AS NEEDED
Status: DISCONTINUED | OUTPATIENT
Start: 2018-11-12 | End: 2018-11-13

## 2018-11-12 RX ORDER — SODIUM CHLORIDE 0.9 % (FLUSH) 0.9 %
3 SYRINGE (ML) INJECTION EVERY 12 HOURS SCHEDULED
Status: DISCONTINUED | OUTPATIENT
Start: 2018-11-12 | End: 2018-11-13

## 2018-11-12 RX ORDER — SODIUM CHLORIDE, SODIUM LACTATE, POTASSIUM CHLORIDE, CALCIUM CHLORIDE 600; 310; 30; 20 MG/100ML; MG/100ML; MG/100ML; MG/100ML
30 INJECTION, SOLUTION INTRAVENOUS CONTINUOUS
Status: DISCONTINUED | OUTPATIENT
Start: 2018-11-12 | End: 2018-11-13

## 2018-11-13 ENCOUNTER — TELEPHONE (OUTPATIENT)
Dept: UROLOGY | Facility: CLINIC | Age: 73
End: 2018-11-13

## 2018-11-13 ENCOUNTER — HOSPITAL ENCOUNTER (INPATIENT)
Facility: HOSPITAL | Age: 73
LOS: 2 days | Discharge: HOME | End: 2018-11-15
Attending: UROLOGY | Admitting: UROLOGY

## 2018-11-13 ENCOUNTER — ANESTHESIA (OUTPATIENT)
Dept: PERIOP | Facility: HOSPITAL | Age: 73
End: 2018-11-13

## 2018-11-13 DIAGNOSIS — C61 PROSTATE CANCER (HCC): ICD-10-CM

## 2018-11-13 LAB
ABO GROUP BLD: NORMAL
BLD GP AB SCN SERPL QL: NEGATIVE
HCT VFR BLD AUTO: 39 % (ref 40–52)
HGB BLD-MCNC: 13.5 G/DL (ref 14–18)
RH BLD: POSITIVE
T&S EXPIRATION DATE: NORMAL

## 2018-11-13 PROCEDURE — 07BC4ZX EXCISION OF PELVIS LYMPHATIC, PERCUTANEOUS ENDOSCOPIC APPROACH, DIAGNOSTIC: ICD-10-PCS | Performed by: UROLOGY

## 2018-11-13 PROCEDURE — 25010000002 PROPOFOL 10 MG/ML EMULSION: Performed by: NURSE ANESTHETIST, CERTIFIED REGISTERED

## 2018-11-13 PROCEDURE — 25010000002 ONDANSETRON PER 1 MG: Performed by: NURSE ANESTHETIST, CERTIFIED REGISTERED

## 2018-11-13 PROCEDURE — 25010000003 CEFAZOLIN PER 500 MG: Performed by: NURSE ANESTHETIST, CERTIFIED REGISTERED

## 2018-11-13 PROCEDURE — 55866 LAPS SURG PRST8ECT RPBIC RAD: CPT | Performed by: UROLOGY

## 2018-11-13 PROCEDURE — 38571 LAPAROSCOPY LYMPHADENECTOMY: CPT | Performed by: UROLOGY

## 2018-11-13 PROCEDURE — 86850 RBC ANTIBODY SCREEN: CPT | Performed by: ANESTHESIOLOGY

## 2018-11-13 PROCEDURE — 0VT04ZZ RESECTION OF PROSTATE, PERCUTANEOUS ENDOSCOPIC APPROACH: ICD-10-PCS | Performed by: UROLOGY

## 2018-11-13 PROCEDURE — 88307 TISSUE EXAM BY PATHOLOGIST: CPT | Performed by: UROLOGY

## 2018-11-13 PROCEDURE — 25010000002 MORPHINE PER 10 MG: Performed by: UROLOGY

## 2018-11-13 PROCEDURE — 94799 UNLISTED PULMONARY SVC/PX: CPT

## 2018-11-13 PROCEDURE — 25010000002 DEXAMETHASONE PER 1 MG: Performed by: NURSE ANESTHETIST, CERTIFIED REGISTERED

## 2018-11-13 PROCEDURE — 88309 TISSUE EXAM BY PATHOLOGIST: CPT | Performed by: UROLOGY

## 2018-11-13 PROCEDURE — 85018 HEMOGLOBIN: CPT | Performed by: UROLOGY

## 2018-11-13 PROCEDURE — 25010000002 CEFAZOLIN PER 500 MG: Performed by: UROLOGY

## 2018-11-13 PROCEDURE — 25010000002 KETOROLAC TROMETHAMINE PER 15 MG: Performed by: UROLOGY

## 2018-11-13 PROCEDURE — 25010000002 MIDAZOLAM PER 1 MG: Performed by: ANESTHESIOLOGY

## 2018-11-13 PROCEDURE — 8E0W4CZ ROBOTIC ASSISTED PROCEDURE OF TRUNK REGION, PERCUTANEOUS ENDOSCOPIC APPROACH: ICD-10-PCS | Performed by: UROLOGY

## 2018-11-13 PROCEDURE — 86900 BLOOD TYPING SEROLOGIC ABO: CPT | Performed by: ANESTHESIOLOGY

## 2018-11-13 PROCEDURE — 25010000002 DEXAMETHASONE PER 1 MG: Performed by: ANESTHESIOLOGY

## 2018-11-13 PROCEDURE — 86901 BLOOD TYPING SEROLOGIC RH(D): CPT | Performed by: ANESTHESIOLOGY

## 2018-11-13 PROCEDURE — S0260 H&P FOR SURGERY: HCPCS | Performed by: UROLOGY

## 2018-11-13 PROCEDURE — 85014 HEMATOCRIT: CPT | Performed by: UROLOGY

## 2018-11-13 DEVICE — CLIP LIG HEMOLOK PA LG 6CT PRP: Type: IMPLANTABLE DEVICE | Status: FUNCTIONAL

## 2018-11-13 RX ORDER — BUPIVACAINE HCL/0.9 % NACL/PF 0.1 %
2 PLASTIC BAG, INJECTION (ML) EPIDURAL EVERY 8 HOURS
Status: COMPLETED | OUTPATIENT
Start: 2018-11-13 | End: 2018-11-13

## 2018-11-13 RX ORDER — SODIUM CHLORIDE 0.9 % (FLUSH) 0.9 %
3 SYRINGE (ML) INJECTION EVERY 12 HOURS SCHEDULED
Status: DISCONTINUED | OUTPATIENT
Start: 2018-11-13 | End: 2018-11-13 | Stop reason: HOSPADM

## 2018-11-13 RX ORDER — DEXAMETHASONE SODIUM PHOSPHATE 4 MG/ML
INJECTION, SOLUTION INTRA-ARTICULAR; INTRALESIONAL; INTRAMUSCULAR; INTRAVENOUS; SOFT TISSUE AS NEEDED
Status: DISCONTINUED | OUTPATIENT
Start: 2018-11-13 | End: 2018-11-13 | Stop reason: SURG

## 2018-11-13 RX ORDER — DEXAMETHASONE SODIUM PHOSPHATE 4 MG/ML
4 INJECTION, SOLUTION INTRA-ARTICULAR; INTRALESIONAL; INTRAMUSCULAR; INTRAVENOUS; SOFT TISSUE ONCE AS NEEDED
Status: COMPLETED | OUTPATIENT
Start: 2018-11-13 | End: 2018-11-13

## 2018-11-13 RX ORDER — MORPHINE SULFATE 2 MG/ML
2 INJECTION, SOLUTION INTRAMUSCULAR; INTRAVENOUS
Status: DISCONTINUED | OUTPATIENT
Start: 2018-11-13 | End: 2018-11-13 | Stop reason: HOSPADM

## 2018-11-13 RX ORDER — HYDROCODONE BITARTRATE AND ACETAMINOPHEN 7.5; 325 MG/1; MG/1
2 TABLET ORAL EVERY 4 HOURS PRN
Status: DISCONTINUED | OUTPATIENT
Start: 2018-11-13 | End: 2018-11-15 | Stop reason: HOSPADM

## 2018-11-13 RX ORDER — PROPOFOL 10 MG/ML
VIAL (ML) INTRAVENOUS AS NEEDED
Status: DISCONTINUED | OUTPATIENT
Start: 2018-11-13 | End: 2018-11-13 | Stop reason: SURG

## 2018-11-13 RX ORDER — ROSUVASTATIN CALCIUM 10 MG/1
10 TABLET, COATED ORAL DAILY
Status: DISCONTINUED | OUTPATIENT
Start: 2018-11-13 | End: 2018-11-15 | Stop reason: HOSPADM

## 2018-11-13 RX ORDER — NALOXONE HCL 0.4 MG/ML
0.04 VIAL (ML) INJECTION AS NEEDED
Status: DISCONTINUED | OUTPATIENT
Start: 2018-11-13 | End: 2018-11-13 | Stop reason: HOSPADM

## 2018-11-13 RX ORDER — SODIUM CHLORIDE 9 MG/ML
INJECTION, SOLUTION INTRAVENOUS AS NEEDED
Status: DISCONTINUED | OUTPATIENT
Start: 2018-11-13 | End: 2018-11-13 | Stop reason: HOSPADM

## 2018-11-13 RX ORDER — SUFENTANIL CITRATE 50 UG/ML
INJECTION EPIDURAL; INTRAVENOUS AS NEEDED
Status: DISCONTINUED | OUTPATIENT
Start: 2018-11-13 | End: 2018-11-13 | Stop reason: SURG

## 2018-11-13 RX ORDER — MIDAZOLAM HYDROCHLORIDE 1 MG/ML
1 INJECTION INTRAMUSCULAR; INTRAVENOUS
Status: DISCONTINUED | OUTPATIENT
Start: 2018-11-13 | End: 2018-11-13 | Stop reason: HOSPADM

## 2018-11-13 RX ORDER — SODIUM CHLORIDE, SODIUM LACTATE, POTASSIUM CHLORIDE, CALCIUM CHLORIDE 600; 310; 30; 20 MG/100ML; MG/100ML; MG/100ML; MG/100ML
100 INJECTION, SOLUTION INTRAVENOUS CONTINUOUS
Status: DISCONTINUED | OUTPATIENT
Start: 2018-11-13 | End: 2018-11-13

## 2018-11-13 RX ORDER — LIDOCAINE HYDROCHLORIDE 40 MG/ML
SOLUTION TOPICAL AS NEEDED
Status: DISCONTINUED | OUTPATIENT
Start: 2018-11-13 | End: 2018-11-13 | Stop reason: SURG

## 2018-11-13 RX ORDER — OXYCODONE AND ACETAMINOPHEN 10; 325 MG/1; MG/1
1 TABLET ORAL ONCE AS NEEDED
Status: COMPLETED | OUTPATIENT
Start: 2018-11-13 | End: 2018-11-13

## 2018-11-13 RX ORDER — ONDANSETRON 2 MG/ML
INJECTION INTRAMUSCULAR; INTRAVENOUS AS NEEDED
Status: DISCONTINUED | OUTPATIENT
Start: 2018-11-13 | End: 2018-11-13 | Stop reason: SURG

## 2018-11-13 RX ORDER — ONDANSETRON 2 MG/ML
4 INJECTION INTRAMUSCULAR; INTRAVENOUS AS NEEDED
Status: DISCONTINUED | OUTPATIENT
Start: 2018-11-13 | End: 2018-11-13 | Stop reason: HOSPADM

## 2018-11-13 RX ORDER — MAGNESIUM HYDROXIDE 1200 MG/15ML
LIQUID ORAL AS NEEDED
Status: DISCONTINUED | OUTPATIENT
Start: 2018-11-13 | End: 2018-11-13 | Stop reason: HOSPADM

## 2018-11-13 RX ORDER — SODIUM CHLORIDE 9 MG/ML
150 INJECTION, SOLUTION INTRAVENOUS CONTINUOUS
Status: DISCONTINUED | OUTPATIENT
Start: 2018-11-13 | End: 2018-11-15

## 2018-11-13 RX ORDER — CETIRIZINE HYDROCHLORIDE 10 MG/1
10 TABLET ORAL DAILY
Status: DISCONTINUED | OUTPATIENT
Start: 2018-11-13 | End: 2018-11-15 | Stop reason: HOSPADM

## 2018-11-13 RX ORDER — SODIUM CHLORIDE 0.9 % (FLUSH) 0.9 %
3 SYRINGE (ML) INJECTION AS NEEDED
Status: DISCONTINUED | OUTPATIENT
Start: 2018-11-13 | End: 2018-11-13 | Stop reason: HOSPADM

## 2018-11-13 RX ORDER — LIDOCAINE HYDROCHLORIDE 20 MG/ML
INJECTION, SOLUTION INFILTRATION; PERINEURAL AS NEEDED
Status: DISCONTINUED | OUTPATIENT
Start: 2018-11-13 | End: 2018-11-13 | Stop reason: SURG

## 2018-11-13 RX ORDER — SODIUM CHLORIDE 0.9 % (FLUSH) 0.9 %
1-10 SYRINGE (ML) INJECTION AS NEEDED
Status: DISCONTINUED | OUTPATIENT
Start: 2018-11-13 | End: 2018-11-13 | Stop reason: HOSPADM

## 2018-11-13 RX ORDER — IPRATROPIUM BROMIDE AND ALBUTEROL SULFATE 2.5; .5 MG/3ML; MG/3ML
3 SOLUTION RESPIRATORY (INHALATION) ONCE AS NEEDED
Status: DISCONTINUED | OUTPATIENT
Start: 2018-11-13 | End: 2018-11-13 | Stop reason: HOSPADM

## 2018-11-13 RX ORDER — CEFAZOLIN SODIUM 1 G/3ML
INJECTION, POWDER, FOR SOLUTION INTRAMUSCULAR; INTRAVENOUS AS NEEDED
Status: DISCONTINUED | OUTPATIENT
Start: 2018-11-13 | End: 2018-11-13 | Stop reason: SURG

## 2018-11-13 RX ORDER — ACETAMINOPHEN 500 MG
1000 TABLET ORAL ONCE
Status: COMPLETED | OUTPATIENT
Start: 2018-11-13 | End: 2018-11-13

## 2018-11-13 RX ORDER — HYDRALAZINE HYDROCHLORIDE 20 MG/ML
5 INJECTION INTRAMUSCULAR; INTRAVENOUS
Status: DISCONTINUED | OUTPATIENT
Start: 2018-11-13 | End: 2018-11-13 | Stop reason: HOSPADM

## 2018-11-13 RX ORDER — MEPERIDINE HYDROCHLORIDE 25 MG/ML
12.5 INJECTION INTRAMUSCULAR; INTRAVENOUS; SUBCUTANEOUS
Status: DISCONTINUED | OUTPATIENT
Start: 2018-11-13 | End: 2018-11-13 | Stop reason: HOSPADM

## 2018-11-13 RX ORDER — BUPIVACAINE HCL/0.9 % NACL/PF 0.1 %
2 PLASTIC BAG, INJECTION (ML) EPIDURAL ONCE
Status: COMPLETED | OUTPATIENT
Start: 2018-11-13 | End: 2018-11-13

## 2018-11-13 RX ORDER — HYDROCHLOROTHIAZIDE 25 MG/1
25 TABLET ORAL DAILY
Status: DISCONTINUED | OUTPATIENT
Start: 2018-11-13 | End: 2018-11-13

## 2018-11-13 RX ORDER — KETOROLAC TROMETHAMINE 15 MG/ML
15 INJECTION, SOLUTION INTRAMUSCULAR; INTRAVENOUS EVERY 6 HOURS SCHEDULED
Status: COMPLETED | OUTPATIENT
Start: 2018-11-13 | End: 2018-11-14

## 2018-11-13 RX ORDER — HYDROCODONE BITARTRATE AND ACETAMINOPHEN 7.5; 325 MG/1; MG/1
1 TABLET ORAL EVERY 4 HOURS PRN
Status: DISCONTINUED | OUTPATIENT
Start: 2018-11-14 | End: 2018-11-15 | Stop reason: HOSPADM

## 2018-11-13 RX ORDER — ONDANSETRON 4 MG/1
4 TABLET, ORALLY DISINTEGRATING ORAL EVERY 6 HOURS PRN
Status: DISCONTINUED | OUTPATIENT
Start: 2018-11-13 | End: 2018-11-15 | Stop reason: HOSPADM

## 2018-11-13 RX ORDER — ONDANSETRON 2 MG/ML
4 INJECTION INTRAMUSCULAR; INTRAVENOUS EVERY 6 HOURS PRN
Status: DISCONTINUED | OUTPATIENT
Start: 2018-11-13 | End: 2018-11-15 | Stop reason: HOSPADM

## 2018-11-13 RX ORDER — ROCURONIUM BROMIDE 10 MG/ML
INJECTION, SOLUTION INTRAVENOUS AS NEEDED
Status: DISCONTINUED | OUTPATIENT
Start: 2018-11-13 | End: 2018-11-13 | Stop reason: SURG

## 2018-11-13 RX ORDER — SODIUM CHLORIDE, SODIUM LACTATE, POTASSIUM CHLORIDE, CALCIUM CHLORIDE 600; 310; 30; 20 MG/100ML; MG/100ML; MG/100ML; MG/100ML
1000 INJECTION, SOLUTION INTRAVENOUS CONTINUOUS
Status: DISCONTINUED | OUTPATIENT
Start: 2018-11-13 | End: 2018-11-13

## 2018-11-13 RX ORDER — FENTANYL CITRATE 50 UG/ML
25 INJECTION, SOLUTION INTRAMUSCULAR; INTRAVENOUS AS NEEDED
Status: DISCONTINUED | OUTPATIENT
Start: 2018-11-13 | End: 2018-11-13 | Stop reason: HOSPADM

## 2018-11-13 RX ORDER — MORPHINE SULFATE 10 MG/ML
6 INJECTION INTRAMUSCULAR; INTRAVENOUS; SUBCUTANEOUS
Status: DISCONTINUED | OUTPATIENT
Start: 2018-11-13 | End: 2018-11-14

## 2018-11-13 RX ORDER — ONDANSETRON 4 MG/1
4 TABLET, FILM COATED ORAL EVERY 6 HOURS PRN
Status: DISCONTINUED | OUTPATIENT
Start: 2018-11-13 | End: 2018-11-15 | Stop reason: HOSPADM

## 2018-11-13 RX ORDER — FLUMAZENIL 0.1 MG/ML
0.2 INJECTION INTRAVENOUS AS NEEDED
Status: DISCONTINUED | OUTPATIENT
Start: 2018-11-13 | End: 2018-11-13 | Stop reason: HOSPADM

## 2018-11-13 RX ORDER — METOCLOPRAMIDE HYDROCHLORIDE 5 MG/ML
5 INJECTION INTRAMUSCULAR; INTRAVENOUS
Status: DISCONTINUED | OUTPATIENT
Start: 2018-11-13 | End: 2018-11-13 | Stop reason: HOSPADM

## 2018-11-13 RX ORDER — MIDAZOLAM HYDROCHLORIDE 1 MG/ML
2 INJECTION INTRAMUSCULAR; INTRAVENOUS
Status: DISCONTINUED | OUTPATIENT
Start: 2018-11-13 | End: 2018-11-13 | Stop reason: HOSPADM

## 2018-11-13 RX ORDER — NALOXONE HCL 0.4 MG/ML
0.4 VIAL (ML) INJECTION
Status: DISCONTINUED | OUTPATIENT
Start: 2018-11-13 | End: 2018-11-15 | Stop reason: HOSPADM

## 2018-11-13 RX ORDER — LABETALOL HYDROCHLORIDE 5 MG/ML
5 INJECTION, SOLUTION INTRAVENOUS
Status: DISCONTINUED | OUTPATIENT
Start: 2018-11-13 | End: 2018-11-13 | Stop reason: HOSPADM

## 2018-11-13 RX ADMIN — HYDROCHLOROTHIAZIDE 25 MG: 25 TABLET ORAL at 13:44

## 2018-11-13 RX ADMIN — SODIUM CHLORIDE 150 ML/HR: 9 INJECTION, SOLUTION INTRAVENOUS at 21:07

## 2018-11-13 RX ADMIN — PROPOFOL 100 MG: 10 INJECTION, EMULSION INTRAVENOUS at 11:44

## 2018-11-13 RX ADMIN — SODIUM CHLORIDE, POTASSIUM CHLORIDE, SODIUM LACTATE AND CALCIUM CHLORIDE 1000 ML: 600; 310; 30; 20 INJECTION, SOLUTION INTRAVENOUS at 05:52

## 2018-11-13 RX ADMIN — DEXAMETHASONE SODIUM PHOSPHATE 4 MG: 4 INJECTION, SOLUTION INTRA-ARTICULAR; INTRALESIONAL; INTRAMUSCULAR; INTRAVENOUS; SOFT TISSUE at 07:37

## 2018-11-13 RX ADMIN — SUFENTANIL CITRATE 10 MCG: 50 INJECTION, SOLUTION EPIDURAL; INTRAVENOUS at 10:31

## 2018-11-13 RX ADMIN — OXYCODONE HYDROCHLORIDE AND ACETAMINOPHEN 1 TABLET: 10; 325 TABLET ORAL at 12:35

## 2018-11-13 RX ADMIN — KETOROLAC TROMETHAMINE 15 MG: 15 INJECTION, SOLUTION INTRAMUSCULAR; INTRAVENOUS at 22:55

## 2018-11-13 RX ADMIN — ROCURONIUM BROMIDE 30 MG: 10 INJECTION INTRAVENOUS at 08:12

## 2018-11-13 RX ADMIN — PROPOFOL 150 MG: 10 INJECTION, EMULSION INTRAVENOUS at 08:12

## 2018-11-13 RX ADMIN — KETOROLAC TROMETHAMINE 15 MG: 15 INJECTION, SOLUTION INTRAMUSCULAR; INTRAVENOUS at 13:05

## 2018-11-13 RX ADMIN — ONDANSETRON HYDROCHLORIDE 4 MG: 2 SOLUTION INTRAMUSCULAR; INTRAVENOUS at 11:05

## 2018-11-13 RX ADMIN — LIDOCAINE HYDROCHLORIDE 0.5 ML: 10 INJECTION, SOLUTION EPIDURAL; INFILTRATION; INTRACAUDAL; PERINEURAL at 05:50

## 2018-11-13 RX ADMIN — SODIUM CHLORIDE 150 ML/HR: 9 INJECTION, SOLUTION INTRAVENOUS at 13:05

## 2018-11-13 RX ADMIN — SUFENTANIL CITRATE 10 MCG: 50 INJECTION, SOLUTION EPIDURAL; INTRAVENOUS at 08:10

## 2018-11-13 RX ADMIN — ROCURONIUM BROMIDE 20 MG: 10 INJECTION INTRAVENOUS at 08:16

## 2018-11-13 RX ADMIN — SUFENTANIL CITRATE 20 MCG: 50 INJECTION, SOLUTION EPIDURAL; INTRAVENOUS at 09:01

## 2018-11-13 RX ADMIN — ACETAMINOPHEN 1000 MG: 500 TABLET ORAL at 07:37

## 2018-11-13 RX ADMIN — SODIUM CHLORIDE, POTASSIUM CHLORIDE, SODIUM LACTATE AND CALCIUM CHLORIDE: 600; 310; 30; 20 INJECTION, SOLUTION INTRAVENOUS at 11:24

## 2018-11-13 RX ADMIN — ROSUVASTATIN CALCIUM 10 MG: 10 TABLET, FILM COATED ORAL at 13:45

## 2018-11-13 RX ADMIN — ROCURONIUM BROMIDE 20 MG: 10 INJECTION INTRAVENOUS at 10:10

## 2018-11-13 RX ADMIN — PROPOFOL 50 MG: 10 INJECTION, EMULSION INTRAVENOUS at 08:13

## 2018-11-13 RX ADMIN — CEFAZOLIN 2 G: 10 INJECTION, POWDER, FOR SOLUTION INTRAVENOUS; PARENTERAL at 16:08

## 2018-11-13 RX ADMIN — CETIRIZINE HYDROCHLORIDE 10 MG: 10 TABLET ORAL at 13:45

## 2018-11-13 RX ADMIN — KETOROLAC TROMETHAMINE 15 MG: 15 INJECTION, SOLUTION INTRAMUSCULAR; INTRAVENOUS at 17:30

## 2018-11-13 RX ADMIN — MORPHINE SULFATE 6 MG: 10 INJECTION INTRAVENOUS at 13:45

## 2018-11-13 RX ADMIN — CEFAZOLIN 2 G: 1 INJECTION, POWDER, FOR SOLUTION INTRAVENOUS at 08:20

## 2018-11-13 RX ADMIN — LIDOCAINE HYDROCHLORIDE 1 EACH: 40 SOLUTION TOPICAL at 08:15

## 2018-11-13 RX ADMIN — SUFENTANIL CITRATE 10 MCG: 50 INJECTION, SOLUTION EPIDURAL; INTRAVENOUS at 08:12

## 2018-11-13 RX ADMIN — MIDAZOLAM HYDROCHLORIDE 2 MG: 1 INJECTION, SOLUTION INTRAMUSCULAR; INTRAVENOUS at 07:37

## 2018-11-13 RX ADMIN — PROPOFOL 70 MG: 10 INJECTION, EMULSION INTRAVENOUS at 08:46

## 2018-11-13 RX ADMIN — CEFAZOLIN 2 G: 10 INJECTION, POWDER, FOR SOLUTION INTRAVENOUS; PARENTERAL at 22:52

## 2018-11-13 RX ADMIN — CEFAZOLIN 2 G: 10 INJECTION, POWDER, FOR SOLUTION INTRAVENOUS; PARENTERAL at 07:36

## 2018-11-13 RX ADMIN — LIDOCAINE HYDROCHLORIDE 100 MG: 20 INJECTION, SOLUTION INFILTRATION; PERINEURAL at 08:12

## 2018-11-13 RX ADMIN — DEXAMETHASONE SODIUM PHOSPHATE 8 MG: 4 INJECTION, SOLUTION INTRAMUSCULAR; INTRAVENOUS at 11:05

## 2018-11-13 RX ADMIN — SODIUM CHLORIDE, POTASSIUM CHLORIDE, SODIUM LACTATE AND CALCIUM CHLORIDE 30 ML/HR: 600; 310; 30; 20 INJECTION, SOLUTION INTRAVENOUS at 05:55

## 2018-11-13 RX ADMIN — SUFENTANIL CITRATE 10 MCG: 50 INJECTION, SOLUTION EPIDURAL; INTRAVENOUS at 08:56

## 2018-11-13 RX ADMIN — ROCURONIUM BROMIDE 30 MG: 10 INJECTION INTRAVENOUS at 09:23

## 2018-11-13 NOTE — ANESTHESIA PREPROCEDURE EVALUATION
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  Cardiovascular - normal exam  Exercise tolerance: good (4-7 METS)    ECG reviewed  Rhythm: regular  Rate: normal    (+) hypertension well controlled, hyperlipidemia,   (-) pacemaker, past MI, angina, cardiac stents      Neuro/Psych  (-) seizures, TIA, CVA  GI/Hepatic/Renal/Endo    (-) GERD, liver disease, no renal disease, diabetes, hypothyroidism, hyperthyroidism    Musculoskeletal     Abdominal    Substance History      OB/GYN          Other      history of cancer (prostate)                      Anesthesia Plan    ASA 2     general     intravenous induction   Anesthetic plan, all risks, benefits, and alternatives have been provided, discussed and informed consent has been obtained with: patient.

## 2018-11-13 NOTE — H&P
Patient Care Team:  Ozzie Feliz MD as PCP - General (Internal Medicine)  Rosales, LUZ MARIA Gramajo as Referring Physician (Family Medicine)  Juanito Bailey MD as Consulting Physician (Otolaryngology)    Chief complaint Presents for prostatectomy    Subjective     He is here today to undergo treatment for his newly diagnosed prostate cancer.  His biopsy was done 6  week(s) ago. This was done in the context of Elevated PSA. Severity best described as adenocarcinoma in 2/15 cores with the maximum mati grade of 3+4. The patient did not need imaging based on risk stratification. . Symptoms include none.  The patients potency status can be defined as reasonable erections on most attempts. He has undergone previous upper abdominal surgery with hernia repair.         Review of Systems   Constitutional: Negative for chills and fever.   Gastrointestinal: Negative for abdominal pain, anal bleeding and blood in stool.   Genitourinary: Negative for dysuria and hematuria.        Past Medical History:   Diagnosis Date   • Arthritis    • Colon polyps    • High cholesterol    • Hypertension    • Mycobacterium avium complex (CMS/HCC)    • PSA elevation    • Seasonal allergies      Past Surgical History:   Procedure Laterality Date   • COLONOSCOPY     • HERNIA REPAIR      abdominal   • PROSTATE ULTRASOUND BIOPSY  12/2016    negative   • ROTATOR CUFF REPAIR     • TONSILLECTOMY       Family History   Problem Relation Age of Onset   • No Known Problems Father    • No Known Problems Mother      Social History     Tobacco Use   • Smoking status: Never Smoker   • Smokeless tobacco: Never Used   Substance Use Topics   • Alcohol use: Yes     Comment: OCC   • Drug use: No     Medications Prior to Admission   Medication Sig Dispense Refill Last Dose   • coenzyme Q10 100 MG capsule Take 100 mg by mouth Daily.   11/12/2018 at 0800   • fexofenadine (ALLEGRA) 180 MG tablet Take 180 mg by mouth Daily.   11/12/2018 at 0800    • hydrochlorothiazide (HYDRODIURIL) 25 MG tablet Take 25 mg by mouth Daily.   11/12/2018 at 0800   • irbesartan (AVAPRO) 300 MG tablet Take 300 mg by mouth Daily.   11/12/2018 at 0800   • Multiple Vitamins-Minerals (VISION-KATARINA PRESERVE PO) Take 1 tablet by mouth Daily.   11/12/2018 at 0800   • rosuvastatin (CRESTOR) 10 MG tablet Take 10 mg by mouth Daily.   11/12/2018 at 0800   • sildenafil (VIAGRA) 100 MG tablet Take 100 mg by mouth Daily As Needed for erectile dysfunction.   Past Month at Unknown time   • aspirin 81 MG EC tablet Take 81 mg by mouth Daily.   11/6/2018   • azithromycin (ZITHROMAX) 500 MG tablet Take 500 mg by mouth 3 (Three) Times a Week.   11/9/2018   • ethambutol (MYAMBUTOL) 400 MG tablet Take 400 mg by mouth 3 (Three) Times a Week.   11/9/2018   • rifAMPin (RIFADIN) 300 MG capsule Take 300 mg by mouth 3 (Three) Times a Week.   11/9/2018     Allergies:  Patient has no known allergies.    Objective      Vital Signs  Temp:  [97.3 °F (36.3 °C)] 97.3 °F (36.3 °C)  Heart Rate:  [76] 76  Resp:  [18] 18  BP: (151)/(76) 151/76    Physical Exam  Constitutional:  They  appear well-developed and well-nourished. There are no obvious deformities. No distress.   Pulmonary/Chest: Effort normal.   GI: Soft. The patient exhibits no distension and no mass. There is no tenderness. There is no rebound and no guarding. No hernia.   Neurological: Patient is alert and oriented to person, place, and time.   Skin: Skin is warm and dry. Not diaphoretic.   Psychiatric:  normal mood and affect. Not agitated.       Results Review:   I reviewed the patient's new clinical results.      Assessment/Plan       Prostate cancer (CMS/HCC)      Assessment & Plan  Plan robot prostatectomy. The risks, alternatives, and benefits of this treatment recommendation were discussed at his office visit.  I did answer all questions of the patient.    I discussed the patients findings and my recommendations with patient    Chemo Tierney,  MD  11/13/18  6:55 AM

## 2018-11-13 NOTE — OP NOTE
Operative Summary    Karan Pidera  Date of Procedure: 11/13/2018    Pre-op Diagnosis:   Prostate cancer (CMS/HCC) [C61]    Post-op Diagnosis:     Post-Op Diagnosis Codes:     * Prostate cancer (CMS/HCC) [C61]    Procedure/CPT® Codes:      Procedure(s):  PROSTATECTOMY LAPAROSCOPIC WITH DAVINCI SI ROBOT WITH PELVIC LYMPH NODE DISSECTION    Surgeon(s):  Chemo Tierney MD    Anesthesia: General    Staff:   Circulator: Angi Cr RN; Karsten Spencer RN  Scrub Person: Jolynn Jorgensen; Tariq Carreon  Assistant: Naomi Yeboah    Indications for procedure:  Intermediate risk prostate cancer identified on biopsy    Findings:   #1.  No obvious evidence of extra prostatic disease.  #2.  Lymph node dissection shows no evidence of bulky adenopathy.  #3.  Moderate sigmoid diverticulosis with adhesions to the lateral abdominal wall and iliac vessels  #4.  Previous abdominal hernia repair mesh is noted and I was able to place the midline trocar 2 fingerbreadths inferior to the most inferior edge of the mesh at the midline.    Procedure details:  The patient is identified in the preoperative holding area.  The informed consent process had been completed In the office documented by my last progress note that included a discussion of the risks of this procedure, alternative management options, and the potential benefits of this procedure.  The patient voiced a good recollection of that discussion.  The patient voiced no additional questions.     The patient is taken to the operating room and given effective general anesthesia. The patient is then placed in the low dorsal lithotomy position with care being placed to appropriately pad the patient to avoid excessive compression with the Usman stirrups, especially laterally to the leg on the peroneal nerve.  The patient is then prepped and draped in the usual sterile fashion. A sterile 18FR renee catheter is placed at this point.  Appropriate timeout  protocol was observed.    The anterior portion abdominal walls examined closely.  The previous upper abdominal midline incision is noted from the ventral hernia repair..  An 11 blade is used to make a small incision in the left upper quadrant..  A Veress needle was used and passed gently into the abdomen.  Definitive pops to the midline fascia and peritoneum were felt.  At this point CO2 insufflation was started.  I started out with low flow and observe the pressures closely.  A slow increase in the abdominal pressure was noted.  Once examination noted the abdomen was distending appropriately, I increased the patient the high flow.  Pressure maximum was set on 15.  Once this was observed the Veress needle was removed.  At this point a 5 mm trocar was placed in the left midclavicular area which would eventually be changed to an 8.5 Frisian trocar.  I was able to inspect the previous hernia site with the 30° 5 mm laparoscope.   The intra-abdominal contents are examined.  There was no evidence of trauma from the Veress needle. The sigmoid colon as well as the distal ileum and cecum showed no evidence of an obvious mass.  However the sigmoid colon was noted to be adhesed and laterally displaced over the iliac vessels and anterior lateral abdominal wall.  I was also able to inspect the mesh and previous hernia repair and the plan was originally to place the camera port in the middle of the mesh but this was too cephalad to be able to reach the pelvis with the camera so I was able to go to fingerbreadths below were the mesh was located making a periumbilical incision for the camera port.  This was made next and a 15 mm bariatric port was placed just above the umbilicus.  I was able to directly visualize this.  At this point I switched the gas over to that port and changed out the 5 mm port as described above.    The anterior abdominal wall is inspected.  I used the standard trocar placements for robot prostatectomy.   Initially I placed an 8.5 [place] da Emeterio trocar site about 2 fingerbreadths cephalad and medial to the left anterior superior iliac spine.  Arm #3 of the robot would be attached to this trocar Then 1 handbreadth from the camera port, which was near the left midclavicular line, I placed another 8.5 [place] da Emeterio trocar.  Arm #2 of the robot would be attached to this trocar.  These were both placed under direct vision.  There was no evidence of excessive bleeding.    I then turned my attention to the right lower quadrant.  Again under direct vision and 1 handbreadth from the camera port near the right midclavicular line, I placed another 8.5 [place] da Emeterio trocar.  Arm #3 of the robot would be attached to this trocar.  Then in the right lower quadrant approximately 2 fingerbreadths medial and cephalad to the anterior superior iliac spine, I placed a 12 mm air seal port.  CO2 insufflation at this point was changed to the air seal system which allowed constant insufflation throughout the case.  Pressure would be limited to 15 throughout the entire case.  At this point I placed 2 separate 1 Vicryl sutures using the Jameel Piedra needle to prevent hernia.    Lastly in the right upper quadrant, approximately 1 handbreadth cephalad to the camera port and midway between the right midclavicular line port and the camera port, I placed a 5 mm trocar for the assistant to use throughout the case.    With all trochars placed in the standard fashion and without evidence of trauma, the patient is placed in the Trendelenburg position at this point the da Emeterio SI robot is docked in the standard fashion.    The initial step was to take down the adhesions where the sigmoid colon was stuck to the anterior lateral aspect of the abdominal wall.  I mobilized the sigmoid colon medially and reflected the peritoneum.  Obviously the mesentery of the sigmoid colon was preserved.  Once this was mobilized I took down an anterior strip of  the peritoneum lateral to the median umbilical ligaments on both sides and connect these just below the umbilicus.  As I took this plane down I could identify the inferior aspect of the pubic bone which was the initial landmark.  On both sides the posterior peritoneal incision is carried around laterally towards the vas deferens.  This exposed the obturator becky package.  On both sides at that posterior perineal incision is carried along the external iliac artery until the common artery iliac is identified.  The ureter is identified but not exposed on both sides.    Bilateral pelvic lymphadenectomy is performed at this point.  No gross adenopathy was noted.  Instruments used was a monopolar scissor and a [employer] bipolar dissecting instrument.  A Cobra was used with arms 3 throughout the procedure for ease of grasping and manipulation of the tissue.  On both sides the margins of the dissection were the external iliac artery, the common iliac artery, the ureter, the lateral bladder wall, the obturator nerve, and the take off of the lateral circumflex vein.  Both becky packages were sent together for inspection.  Absence of gross adenopathy made me decide that we were going to remove the prostate so I did not do a frozen section.    Attention was turned to removing the prostate gland.  Fat is removed off the anterior surface of the prostate.  The endopelvic fascia is then opened laterally on both sides.  The levator ani fibers are pushed away laterally to define the lateral aspect of the prostate.  The bladder neck is then identified by manipulating the catheter, compressing the lateral aspect of the bladder with these and then noting the prostate gland was not compressible, and looking for the slight angle created by the base of the prostate and the bladder.  At this point I began to cut down on the area anteriorly that I felt defined the base of the prostate from the bladder neck.  Circular fibers of the detrusor  muscle are noted.  A relatively bloodless plane is also identified to make me feel comfortable I was in this area.  This dissection is carried around laterally so that the bladder mucosa and the most proximal aspect of the urethral mucosa is opened well away from the trigone.  The posterior aspect of the urethra and bladder neck were then identified.  The balloon is deflated and the tip of the catheter is pulled into the posterior urethra.    Once I was through the posterior lip of the bladder neck, I could see the fibers of Denonivillier's fascia as well as fat that surrounds the vas deferens and seminal vesicles.  I initially dissected out the right vas deferens to the level of what I felt would be the tip of the seminal vesicles.  The identical maneuver was used on the contralateral side.  I felt it I was away from the nerve bundle enough to comfortably use cautery in this area.  I then dissected out the seminal vesicles doing the right side prior to the left.  There was no obvious evidence of metastatic disease to the seminal vesicles.  I dissected the medial aspect initially and then freed the lateral aspect from what was felt to be the neurovascular bundle.  Care was taken to avoid the use of electrocautery from about the midportion of the seminal vesicle all the way to the tip.  I did use bipolar cautery to cauterize the artery at the very tip of the seminal vesicle holding it medially away from the remainder of the neurovascular bundle.  The lateral most attachments of the bladder to the prostate are then taken down with the vessel sealer.  This created a nice plane as well as good hemostasis.  The anteriormost aspect of the neurovascular bundle was now identified.  At this point the base of the prostate, and the seminal vesicles were free.    I then turned my attention to the posterior aspect of the prostate gland.  Caudal to the seminal vesicles and vas deferens which were now retracted anteriorly I  developed this plane between the prerectal fat and  Denonvilliers' fascia.  fascia.  I did this in the midline initially and then swept this laterally with a combination of both sharp and blunt dissection with the instruments.  I was able to free the prostate down very near the apex.  There was no obvious evidence of extracapsular involvement nor invasion to the prerectal fat.    Now the prostate free at its base and posterior aspect, I turned my attention to doing a nerve sparing prostatectomy.  I used the same technique on the left as the right to do a bilateral nerve sparing approach as follows: The lateral prostatic fascia is divided about midway up on the prostate as defined by the lateral coursing vein that goes all the way to the apex of the prostate.  I identified the plane between the lateral wall the prostate and the neurovascular bundle at about the mid level of the prostate.  This is dissected both apically and towards the base of the prostate.  At the base of the prostate was felt to be the largest portion of the blood supply to the gland.  Where the blood supply coursed anterior laterally to the prostate, I had dissected this free so that I could insert the vessel sealer and divided this neurovascular tissue.  I used scissors without cautery to dissected the lateral aspect of the prostate all the way down apically.  Any obvious vessel but needed to be controlled at this point was divided after a titanium clip was applied.    With the prostate now free at its base, posteriorly, and laterally, I turned my attention to the apex of the prostate.  The levator fibers were swept away from the apex.  The puboprostatic ligaments are identified and divided using the electrocautery scissors.  The plane between the dorsal vein and the anterior aspect of the urethra is developed.  Care was taken to avoid entering the apical tissue of the prostate posteriorly.  I then placed 1 Vicryl suture around the dorsal vein to  prevent backbleeding.  The dorsal vein at this point is divided.  I used scissors to remove the anterior attachments of the endopelvic fascia and left later fibers from the apex of the prostate.  The urethra circumferentially dissected out and divided anteriorly.  The catheters pulled back into the bulbar urethra at this point.  The posterior lip urethra was then divided.  Slight retraction is noted but the mucosa was still visible.  The final attachments from the posterior, apical button of the prostate are removed from the endopelvic fascia sharply.  Then and a barrel rolled type technique, any remaining attachments from apex to the neurovascular bundle, the posterior striated sphincter, and the prerectal fat were divided sharply without cautery.  The prostate was now free and placed in a laparoscopic specimen removal bag and placed in the upper abdomen.  Hemostatic agent Pau was placed in the prostatic fossa for additional hemostasis.    A vesicourethral anastomosis is carried out with a 2-0 Stratofix suture in a running nature.  This was started at anatomic 6 o'clock for a mucosa to mucosa anastomosis.  Perineal pressure with a sponge stick by the assistant 80 did an exposure of the urethral stump.  Care was taken not to incorporate the neurovascular bundle.  At the conclusion of the anastomosis, the bladder is irrigated free of clot and the anastomosis expose for leak.  It did appear to be watertight.    At this point CO2 insufflation pressures were dropped to 6 and the pelvis inspected.  There was no active venous or arterial bleeding.  A 15 mm round ALFONZO drain is brought through the 5 mm trocar site and placed in the space or Retzius.  That trocar is removed and the drain is secured at the level of the skin with a 2-0 silk suture.  The robotic instruments are removed and the robot is undocked in standard fashion.  The patient is then taken out of Trendelenburg position.    Using standard lap scopic  instruments the strings of both specimen removal bag's, the becky package and the prostate are brought out the camera port.  CO2 insufflation is halted and all trochars are removed.  Manipulation anterior abdominal wall to remove CO2 gas is then attempted with external pressure.  The camera port incision is extended to allow removal of the specimen bags.  The previously placed Vicryl sutures for the 12 mm air seal port are then tied.  The rectus fascia of the camera is approximated using interrupted 0 PDS sutures.  All skin is closed with 4-0 Vicryl in a subcuticular nature.  Sterile dressing is applied.  The catheter is secured.  The bulb was attached with suction to the ALFONZO drain.    Patient is extubated after appropriate respiratory parameters were met per anesthesia.  The patient is transferred to recovery room in good condition.          Estimated Blood Loss: 150 mL    Specimens:                ID Type Source Tests Collected by Time   A : Prostate Tissue Prostate TISSUE PATHOLOGY EXAM Chemo Tierney MD 11/13/2018 1106   B : Left and Right Obturator Nodes Tissue Lymph Node TISSUE PATHOLOGY EXAM Chemo Tierney MD 11/13/2018 1107         Drains:   Closed/Suction Drain 1 RUQ Bulb 15 Fr. (Active)       Urethral Catheter Double-lumen 20 Fr. (Active)       Complications: none    Plan: Admit to [facility].  Guan catheterization for 10-14 days.  We will keep nothing by mouth tonight and start him on a regular diet tomorrow.    Chemo Tierney MD     Date: 11/13/2018  Time: 11:51 AM

## 2018-11-13 NOTE — ANESTHESIA POSTPROCEDURE EVALUATION
"Patient: Karan Pidera    Procedure Summary     Date:  11/13/18 Room / Location:  [facility] OR  /  [facility] OR    Anesthesia Start:  0806 Anesthesia Stop:  1203    Procedure:  PROSTATECTOMY LAPAROSCOPIC WITH DAVINCI SI ROBOT WITH PELVIC LYMPH NODE DISSECTION (N/A Abdomen) Diagnosis:       Prostate cancer (CMS/HCC)      (Prostate cancer (CMS/HCC) [C61])    Surgeon:  Chemo Tierney MD Provider:  Yuval Almendarez CRNA    Anesthesia Type:  general ASA Status:  2          Anesthesia Type: general  Last vitals  BP   125/55 (11/13/18 1322)   Temp   97.9 °F (36.6 °C) (11/13/18 1322)   Pulse   80 (11/13/18 1322)   Resp   18 (11/13/18 1322)     SpO2   92 % (11/13/18 1322)     Post Anesthesia Care and Evaluation    Patient location during evaluation: PACU  Patient participation: complete - patient participated  Level of consciousness: awake and alert  Pain management: adequate  Airway patency: patent  Anesthetic complications: No anesthetic complications  PONV Status: none  Cardiovascular status: acceptable and hemodynamically stable  Respiratory status: acceptable  Hydration status: acceptable    Comments: Blood pressure 125/55, pulse 80, temperature 97.9 °F (36.6 °C), resp. rate 18, height 175.5 cm (69.09\"), weight 92.2 kg (203 lb 4.2 oz), SpO2 92 %.    Patient discharged from PACU based upon Cresencio score. Please see RN notes for further details      "

## 2018-11-13 NOTE — ANESTHESIA PROCEDURE NOTES
ANESTHESIA INTUBATION  Urgency: elective    Airway not difficult    General Information and Staff    Patient location during procedure: OR  CRNA: Yuval Almendarez CRNA    Indications and Patient Condition  Indications for airway management: airway protection    Preoxygenated: yes  Mask difficulty assessment: 1 - vent by mask    Final Airway Details  Final airway type: endotracheal airway      Successful airway: ETT  Cuffed: yes   Successful intubation technique: direct laryngoscopy  Facilitating devices/methods: Bougie  Endotracheal tube insertion site: oral  Blade: Liriano  Blade size: 2  ETT size (mm): 7.5  Cormack-Lehane Classification: grade III - view of epiglottis only  Placement verified by: chest auscultation and capnometry   Cuff volume (mL): 6  Measured from: teeth  ETT to teeth (cm): 22  Number of attempts at approach: 1    Additional Comments  Intubated by Israel Nichole SRNA

## 2018-11-14 PROBLEM — I10 ESSENTIAL HYPERTENSION: Status: ACTIVE | Noted: 2018-11-14

## 2018-11-14 PROBLEM — A31.0 MYCOBACTERIUM AVIUM INFECTION (HCC): Status: ACTIVE | Noted: 2018-11-14

## 2018-11-14 LAB
ANION GAP SERPL CALCULATED.3IONS-SCNC: 11 MMOL/L (ref 4–13)
BASOPHILS # BLD AUTO: 0.02 10*3/MM3 (ref 0–0.2)
BASOPHILS NFR BLD AUTO: 0.2 % (ref 0–2)
BUN BLD-MCNC: 18 MG/DL (ref 5–21)
BUN/CREAT SERPL: 23.1 (ref 7–25)
CALCIUM SPEC-SCNC: 8 MG/DL (ref 8.4–10.4)
CHLORIDE SERPL-SCNC: 100 MMOL/L (ref 98–110)
CO2 SERPL-SCNC: 26 MMOL/L (ref 24–31)
CREAT BLD-MCNC: 0.78 MG/DL (ref 0.5–1.4)
CREAT FLD-MCNC: 0.8 MG/DL
CYTO UR: NORMAL
DEPRECATED RDW RBC AUTO: 37.8 FL (ref 40–54)
EOSINOPHIL # BLD AUTO: 0.01 10*3/MM3 (ref 0–0.7)
EOSINOPHIL NFR BLD AUTO: 0.1 % (ref 0–4)
ERYTHROCYTE [DISTWIDTH] IN BLOOD BY AUTOMATED COUNT: 11.9 % (ref 12–15)
GFR SERPL CREATININE-BSD FRML MDRD: 98 ML/MIN/1.73
GLUCOSE BLD-MCNC: 103 MG/DL (ref 70–100)
HCT VFR BLD AUTO: 31.2 % (ref 40–52)
HCT VFR BLD AUTO: 31.9 % (ref 40–52)
HCT VFR BLD AUTO: 34.1 % (ref 40–52)
HGB BLD-MCNC: 10.7 G/DL (ref 14–18)
HGB BLD-MCNC: 11.1 G/DL (ref 14–18)
HGB BLD-MCNC: 11.8 G/DL (ref 14–18)
IMM GRANULOCYTES # BLD: 0.05 10*3/MM3 (ref 0–0.03)
IMM GRANULOCYTES NFR BLD: 0.4 % (ref 0–5)
LAB AP CASE REPORT: NORMAL
LAB AP SYNOPTIC CHECKLIST: NORMAL
LYMPHOCYTES # BLD AUTO: 1.08 10*3/MM3 (ref 0.72–4.86)
LYMPHOCYTES NFR BLD AUTO: 9.6 % (ref 15–45)
MCH RBC QN AUTO: 29.9 PG (ref 28–32)
MCHC RBC AUTO-ENTMCNC: 34.6 G/DL (ref 33–36)
MCV RBC AUTO: 86.5 FL (ref 82–95)
MONOCYTES # BLD AUTO: 1.68 10*3/MM3 (ref 0.19–1.3)
MONOCYTES NFR BLD AUTO: 14.9 % (ref 4–12)
NEUTROPHILS # BLD AUTO: 8.43 10*3/MM3 (ref 1.87–8.4)
NEUTROPHILS NFR BLD AUTO: 74.8 % (ref 39–78)
NRBC BLD MANUAL-RTO: 0 /100 WBC (ref 0–0)
PATH REPORT.FINAL DX SPEC: NORMAL
PATH REPORT.GROSS SPEC: NORMAL
PLATELET # BLD AUTO: 202 10*3/MM3 (ref 130–400)
PMV BLD AUTO: 9.3 FL (ref 6–12)
POTASSIUM BLD-SCNC: 4.1 MMOL/L (ref 3.5–5.3)
RBC # BLD AUTO: 3.94 10*6/MM3 (ref 4.8–5.9)
SODIUM BLD-SCNC: 137 MMOL/L (ref 135–145)
WBC NRBC COR # BLD: 11.27 10*3/MM3 (ref 4.8–10.8)

## 2018-11-14 PROCEDURE — 82570 ASSAY OF URINE CREATININE: CPT | Performed by: UROLOGY

## 2018-11-14 PROCEDURE — 85018 HEMOGLOBIN: CPT | Performed by: UROLOGY

## 2018-11-14 PROCEDURE — 85014 HEMATOCRIT: CPT | Performed by: UROLOGY

## 2018-11-14 PROCEDURE — 85025 COMPLETE CBC W/AUTO DIFF WBC: CPT | Performed by: UROLOGY

## 2018-11-14 PROCEDURE — 80048 BASIC METABOLIC PNL TOTAL CA: CPT | Performed by: UROLOGY

## 2018-11-14 PROCEDURE — 63710000001 DIPHENHYDRAMINE PER 50 MG: Performed by: UROLOGY

## 2018-11-14 PROCEDURE — 25010000002 KETOROLAC TROMETHAMINE PER 15 MG: Performed by: UROLOGY

## 2018-11-14 RX ORDER — DIPHENHYDRAMINE HCL 25 MG
50 CAPSULE ORAL NIGHTLY PRN
Status: DISCONTINUED | OUTPATIENT
Start: 2018-11-14 | End: 2018-11-15 | Stop reason: HOSPADM

## 2018-11-14 RX ORDER — ETHAMBUTOL HYDROCHLORIDE 400 MG/1
2000 TABLET, FILM COATED ORAL 3 TIMES WEEKLY
Status: DISCONTINUED | OUTPATIENT
Start: 2018-11-14 | End: 2018-11-15 | Stop reason: HOSPADM

## 2018-11-14 RX ORDER — RIFAMPIN 300 MG/1
600 CAPSULE ORAL 3 TIMES WEEKLY
Status: DISCONTINUED | OUTPATIENT
Start: 2018-11-14 | End: 2018-11-15 | Stop reason: HOSPADM

## 2018-11-14 RX ORDER — AZITHROMYCIN 250 MG/1
500 TABLET, FILM COATED ORAL 3 TIMES WEEKLY
Status: DISCONTINUED | OUTPATIENT
Start: 2018-11-14 | End: 2018-11-15 | Stop reason: HOSPADM

## 2018-11-14 RX ADMIN — KETOROLAC TROMETHAMINE 15 MG: 15 INJECTION, SOLUTION INTRAMUSCULAR; INTRAVENOUS at 06:10

## 2018-11-14 RX ADMIN — SODIUM CHLORIDE 150 ML/HR: 9 INJECTION, SOLUTION INTRAVENOUS at 23:51

## 2018-11-14 RX ADMIN — SODIUM CHLORIDE 150 ML/HR: 9 INJECTION, SOLUTION INTRAVENOUS at 03:44

## 2018-11-14 RX ADMIN — SODIUM CHLORIDE 150 ML/HR: 9 INJECTION, SOLUTION INTRAVENOUS at 10:10

## 2018-11-14 RX ADMIN — DIPHENHYDRAMINE HYDROCHLORIDE 50 MG: 25 CAPSULE ORAL at 22:35

## 2018-11-14 RX ADMIN — SODIUM CHLORIDE 150 ML/HR: 9 INJECTION, SOLUTION INTRAVENOUS at 15:59

## 2018-11-14 NOTE — PROGRESS NOTES
Continued Stay Note  [facility]     Patient Name: Karan Pierda  MRN: [id number]  Today's Date: 11/14/2018    Admit Date: 11/13/2018    Discharge Plan     Row Name 11/14/18 0922       Plan    Plan  Pt is [facility] member of [employer] [id number].  May contact navigators at [phone] or [phone] for any discharge needs.    Patient/Family in Agreement with Plan  yes    Plan Comments  Visit with pt. Denies any needs at this time.   and [employer] family aware of admission and giving support.        Discharge Codes    No documentation.             Tasia Caldwell RN

## 2018-11-14 NOTE — PROGRESS NOTES
"Urology  Length of Stay: 1  Patient Care Team:  Ozzie Feliz MD as PCP - General (Internal Medicine)  Rosales, LUZ MARIA Gramajo as Referring Physician (Family Medicine)  Juanito Bailey MD as Consulting Physician (Otolaryngology)    Chief Complaint:  Follow up prostatectomy    Subjective     Interval History:   Feels \"weak\". Pain is reasonable. No flatus. Minimal nausea.     Review of Systems:   Review of Systems    Objective     Vital Signs  Temp:  [97.9 °F (36.6 °C)-98.7 °F (37.1 °C)] 98.5 °F (36.9 °C)  Heart Rate:  [61-95] 95  Resp:  [10-18] 18  BP: ()/(46-93) 125/53    Physical Exam:  Physical Exam  Abdomen mildly distended.   Incisions look normal.   Small amount of drainage around catheter   Results Review:       I reviewed the patient's new clinical results.  Lab Results (last 24 hours)     Procedure Component Value Units Date/Time    Basic Metabolic Panel [[id number]]  (Abnormal) Collected:  11/14/18 0439    Specimen:  Blood Updated:  11/14/18 0543     Glucose 103 mg/dL      BUN 18 mg/dL      Creatinine 0.78 mg/dL      Sodium 137 mmol/L      Potassium 4.1 mmol/L      Chloride 100 mmol/L      CO2 26.0 mmol/L      Calcium 8.0 mg/dL      eGFR Non African Amer 98 mL/min/1.73      BUN/Creatinine Ratio 23.1     Anion Gap 11.0 mmol/L     Narrative:       The MDRD GFR formula is only valid for adults with stable renal function between ages 18 and 70.    Creatinine, Body Fluid - Body Fluid, Peritoneum [[id number]] Collected:  11/14/18 0516    Specimen:  Body Fluid from Peritoneum Updated:  11/14/18 0539     Creatinine, Fluid 0.8 mg/dL     CBC & Differential [[id number]] Collected:  11/14/18 0439    Specimen:  Blood Updated:  11/14/18 0533    Narrative:       The following orders were created for panel order CBC & Differential.  Procedure                               Abnormality         Status                     ---------                               -----------         ------                   "   CBC Auto Differential[[id number]]        Abnormal            Final result                 Please view results for these tests on the individual orders.    CBC Auto Differential [[id number]]  (Abnormal) Collected:  11/14/18 0439    Specimen:  Blood Updated:  11/14/18 0533     WBC 11.27 10*3/mm3      RBC 3.94 10*6/mm3      Hemoglobin 11.8 g/dL      Hematocrit 34.1 %      MCV 86.5 fL      MCH 29.9 pg      MCHC 34.6 g/dL      RDW 11.9 %      RDW-SD 37.8 fl      MPV 9.3 fL      Platelets 202 10*3/mm3      Neutrophil % 74.8 %      Lymphocyte % 9.6 %      Monocyte % 14.9 %      Eosinophil % 0.1 %      Basophil % 0.2 %      Immature Grans % 0.4 %      Neutrophils, Absolute 8.43 10*3/mm3      Lymphocytes, Absolute 1.08 10*3/mm3      Monocytes, Absolute 1.68 10*3/mm3      Eosinophils, Absolute 0.01 10*3/mm3      Basophils, Absolute 0.02 10*3/mm3      Immature Grans, Absolute 0.05 10*3/mm3      nRBC 0.0 /100 WBC     Hemoglobin & Hematocrit, Blood [[id number]]  (Abnormal) Collected:  11/13/18 1630    Specimen:  Blood Updated:  11/13/18 1652     Hemoglobin 13.5 g/dL      Hematocrit 39.0 %     Tissue Pathology Exam [[id number]] Collected:  11/13/18 1106    Specimen:  Tissue from Prostate, Tissue from Lymph Node Updated:  11/13/18 1355        Imaging Results (last 24 hours)     ** No results found for the last 24 hours. **          Medication Review:     Current Facility-Administered Medications:   •  cetirizine (zyrTEC) tablet 10 mg, 10 mg, Oral, Daily, Chemo Tierney MD, 10 mg at 11/13/18 1345  •  HYDROcodone-acetaminophen (NORCO) 7.5-325 MG per tablet 1 tablet, 1 tablet, Oral, Q4H PRN, Chemo Tierney MD  •  HYDROcodone-acetaminophen (NORCO) 7.5-325 MG per tablet 2 tablet, 2 tablet, Oral, Q4H PRN, Chemo Tierney MD  •  Morphine injection 6 mg, 6 mg, Intravenous, Q2H PRN, 6 mg at 11/13/18 1345 **AND** naloxone (NARCAN) injection 0.4 mg, 0.4 mg, Intravenous, Q5 Min PRN, Chemo Tierney MD  •  ondansetron (ZOFRAN) tablet 4  mg, 4 mg, Oral, Q6H PRN **OR** ondansetron ODT (ZOFRAN-ODT) disintegrating tablet 4 mg, 4 mg, Oral, Q6H PRN **OR** ondansetron (ZOFRAN) injection 4 mg, 4 mg, Intravenous, Q6H PRN, Chemo Tierney MD  •  rosuvastatin (CRESTOR) tablet 10 mg, 10 mg, Oral, Daily, Chemo Tierney MD, 10 mg at 11/13/18 1345  •  sodium chloride 0.9 % infusion, 150 mL/hr, Intravenous, Continuous, Chemo Tierney MD, Last Rate: 150 mL/hr at 11/14/18 0344, 150 mL/hr at 11/14/18 0344    Assessment/Plan:   #1. Prostate cancer  #2. Anemia secondary to acute blood loss. His ALFONZO has put out 510 mL since surgery yesterday. Hgb preop was 15, now down to 11.   - Start liquids.             (Please note that portions of this note were completed with a voice recognition program.)  Chemo Tierney MD  11/14/18  7:09 AM

## 2018-11-14 NOTE — CONSULTS
Date of Admission: 11/13/2018  Primary Care Physician: Ozzie Feliz MD    Subjective     Physician requesting consultation: Dr. Tierney    Reason for consultation;  recommendation for management of hypertension    History of Present Illness  Patient is a 73-year-old male with history of hypertension, hyperlipidemia, Mycobacterium avium complex lung infection.  He had recently diagnosed prostate cancer.  He has underwent robotic total prostatectomy.  I am asked to see him postoperatively for recommendations for management of hypertension and other medical issues.  He takes irbesartan and hydrochlorothiazide at home.  Blood pressure generally well-controlled.  No underlying cardiac or renal disease.  He has been on antibiotics recently for Mycobacterium avium complex.        Review of Systems   No chest pain or shortness of breath.  No vomiting.  He had an episode of dizziness with standing yesterday but was able to get up and walk last night without dizziness.  Pain is relatively well controlled.  Otherwise complete ROS reviewed and negative except as mentioned in the HPI.      Past Medical History:   Past Medical History:   Diagnosis Date   • Arthritis    • Colon polyps    • High cholesterol    • Hypertension    • Mycobacterium avium complex (CMS/HCC)    • PSA elevation    • Seasonal allergies        Past Surgical History:  Past Surgical History:   Procedure Laterality Date   • COLONOSCOPY     • HERNIA REPAIR      abdominal   • PROSTATE ULTRASOUND BIOPSY  12/2016    negative   • ROTATOR CUFF REPAIR     • TONSILLECTOMY         Social History:  reports that  has never smoked. he has never used smokeless tobacco. He reports that he drinks alcohol. He reports that he does not use drugs.    Family History: family history includes No Known Problems in his father and mother.       Allergies:  No Known Allergies    Medications:  Prior to Admission medications    Medication Sig Start Date End Date Taking?  "Authorizing Provider   aspirin 81 MG EC tablet Take 81 mg by mouth Daily.   Yes Eugenio Betancur MD   azithromycin (ZITHROMAX) 500 MG tablet Take 500 mg by mouth 3 (Three) Times a Week. Monday, Wednesday, Friday   Yes Eugenio Betancur MD   coenzyme Q10 100 MG capsule Take 100 mg by mouth Daily.   Yes Eugenio Betancur MD   ethambutol (MYAMBUTOL) 400 MG tablet Take 2,000 mg by mouth 3 (Three) Times a Week. Monday, Wednesday, Friday   Yes Eugenio Betancur MD   fexofenadine (ALLEGRA) 180 MG tablet Take 180 mg by mouth Daily.   Yes Eugenio Betancur MD   hydrochlorothiazide (HYDRODIURIL) 25 MG tablet Take 25 mg by mouth Daily. 11/23/16  Yes Eugenio Betancur MD   irbesartan (AVAPRO) 300 MG tablet Take 300 mg by mouth Daily. 11/23/16  Yes Eugenio Betancur MD   Multiple Vitamins-Minerals (VISION-KATARINA PRESERVE PO) Take 1 tablet by mouth Daily.   Yes Eugenio Betancur MD   rifAMPin (RIFADIN) 300 MG capsule Take 600 mg by mouth 3 (Three) Times a Week. Monday, Wednesday, Friday   Yes Eugenio Betancur MD   rosuvastatin (CRESTOR) 10 MG tablet Take 10 mg by mouth Daily.   Yes Eugenio Betancur MD   sildenafil (VIAGRA) 100 MG tablet Take 100 mg by mouth Daily As Needed for erectile dysfunction.   Yes Eugenio Betancur MD       Objective     Vital Signs: /53 (BP Location: Left arm, Patient Position: Lying)   Pulse 95   Temp 98.5 °F (36.9 °C) (Oral)   Resp 18   Ht 175.5 cm (69.09\")   Wt 92.2 kg (203 lb 4.2 oz)   SpO2 94%   BMI 29.94 kg/m²   Physical Exam   Constitutional: He is oriented to person, place, and time. He appears well-developed and well-nourished. No distress.   HENT:   Head: Normocephalic and atraumatic.   Eyes: EOM are normal. Pupils are equal, round, and reactive to light. No scleral icterus.   Neck: Normal range of motion. Neck supple.   Cardiovascular: Normal rate, regular rhythm and normal heart sounds.   Pulmonary/Chest: Effort normal and breath sounds " normal. No respiratory distress.   Musculoskeletal: Normal range of motion. He exhibits no edema.   Lymphadenopathy:     He has no cervical adenopathy.   Neurological: He is alert and oriented to person, place, and time. No cranial nerve deficit.   Skin: Skin is warm and dry.   Psychiatric: He has a normal mood and affect. His behavior is normal.   Vitals reviewed.          Results Reviewed:  Lab Results (last 24 hours)     Procedure Component Value Units Date/Time    Basic Metabolic Panel [[id number]]  (Abnormal) Collected:  11/14/18 0439    Specimen:  Blood Updated:  11/14/18 0543     Glucose 103 mg/dL      BUN 18 mg/dL      Creatinine 0.78 mg/dL      Sodium 137 mmol/L      Potassium 4.1 mmol/L      Chloride 100 mmol/L      CO2 26.0 mmol/L      Calcium 8.0 mg/dL      eGFR Non African Amer 98 mL/min/1.73      BUN/Creatinine Ratio 23.1     Anion Gap 11.0 mmol/L     Narrative:       The MDRD GFR formula is only valid for adults with stable renal function between ages 18 and 70.    Creatinine, Body Fluid - Body Fluid, Peritoneum [[id number]] Collected:  11/14/18 0516    Specimen:  Body Fluid from Peritoneum Updated:  11/14/18 0539     Creatinine, Fluid 0.8 mg/dL     CBC & Differential [[id number]] Collected:  11/14/18 0439    Specimen:  Blood Updated:  11/14/18 0533    Narrative:       The following orders were created for panel order CBC & Differential.  Procedure                               Abnormality         Status                     ---------                               -----------         ------                     CBC Auto Differential[[id number]]        Abnormal            Final result                 Please view results for these tests on the individual orders.    CBC Auto Differential [[id number]]  (Abnormal) Collected:  11/14/18 0439    Specimen:  Blood Updated:  11/14/18 0533     WBC 11.27 10*3/mm3      RBC 3.94 10*6/mm3      Hemoglobin 11.8 g/dL      Hematocrit 34.1 %      MCV 86.5 fL      MCH 29.9 pg       MCHC 34.6 g/dL      RDW 11.9 %      RDW-SD 37.8 fl      MPV 9.3 fL      Platelets 202 10*3/mm3      Neutrophil % 74.8 %      Lymphocyte % 9.6 %      Monocyte % 14.9 %      Eosinophil % 0.1 %      Basophil % 0.2 %      Immature Grans % 0.4 %      Neutrophils, Absolute 8.43 10*3/mm3      Lymphocytes, Absolute 1.08 10*3/mm3      Monocytes, Absolute 1.68 10*3/mm3      Eosinophils, Absolute 0.01 10*3/mm3      Basophils, Absolute 0.02 10*3/mm3      Immature Grans, Absolute 0.05 10*3/mm3      nRBC 0.0 /100 WBC     Hemoglobin & Hematocrit, Blood [[id number]]  (Abnormal) Collected:  11/13/18 1630    Specimen:  Blood Updated:  11/13/18 1652     Hemoglobin 13.5 g/dL      Hematocrit 39.0 %     Tissue Pathology Exam [[id number]] Collected:  11/13/18 1106    Specimen:  Tissue from Prostate, Tissue from Lymph Node Updated:  11/13/18 1355        Imaging Results (last 24 hours)     ** No results found for the last 24 hours. **          I have personally reviewed and interpreted the radiology studies and ECG obtained at time of admission.     Assessment / Plan     Assessment & Plan  Active Hospital Problems    Diagnosis   • **Prostate cancer (CMS/HCC)   • Essential hypertension   • Mycobacterium avium infection (CMS/HCC)     1.  Hold all antihypertensive medications  2.  Up with assistance  3.  Repeat hemoglobin in a.m.      Ozzie Feliz MD   11/14/18   7:33 AM

## 2018-11-15 VITALS
HEIGHT: 69 IN | TEMPERATURE: 99.1 F | SYSTOLIC BLOOD PRESSURE: 140 MMHG | RESPIRATION RATE: 18 BRPM | WEIGHT: 203.26 LBS | BODY MASS INDEX: 30.11 KG/M2 | OXYGEN SATURATION: 95 % | DIASTOLIC BLOOD PRESSURE: 60 MMHG | HEART RATE: 96 BPM

## 2018-11-15 LAB
ANION GAP SERPL CALCULATED.3IONS-SCNC: 9 MMOL/L (ref 4–13)
BUN BLD-MCNC: 9 MG/DL (ref 5–21)
BUN/CREAT SERPL: 15.3 (ref 7–25)
CALCIUM SPEC-SCNC: 7.8 MG/DL (ref 8.4–10.4)
CHLORIDE SERPL-SCNC: 104 MMOL/L (ref 98–110)
CO2 SERPL-SCNC: 25 MMOL/L (ref 24–31)
CREAT BLD-MCNC: 0.59 MG/DL (ref 0.5–1.4)
DEPRECATED RDW RBC AUTO: 36.1 FL (ref 40–54)
ERYTHROCYTE [DISTWIDTH] IN BLOOD BY AUTOMATED COUNT: 12 % (ref 12–15)
GFR SERPL CREATININE-BSD FRML MDRD: 135 ML/MIN/1.73
GLUCOSE BLD-MCNC: 120 MG/DL (ref 70–100)
HCT VFR BLD AUTO: 29.2 % (ref 40–52)
HGB BLD-MCNC: 10.2 G/DL (ref 14–18)
MCH RBC QN AUTO: 29.3 PG (ref 28–32)
MCHC RBC AUTO-ENTMCNC: 34.9 G/DL (ref 33–36)
MCV RBC AUTO: 83.9 FL (ref 82–95)
PLATELET # BLD AUTO: 169 10*3/MM3 (ref 130–400)
PMV BLD AUTO: 9.2 FL (ref 6–12)
POTASSIUM BLD-SCNC: 3.9 MMOL/L (ref 3.5–5.3)
RBC # BLD AUTO: 3.48 10*6/MM3 (ref 4.8–5.9)
SODIUM BLD-SCNC: 138 MMOL/L (ref 135–145)
WBC NRBC COR # BLD: 9.75 10*3/MM3 (ref 4.8–10.8)

## 2018-11-15 PROCEDURE — 85027 COMPLETE CBC AUTOMATED: CPT | Performed by: UROLOGY

## 2018-11-15 PROCEDURE — 80048 BASIC METABOLIC PNL TOTAL CA: CPT | Performed by: UROLOGY

## 2018-11-15 RX ORDER — HYDROCODONE BITARTRATE AND ACETAMINOPHEN 5; 325 MG/1; MG/1
1 TABLET ORAL EVERY 6 HOURS PRN
Qty: 24 TABLET | Refills: 0 | Status: SHIPPED | OUTPATIENT
Start: 2018-11-15 | End: 2018-11-21

## 2018-11-15 RX ORDER — CEPHALEXIN 500 MG/1
500 CAPSULE ORAL ONCE
Qty: 1 CAPSULE | Refills: 0 | Status: SHIPPED | OUTPATIENT
Start: 2018-11-15 | End: 2018-11-15

## 2018-11-15 RX ADMIN — SODIUM CHLORIDE 150 ML/HR: 9 INJECTION, SOLUTION INTRAVENOUS at 06:49

## 2018-11-15 NOTE — PLAN OF CARE
Problem: Patient Care Overview  Goal: Plan of Care Review   11/14/18 0337   Coping/Psychosocial   Plan of Care Reviewed With patient   Plan of Care Review   Progress no change   OTHER   Outcome Summary pt admitted post prostatectomy. Pt's BP has been very low and almost passed out when previous shift tried to ambulate. We were able to ambulate pt tonight, however BP is still low. Still having copius amount of drainage from ALFONZO drain. Cont to caden.     Goal: Individualization and Mutuality  Outcome: Ongoing (interventions implemented as appropriate)    Goal: Discharge Needs Assessment  Outcome: Ongoing (interventions implemented as appropriate)      Problem: Prostatectomy, Radical (Adult)  Goal: Signs and Symptoms of Listed Potential Problems Will be Absent, Minimized or Managed (Prostatectomy, Radical)  Outcome: Ongoing (interventions implemented as appropriate)        
Problem: Patient Care Overview  Goal: Plan of Care Review   11/15/18 1431   Coping/Psychosocial   Plan of Care Reviewed With patient   Plan of Care Review   Progress improving   Pt tolerating a regular diet. Pt ambulating in the halls. Possible discharge this afternoon.   Goal: Individualization and Mutuality  Outcome: Ongoing (interventions implemented as appropriate)    Goal: Discharge Needs Assessment  Outcome: Ongoing (interventions implemented as appropriate)      Problem: Prostatectomy, Radical (Adult)  Goal: Signs and Symptoms of Listed Potential Problems Will be Absent, Minimized or Managed (Prostatectomy, Radical)  Outcome: Ongoing (interventions implemented as appropriate)   11/15/18 1431   Goal/Outcome Evaluation   Problems Assessed (Radical Prostatectomy) all   Problems Present (Radical Prostatectomy) pain;situational response         
Problem: Patient Care Overview  Goal: Plan of Care Review  Outcome: Ongoing (interventions implemented as appropriate)   11/15/18 6898   Coping/Psychosocial   Plan of Care Reviewed With patient;spouse   Plan of Care Review   Progress improving   OTHER   Outcome Summary Pt is feeling better this shift. Pt cont to c/o weakness. Vitals signs are better. Pt was able to ambulate in the [facility] X1. ALFONZO is now to gravity drainage and output has decreased significantly. Cont to monitor labs and VS.     Goal: Individualization and Mutuality  Outcome: Ongoing (interventions implemented as appropriate)    Goal: Discharge Needs Assessment  Outcome: Ongoing (interventions implemented as appropriate)      Problem: Prostatectomy, Radical (Adult)  Goal: Signs and Symptoms of Listed Potential Problems Will be Absent, Minimized or Managed (Prostatectomy, Radical)  Outcome: Ongoing (interventions implemented as appropriate)        
Problem: Patient Care Overview  Goal: Plan of Care Review  Outcome: Ongoing (interventions implemented as appropriate)  Pt awake and alert. Pt admitted from PACU post-op Laparoscopic prostatectomy per Dr. Tierney. Pt has lap incisions x 6 on abd. dermabond closure RONAL without drainage. Pt with ALFONZO drain to right abd with moderate amt. Of bloody drainage. Pt c/o moderate abdominal/incisional pain, Scheduled Toradol given, will follow for effect. Pt tolerating ice chips. IVF infusing. Guan catheter to BSD with clear pink urine. SCD's. Family at [facility], updated on plan of care.   11/13/18 1318   Coping/Psychosocial   Plan of Care Reviewed With patient   Plan of Care Review   Progress no change      Goal: Individualization and Mutuality  Outcome: Ongoing (interventions implemented as appropriate)   11/13/18 1318   Individualization   Patient Specific Goals (Include Timeframe) Pain kept at patient's acceptable pain rating. Tolerating diet prior to discharge.    Patient Specific Interventions IVF. Guan catheter. Monitor ALFONZO output.      Goal: Discharge Needs Assessment  Outcome: Ongoing (interventions implemented as appropriate)      Problem: Prostatectomy, Radical (Adult)  Goal: Signs and Symptoms of Listed Potential Problems Will be Absent, Minimized or Managed (Prostatectomy, Radical)   11/13/18 1318   Goal/Outcome Evaluation   Problems Assessed (Radical Prostatectomy) all   Problems Present (Radical Prostatectomy) pain;situational response     Goal: Anesthesia/Sedation Recovery  Outcome: Outcome(s) achieved Date Met: 11/13/18        
10

## 2018-11-15 NOTE — PROGRESS NOTES
Chief Complaint:  Prostate cancer      Interval History:     Overall feeling better.  Pain controlled.  Drainage has decreased.  He has been up and ambulate without lightheadedness or dizziness.    Review of Systems:   General ROS: negative for - chills or fever  Respiratory ROS: no cough, shortness of breath, or wheezing  Cardiovascular ROS: no chest pain or dyspnea on exertion  Gastrointestinal ROS: negative for - abdominal pain, diarrhea or nausea/vomiting       Vital Signs  Temp:  [98.3 °F (36.8 °C)-99.3 °F (37.4 °C)] 99.2 °F (37.3 °C)  Heart Rate:  [] 96  Resp:  [16-18] 18  BP: (126-146)/(44-64) 130/44    Intake/Output Summary (Last 24 hours) at 11/15/2018 0918  Last data filed at 11/15/2018 0755  Gross per 24 hour   Intake 3847.5 ml   Output 3205 ml   Net 642.5 ml       Physical Exam:     General Appearance:    Alert, cooperative, in no acute distress   Head:    N/A   Throat:   N/A   Neck:   N/A   Lungs:     Clear to auscultation,respirations regular, even and                  unlabored    Heart:    Regular rhythm and normal rate, normal S1 and S2, no            murmur, no gallop, no rub   Abdomen:     Normal bowel sounds, no masses, no organomegaly, soft        non-tender, non-distended, no guarding, no rebound                tenderness   Extremities:   No edema, no cyanosis, no clubbing   Pulses:   N/A   Skin:   N/A   Lymph nodes:   N/A   Neurologic:   N/A          Lab Review:       Lab Results (last 24 hours)     Procedure Component Value Units Date/Time    Basic Metabolic Panel [[id number]]  (Abnormal) Collected:  11/15/18 0637    Specimen:  Blood Updated:  11/15/18 0735     Glucose 120 mg/dL      BUN 9 mg/dL      Creatinine 0.59 mg/dL      Sodium 138 mmol/L      Potassium 3.9 mmol/L      Chloride 104 mmol/L      CO2 25.0 mmol/L      Calcium 7.8 mg/dL      eGFR Non African Amer 135 mL/min/1.73      BUN/Creatinine Ratio 15.3     Anion Gap 9.0 mmol/L     Narrative:       The MDRD GFR formula is only  valid for adults with stable renal function between ages 18 and 70.    CBC (No Diff) [[id number]]  (Abnormal) Collected:  11/15/18 0637    Specimen:  Blood Updated:  11/15/18 0705     WBC 9.75 10*3/mm3      RBC 3.48 10*6/mm3      Hemoglobin 10.2 g/dL      Hematocrit 29.2 %      MCV 83.9 fL      MCH 29.3 pg      MCHC 34.9 g/dL      RDW 12.0 %      RDW-SD 36.1 fl      MPV 9.2 fL      Platelets 169 10*3/mm3     Hemoglobin & Hematocrit, Blood [[id number]]  (Abnormal) Collected:  11/14/18 1953    Specimen:  Blood Updated:  11/14/18 2018     Hemoglobin 11.1 g/dL      Hematocrit 31.9 %     Hemoglobin & Hematocrit, Blood [[id number]]  (Abnormal) Collected:  11/14/18 1548    Specimen:  Blood Updated:  11/14/18 1606     Hemoglobin 10.7 g/dL      Hematocrit 31.2 %     Tissue Pathology Exam [[id number]] Collected:  11/13/18 1106    Specimen:  Tissue from Prostate, Tissue from Lymph Node Updated:  11/14/18 1546     Case Report --     Surgical Pathology Report                         Case: [id number]                                  Authorizing Provider:  Chemo Tierney MD       Collected:           11/13/2018 11:06 AM          Ordering Location:     [facility] OR  Received:            11/13/2018 01:55 PM          Pathologist:           Esthela Mcdonald MD                                                          Specimens:   1) - Prostate                                                                                       2) - Lymph Node, Left and Right Obturator Nodes                                             Final Diagnosis --     1.  Prostate, radical prostatectomy:  Prostatic adenocarcinoma, Brainard score 7 (3+4)  Prostatic adenocarcinoma is on both the right and left lobes of the prostate  Prostatic adenocarcinoma is confined to the prostate gland  Tumor encompasses 20% of the prostate gland.  Bilateral seminal vesicles and vas deferens are negative for prostatic adenocarcinoma.    2.  Lymph nodes, right and  "left obturator, lymph node dissection:  Six lymph nodes, negative for metastatic carcinoma (0/6)    AJCC cancer stage: pT2, pN0       Synoptic Checklist --     PROSTATE GLAND: Radical Prostatectomy  (Prostate Res - All Specimens)        SPECIMEN     Procedure:    Radical prostatectomy      Prostate Size:           Prostate Weight (g):    40 g       Prostate Greatest Dimension in Centimeters (cm):    4.5 Centimeters (cm)    TUMOR     Histologic Type:    Acinar adenocarcinoma      Histologic Grade:           Aliza Pattern:             Primary Aliza Pattern:    Pattern 3          Secondary Aliza Pattern:    Pattern 4          Tertiary [place] Pattern:    Not applicable          Total Aliza Score:    7          Grade Group:    2      Tumor Extent:           Tumor Quantitation:    Estimated percentage of prostate involved by tumor: 20 %       Extraprostatic Extension (EPE):    Not identified        Urinary Bladder Neck Invasion:    Not identified        Seminal Vesicle Invasion:    Not identified      Accessory Findings:           Treatment Effect:    No known presurgical therapy     MARGINS     Margins:    Uninvolved by invasive carcinoma     LYMPH NODES     Number of Lymph Nodes Involved:    0      Number of Lymph Nodes Examined:    6     PATHOLOGIC STAGE CLASSIFICATION (pTNM, AJCC 8th Edition)     TNM Descriptors:    Not applicable      Primary Tumor (pT):    pT2      Regional Lymph Nodes (pN):    pN0      Distant Metastasis (pM):    Not applicable - pM cannot be determined from the submitted specimen(s)        Gross Description --        *Surgery - Radical prostatectomy  *Designation - \"Prostate\"  *Labelled - Patient's name and accession number  *Received - in formalin is a prostate with attached bilateral seminal vesicles and vas deferens.    Specimen #1  *Measurement - 4.5 cm  right to left; 3.1 cm apex to base; 3.0 cm anterior to posterior  *Weight - 40 grams   *Capsule - tan/pink and intact.  Smooth and " glistening with a small amount of adherent soft tissue.  Right edge is inked blue, the left edge is inked green, the anterior surface is inked red, and the posterior surface is inked black.    *Sectioning - The prostate is sectioned from apex to base.  · Right Lobe - multiple yellow/pink periurethral nodules grossly consistent with benign hyperplasia measuring up to 1.8 cm.  There is an area of yellow/tan, firm discoloration in the anterior lateral aspect of the apical region measuring 0.7 cm  in greatest dimension and extending to within less than 0.1 cm  of the anterior lateral capsule.  These areas do not appear to cross the midline.  Multiple spherical yellow-brown concretions are identified averaging 0.1 cm in greatest dimension.  · Left Lobe -  multiple yellow/pink periurethral nodules grossly consistent with benign hyperplasia measuring up to 1.3 cm.  There is an area of yellow/tan, firm discoloration in the anterior aspect of the apical region measuring 0.6 cm  in greatest dimension and extending to within 0.1 cm of the anterior capsule.  These areas do not appear to cross the midline.  Multiple yellow-tan spherical concretions are identified averaging 0.1 cm in greatest dimension.  Much of the parenchyma in the apical region has a sponge/porous like appearance.    Right Seminal Vesicle and Vas Deferens  *Seminal vesicle measurement - 2.5 x 1.4 x 1.2 cm   *External surface - tan/brown, intact and unremarkable  *Sectioning - pink/gray and unremarkable  *Vas deferens measurement - 4.6 cm in length by 0.5 cm in diameter  *External surface - tan/brown, intact and unremarkable  *Sectioning - unremarkable    Left Seminal Vesicle and Vas Deferens  *Seminal vesicle measurement - 2.5 x 1.9 x 1.2 cm   *External surface - tan/brown, intact and unremarkable  *Sectioning - pink/gray and unremarkable  *Vas deferens measurement - 3.7 cm in length by 0.5 cm in diameter  *External surface - tan/brown, intact and  unremarkable  *Sectioning - unremarkable    Block Summary  1A through 1C -  Apical margin shaved, sectioned and submitted from right to left  1D - Right bladder neck margin shaved and sectioned  1E - Left bladder neck margin shave and sectioned  1F through 1H - Representative sections of apical region of the right lobe  1I through 1L - Representative sections of mid region of the right lobe  1M and 1N - Representative sections of base region of the right lobe  1O - Representative sections of right seminal vesicle and vas deferens  1P through 1R - Representative sections of apical region of the left lobe  1S and 1T - Representative sections of mid region of the left lobe  1U and 1V - Representative sections of base region of the left lobe  1W - Representative sections of left seminal vesicle and vas deferens    Specimen #2  Received in formalin labeled with patient's name, date of birth, and designated left and right obturator lymph nodes.  The specimen consists of multiple fragments of yellow-pink fatty tissue aggregating to 8.1 x 5.3 x 2.1 cm.  The external surfaces are lobulated with fragmentation/disruption.  Dissection reveals multiple fragments of lymph node tissue ranging from 0.3 cm to 4.6 cm in greatest dimension.  The cut surfaces of the larger lymph node candidates exhibits up to 50% fatty replacement.  Sectioning of the smaller lymph node candidates exhibits less than 10% fatty replacement.  Residual lymph node tissue is pink-gray and granular.    Block Summary  2A and 2B-sectioned lymph node candidate  2C and 2D-sectioned lymph node candidate  2E-bisected lymph node candidate  2F-three intact lymph node candidates            Microscopic Description --     1.  Histologic sections demonstrate prostatic adenocarcinoma with a Aliza score primary pattern 3 and secondary pattern of  4 for a total score of 7.  Prostatic adenocarcinoma is present in both the peripheral and central prostate.  The tumor is  confined to the prostate.  The margins of resection are negative for prostatic adenocarcinoma.  Bilateral seminal vesicles and vas deferens are negative for prostatic adenocarcinoma.  Prostatic adenocarcinoma encompasses approximately 20% of the prostate gland.    2.  Histologic sections demonstrate benign lymph node with areas of fatty replacement.  Negative for metastatic prostatic adenocarcinoma.    All of this report is an electronic transcription/translation of spoken language to printed text. The electronic translation of spoken language may permit erroneous, or at times, nonsensical words or phrases to be inadvertently transcribed; although I have reviewed the report for such errors, some may still exist.              Cultures:         Radiology Review:  Imaging Results (last 24 hours)     ** No results found for the last 24 hours. **                   ASSESSMENT:      Prostate cancer (CMS/HCC)    Essential hypertension    Mycobacterium avium infection (CMS/HCC)      PLAN:    1.  Advance diet  2.  Continue to hold and hypertensive medications.  He will monitor blood pressure at home and restart one agent at a time  3.  Okay for me to discharge home later today      Ozzie Feliz MD  11/15/18  9:18 AM        Please note that portions of this note may have been completed with a voice recognition program. Efforts were made to edit the dictations, but occasionally words are mistranscribed.

## 2018-11-15 NOTE — PROGRESS NOTES
Discharge Planning Assessment  [facility]     Patient Name: Karan Piedra  MRN: [id number]  Today's Date: 11/15/2018    Admit Date: 11/13/2018    Discharge Needs Assessment     Row Name 11/15/18 1609       Living Environment    Lives With  spouse    Name(s) of Who Lives With Patient  Mitzy    Current Living Arrangements  home/apartment/condo    Primary Care Provided by  self    Provides Primary Care For  no one    Family Caregiver if Needed  spouse;child(uzma), adult    Quality of Family Relationships  involved;helpful;supportive    Able to Return to Prior Arrangements  yes       Resource/Environmental Concerns    Resource/Environmental Concerns  none       Transition Planning    Patient/Family Anticipates Transition to  home with family    Patient/Family Anticipated Services at Transition  none    Transportation Anticipated  family or friend will provide;car, drives self       Discharge Needs Assessment    Readmission Within the Last 30 Days  no previous admission in last 30 days    Concerns to be Addressed  denies needs/concerns at this time    Equipment Currently Used at Home  none    Anticipated Changes Related to Illness  none    Equipment Needed After Discharge  none    Discharge Coordination/Progress  Pt lives at home with his spouse. He is very independent and will return home at d/c. He denies needs.         Discharge Plan     Row Name 11/15/18 1610       Plan    Final Discharge Disposition Code  01 - home or self-care        Destination      No service coordination in this encounter.      Durable Medical Equipment      No service coordination in this encounter.      Dialysis/Infusion      No service coordination in this encounter.      Home Medical Care      No service coordination in this encounter.      Community Resources      No service coordination in this encounter.        Expected Discharge Date and Time     Expected Discharge Date Expected Discharge Time    Nov 15, 2018         Demographic  Summary    No documentation.       Functional Status    No documentation.       Psychosocial    No documentation.       Abuse/Neglect    No documentation.       Legal    No documentation.       Substance Abuse    No documentation.       Patient Forms    No documentation.           DENY Miranda

## 2018-11-16 ENCOUNTER — READMISSION MANAGEMENT (OUTPATIENT)
Dept: CALL CENTER | Facility: HOSPITAL | Age: 73
End: 2018-11-16

## 2018-11-16 NOTE — OUTREACH NOTE
Prep Survey      Responses   Facility patient discharged from?  [place]   Is patient eligible?  Yes   Discharge diagnosis  Prostate cancer, s/p Prostatectomy with Davinci robot   Does the patient have one of the following disease processes/diagnoses(primary or secondary)?  General Surgery   Does the patient have Home health ordered?  No   Is there a DME ordered?  No   Comments regarding appointments  See AVS   Prep survey completed?  Yes          Christine Mosley RN

## 2018-11-16 NOTE — DISCHARGE SUMMARY
Date of Discharge:  11/16/2018    Discharge Diagnosis:   #1.  Adenocarcinoma the prostate  #2.  Anemia secondary to acute blood loss    Presenting Problem/History of Present Illness  Prostate cancer (CMS/HCC) [C61]  Prostate cancer (CMS/HCC) [C61]  Prostate cancer (CMS/HCC) [C61]       Hospital Course  Patient is a 73 y.o. male presented with intermediate risk adenocarcinoma prostate.  He underwent robot-assisted lap scopic prostatectomy and bilateral pelvic lymphadenectomy.  Patient tolerated procedure well.  [facility]  He had moderate amount of bleeding postoperatively which eventually decrease the hemoglobin below 11 with a preoperative hemoglobin of 15.  He experienced some orthostasis from this but by discharge was able to ambulate on his own with minimal assistance.  He was tolerating a regular diet.  Pathology was reviewed and discussed with patient..      Procedures Performed  Procedure(s):  PROSTATECTOMY LAPAROSCOPIC WITH DAVINCI SI ROBOT WITH PELVIC LYMPH NODE DISSECTION       Consults:   Consults     Date and Time Order Name Status Description    11/13/2018 1246 Inpatient Internal Medicine Consult Completed           Condition on Discharge:  Good    Vital Signs         Discharge Disposition  Home or Self Care    Discharge Medications     Discharge Medications      New Medications      Instructions Start Date   HYDROcodone-acetaminophen 5-325 MG per tablet  Commonly known as:  NORCO   1 tablet, Oral, Every 6 Hours PRN         Continue These Medications      Instructions Start Date   aspirin 81 MG EC tablet   81 mg, Oral, Daily      azithromycin 500 MG tablet  Commonly known as:  ZITHROMAX   500 mg, Oral, 3 Times Weekly, Monday, Wednesday, Friday      coenzyme Q10 100 MG capsule   100 mg, Oral, Daily      ethambutol 400 MG tablet  Commonly known as:  MYAMBUTOL   2,000 mg, Oral, 3 Times Weekly, Monday, Wednesday, Friday      fexofenadine 180 MG tablet  Commonly known  as:  ALLEGRA   180 mg, Oral, Daily      hydrochlorothiazide 25 MG tablet  Commonly known as:  HYDRODIURIL   25 mg, Oral, Daily      irbesartan 300 MG tablet  Commonly known as:  AVAPRO   300 mg, Oral, Daily      rifAMPin 300 MG capsule  Commonly known as:  RIFADIN   600 mg, Oral, 3 Times Weekly, Monday, Wednesday, Friday      rosuvastatin 10 MG tablet  Commonly known as:  CRESTOR   10 mg, Oral, Daily      sildenafil 100 MG tablet  Commonly known as:  VIAGRA   100 mg, Oral, Daily PRN      VISION-KATARINA PRESERVE PO   1 tablet, Oral, Daily         ASK your doctor about these medications      Instructions Start Date   cephalexin 500 MG capsule  Commonly known as:  KEFLEX  Ask about: Should I take this medication?   500 mg, Oral, Once, Take this morning of catheter removal             Discharge Diet:   Diet Instructions     Diet: Regular      Discharge Diet:  Regular          Activity at Discharge:   Activity Instructions     Discharge Activity Restrictions      1) No driving for 2 weeks and no longer taking narcotics.   2) Return to school / work in 4 weeks.  3) May shower / sponge bathe today. No tub baths until renee catheter is removed.   4) Do not lift / push / pull more then 10 lbs for four weeks.  5) Avoid ATV's/bicycles/motorcycle for 4 weeks.  6) No suppositories or enemas for 3 weeks.          Follow-up Appointments  Future Appointments   Date Time Provider Department Center   11/26/2018  8:30 AM Chemo Tierney MD [facility] None     Additional Instructions for the Follow-ups that You Need to Schedule     Call MD With Problems / Concerns   As directed      If catheter stops draining; If urine becomes bloody enough to form clots in tubing; If wound drainage requires dressing changes more than twice daily; If you have fever >101F; If you have nausea and vomiting more than once in a day, especially if abdomen is distended;    Order Comments:  If catheter stops draining; If urine becomes bloody enough to form clots  in tubing; If wound drainage requires dressing changes more than twice daily; If you have fever >101F; If you have nausea and vomiting more than once in a day, especially if abdomen is distended;          Discharge Follow-up with Specified Provider: appointment on Monday 11/26/18 to remove catheter   As directed      To:  appointment on Monday 11/26/18 to remove catheter               Test Results Pending at Discharge       Chemo Tierney MD  11/16/18  3:14 PM    Time: Discharge 15 min

## 2018-11-19 ENCOUNTER — READMISSION MANAGEMENT (OUTPATIENT)
Dept: CALL CENTER | Facility: HOSPITAL | Age: 73
End: 2018-11-19

## 2018-11-19 LAB
Lab: ABNORMAL
MYCOBACTERIUM SPEC CULT: ABNORMAL
MYCOBACTERIUM SPEC CULT: ABNORMAL
MYCOBACTERIUM SPEC CULT: NORMAL
NIGHT BLUE STAIN TISS: ABNORMAL
NIGHT BLUE STAIN TISS: NORMAL
NIGHT BLUE STAIN TISS: NORMAL

## 2018-11-19 NOTE — OUTREACH NOTE
General Surgery Week 1 Survey      Responses   Facility patient discharged from?  [place]   Does the patient have one of the following disease processes/diagnoses(primary or secondary)?  General Surgery   Is there a successful TCM telephone encounter documented?  No   Week 1 attempt successful?  No   Unsuccessful attempts  Attempt 1          Felisha Jules RN

## 2018-11-20 ENCOUNTER — READMISSION MANAGEMENT (OUTPATIENT)
Dept: CALL CENTER | Facility: HOSPITAL | Age: 73
End: 2018-11-20

## 2018-11-20 NOTE — PROGRESS NOTES
"  Mr. Piedra is 73 y.o. male    CHIEF COMPLAINT: I am here for hospital follow up and to have catheter removed.    HPI  Follow up Prostate cancer  Location: Prostate gland  Quality:  Adenocarcinoma  Severity: Intermediate risk cancer that appears to be confined based on pathology.   Duration: Diagnosed 08/2018.   Context: This was identified when he had a biopsy to evaluate elevated PSA which was obtained as part of prostate cancer screening.  Modifying factors: Underwent robot assisted laparoscopic prostatectomy.   Associated signs or symptom. Patient denies any possible systemic symptoms of prostate cancer such as weight loss, lower extremity edema, or skeletal pain that could be worrisome for metastases.  History related to this issue:   - 12/2016: TRUS/Bx: Negative  - 08/2018: Uronav Fusion biopsy  Final Diagnosis   1.  Prostate, right mid lateral, needle biopsy: Benign prostate tissue.     2.  Prostate, right mid medial, needle biopsy: Adenocarcinoma, Aliza grade 3+3 equals score of 6, in 1 of 1 cores, involving 6% of needle core tissue and measuring 1 mm in length.     3.  Prostate, right lateral base, needle biopsy: Benign prostate tissue.     4.  Prostate, right mid base, needle biopsy: Benign prostate tissue.     5.  Prostate, right lateral apex, needle biopsy: Benign prostate tissue.     6.  Prostate, right mid apex, needle biopsy: Benign prostate tissue.     7.  Prostate, left mid lateral, needle biopsy: Benign prostate tissue.     8.  Prostate, left mid medial, needle biopsy: Benign prostate tissue.     9.  Prostate, left lateral base, needle biopsy: Benign prostate tissue.     10.  Prostate, left mid base, needle biopsy: Benign prostate tissue.     11.  Prostate, left lateral apex, needle biopsy: Benign prostate tissue.     12.  Prostate, left mid apex, needle biopsy: Benign prostate tissue.     13.  Prostate, right mid gland, designated \"lesion 1\", needle biopsy: Adenocarcinoma, Aliza grades 3 + " 4 equals score of 7 in 1 of 1 cores, involving 20% of needle core tissue and measuring 7 millimeters in length.       Electronically signed by David Hernandez MD on 8/9/2018 at 1333       - 11/14/2018 Robot-assisted laparoscopic prostatectomy and bilateral pelvic lymphadenectomy  Final Diagnosis   1.  Prostate, radical prostatectomy:  Prostatic adenocarcinoma, Aliza score 7 (3+4)  Prostatic adenocarcinoma is on both the right and left lobes of the prostate  Prostatic adenocarcinoma is confined to the prostate gland  Tumor encompasses 20% of the prostate gland.  Bilateral seminal vesicles and vas deferens are negative for prostatic adenocarcinoma.     2.  Lymph nodes, right and left obturator, lymph node dissection:  Six lymph nodes, negative for metastatic carcinoma (0/6)     AJCC cancer stage: pT2, pN0   Electronically signed by Esthela Mcdonald MD on 11/14/2018 at 1546           The following portions of the patient's history were reviewed and updated as appropriate: allergies, current medications, past family history, past medical history, past social history, past surgical history and problem list.      Review of Systems   Constitutional: Negative for chills and fever.   Gastrointestinal: Negative for abdominal pain, anal bleeding and blood in stool.   Genitourinary: Negative for dysuria, frequency, hematuria and urgency.           Current Outpatient Medications:   •  aspirin 81 MG EC tablet, Take 81 mg by mouth Daily., Disp: , Rfl:   •  azithromycin (ZITHROMAX) 500 MG tablet, Take 500 mg by mouth 3 (Three) Times a Week. Monday, Wednesday, Friday, Disp: , Rfl:   •  coenzyme Q10 100 MG capsule, Take 100 mg by mouth Daily., Disp: , Rfl:   •  ethambutol (MYAMBUTOL) 400 MG tablet, Take 2,000 mg by mouth 3 (Three) Times a Week. Monday, Wednesday, Friday, Disp: , Rfl:   •  fexofenadine (ALLEGRA) 180 MG tablet, Take 180 mg by mouth Daily., Disp: , Rfl:   •  hydrochlorothiazide (HYDRODIURIL) 25 MG tablet, Take  "25 mg by mouth Daily., Disp: , Rfl:   •  irbesartan (AVAPRO) 300 MG tablet, Take 300 mg by mouth Daily., Disp: , Rfl:   •  Multiple Vitamins-Minerals (VISION-KATARINA PRESERVE PO), Take 1 tablet by mouth Daily., Disp: , Rfl:   •  rifAMPin (RIFADIN) 300 MG capsule, Take 600 mg by mouth 3 (Three) Times a Week. Monday, Wednesday, Friday, Disp: , Rfl:   •  rosuvastatin (CRESTOR) 10 MG tablet, Take 10 mg by mouth Daily., Disp: , Rfl:   •  sildenafil (VIAGRA) 100 MG tablet, Take 100 mg by mouth Daily As Needed for erectile dysfunction., Disp: , Rfl:     Past Medical History:   Diagnosis Date   • Arthritis    • Colon polyps    • High cholesterol    • Hypertension    • Mycobacterium avium complex (CMS/HCC)    • PSA elevation    • Seasonal allergies        Past Surgical History:   Procedure Laterality Date   • COLONOSCOPY     • HERNIA REPAIR      abdominal   • PROSTATE ULTRASOUND BIOPSY  12/2016    negative   • ROTATOR CUFF REPAIR     • TONSILLECTOMY         Social History     Socioeconomic History   • Marital status:      Spouse name: Not on file   • Number of children: 2   • Years of education: Not on file   • Highest education level: Not on file   Occupational History   • Occupation: Human Resources     Comment: [employer]   Tobacco Use   • Smoking status: Never Smoker   • Smokeless tobacco: Never Used   Substance and Sexual Activity   • Alcohol use: Yes     Comment: OCC   • Drug use: No   • Sexual activity: Defer       Family History   Problem Relation Age of Onset   • No Known Problems Father    • No Known Problems Mother          Temp 97.2 °F (36.2 °C)   Ht 172.7 cm (68\")   Wt 88.9 kg (196 lb)   BMI 29.80 kg/m²       Physical Exam  His wounds look good.  No erythema or drainage.  Some ecchymosis around the left lateral incision.  .  Does have a minimal amount of left-sided foot edema.  The calf is soft and symmetric with the other side.      Assessment and Plan  Diagnoses and all orders for this visit:    Prostate " cancer (CMS/HCC)  -     PSA DIAGNOSTIC; Future        He will get his initial PSA in one month.  Activity restrictions discussed.  He can start driving tomorrow from my standpoint.        (Please note that portions of this note were completed with a voice recognition program.)  Chemo Tierney MD  11/26/18  8:59 AM      Cc: Dr. Feliz

## 2018-11-20 NOTE — OUTREACH NOTE
General Surgery Week 1 Survey      Responses   Facility patient discharged from?  [facility]   Does the patient have one of the following disease processes/diagnoses(primary or secondary)?  General Surgery   Is there a successful TCM telephone encounter documented?  No   Week 1 attempt successful?  No   Call start time  1614   Revoke  Decline to participate   Call end time  1615   Is patient permission given to speak with other caregiver?  Yes          Felisha Jules RN

## 2018-11-26 ENCOUNTER — OFFICE VISIT (OUTPATIENT)
Dept: UROLOGY | Facility: CLINIC | Age: 73
End: 2018-11-26

## 2018-11-26 VITALS — TEMPERATURE: 97.2 F | WEIGHT: 196 LBS | BODY MASS INDEX: 29.7 KG/M2 | HEIGHT: 68 IN

## 2018-11-26 DIAGNOSIS — C61 PROSTATE CANCER (HCC): Primary | ICD-10-CM

## 2018-11-26 PROCEDURE — 99024 POSTOP FOLLOW-UP VISIT: CPT | Performed by: UROLOGY

## 2018-12-15 PROCEDURE — 87116 MYCOBACTERIA CULTURE: CPT | Performed by: INTERNAL MEDICINE

## 2018-12-15 PROCEDURE — 87206 SMEAR FLUORESCENT/ACID STAI: CPT | Performed by: INTERNAL MEDICINE

## 2018-12-16 PROCEDURE — 87116 MYCOBACTERIA CULTURE: CPT | Performed by: INTERNAL MEDICINE

## 2018-12-16 PROCEDURE — 87206 SMEAR FLUORESCENT/ACID STAI: CPT | Performed by: INTERNAL MEDICINE

## 2018-12-17 ENCOUNTER — TRANSCRIBE ORDERS (OUTPATIENT)
Dept: ADMINISTRATIVE | Facility: HOSPITAL | Age: 73
End: 2018-12-17

## 2018-12-17 ENCOUNTER — APPOINTMENT (OUTPATIENT)
Dept: LAB | Facility: HOSPITAL | Age: 73
End: 2018-12-17
Attending: INTERNAL MEDICINE

## 2018-12-17 ENCOUNTER — HOSPITAL ENCOUNTER (OUTPATIENT)
Dept: GENERAL RADIOLOGY | Facility: HOSPITAL | Age: 73
Discharge: HOME | End: 2018-12-17
Attending: INTERNAL MEDICINE | Admitting: INTERNAL MEDICINE

## 2018-12-17 DIAGNOSIS — A31.0 PULMONARY DISEASE DUE TO MYCOBACTERIA (HCC): Primary | ICD-10-CM

## 2018-12-17 DIAGNOSIS — A31.0 PULMONARY DISEASE DUE TO MYCOBACTERIA (HCC): ICD-10-CM

## 2018-12-17 LAB
ALBUMIN SERPL-MCNC: 4.4 G/DL (ref 3.5–5)
ALBUMIN/GLOB SERPL: 1.5 G/DL (ref 1.1–2.5)
ALP SERPL-CCNC: 129 U/L (ref 24–120)
ALT SERPL W P-5'-P-CCNC: 24 U/L (ref 0–54)
ANION GAP SERPL CALCULATED.3IONS-SCNC: 12 MMOL/L (ref 4–13)
AST SERPL-CCNC: 30 U/L (ref 7–45)
BASOPHILS # BLD AUTO: 0.05 10*3/MM3 (ref 0–0.2)
BASOPHILS NFR BLD AUTO: 1 % (ref 0–2)
BILIRUB SERPL-MCNC: 0.6 MG/DL (ref 0.1–1)
BUN BLD-MCNC: 19 MG/DL (ref 5–21)
BUN/CREAT SERPL: 23.5 (ref 7–25)
CALCIUM SPEC-SCNC: 9 MG/DL (ref 8.4–10.4)
CHLORIDE SERPL-SCNC: 101 MMOL/L (ref 98–110)
CO2 SERPL-SCNC: 29 MMOL/L (ref 24–31)
CREAT BLD-MCNC: 0.81 MG/DL (ref 0.5–1.4)
DEPRECATED RDW RBC AUTO: 37.9 FL (ref 40–54)
EOSINOPHIL # BLD AUTO: 0.28 10*3/MM3 (ref 0–0.7)
EOSINOPHIL NFR BLD AUTO: 5.4 % (ref 0–4)
ERYTHROCYTE [DISTWIDTH] IN BLOOD BY AUTOMATED COUNT: 12.4 % (ref 12–15)
GFR SERPL CREATININE-BSD FRML MDRD: 94 ML/MIN/1.73
GLOBULIN UR ELPH-MCNC: 2.9 GM/DL
GLUCOSE BLD-MCNC: 113 MG/DL (ref 70–100)
HCT VFR BLD AUTO: 41 % (ref 40–52)
HGB BLD-MCNC: 13.5 G/DL (ref 14–18)
IMM GRANULOCYTES # BLD: 0.01 10*3/MM3 (ref 0–0.03)
IMM GRANULOCYTES NFR BLD: 0.2 % (ref 0–5)
LYMPHOCYTES # BLD AUTO: 1.37 10*3/MM3 (ref 0.72–4.86)
LYMPHOCYTES NFR BLD AUTO: 26.2 % (ref 15–45)
MCH RBC QN AUTO: 27.5 PG (ref 28–32)
MCHC RBC AUTO-ENTMCNC: 32.9 G/DL (ref 33–36)
MCV RBC AUTO: 83.5 FL (ref 82–95)
MONOCYTES # BLD AUTO: 0.49 10*3/MM3 (ref 0.19–1.3)
MONOCYTES NFR BLD AUTO: 9.4 % (ref 4–12)
NEUTROPHILS # BLD AUTO: 3.03 10*3/MM3 (ref 1.87–8.4)
NEUTROPHILS NFR BLD AUTO: 57.8 % (ref 39–78)
NRBC BLD MANUAL-RTO: 0 /100 WBC (ref 0–0)
PLATELET # BLD AUTO: 209 10*3/MM3 (ref 130–400)
PMV BLD AUTO: 9.4 FL (ref 6–12)
POTASSIUM BLD-SCNC: 3.8 MMOL/L (ref 3.5–5.3)
PROT SERPL-MCNC: 7.3 G/DL (ref 6.3–8.7)
RBC # BLD AUTO: 4.91 10*6/MM3 (ref 4.8–5.9)
SODIUM BLD-SCNC: 142 MMOL/L (ref 135–145)
WBC NRBC COR # BLD: 5.23 10*3/MM3 (ref 4.8–10.8)

## 2018-12-17 PROCEDURE — 87116 MYCOBACTERIA CULTURE: CPT | Performed by: INTERNAL MEDICINE

## 2018-12-17 PROCEDURE — 36415 COLL VENOUS BLD VENIPUNCTURE: CPT

## 2018-12-17 PROCEDURE — 71046 X-RAY EXAM CHEST 2 VIEWS: CPT

## 2018-12-17 PROCEDURE — 80053 COMPREHEN METABOLIC PANEL: CPT | Performed by: INTERNAL MEDICINE

## 2018-12-17 PROCEDURE — 87206 SMEAR FLUORESCENT/ACID STAI: CPT | Performed by: INTERNAL MEDICINE

## 2018-12-17 PROCEDURE — 85025 COMPLETE CBC W/AUTO DIFF WBC: CPT | Performed by: INTERNAL MEDICINE

## 2018-12-19 NOTE — PROGRESS NOTES
Mr. Piedra is 73 y.o. male    CHIEF COMPLAINT: I am here for follow up on prostate cancer. My PSA is <0.070    HPI  F/u prostatectomy  Still some perineal pain    Lab Results   Component Value Date    PSA <0.070 12/21/2018    PSA 8.610 (H) 07/05/2018    PSA 6.520 (H) 12/22/2017       The following portions of the patient's history were reviewed and updated as appropriate: allergies, current medications, past family history, past medical history, past social history, past surgical history and problem list.      Review of Systems   Constitutional: Negative for chills and fever.   Gastrointestinal: Negative for abdominal pain, anal bleeding and blood in stool.   Genitourinary: Negative for dysuria, frequency, hematuria and urgency.           Current Outpatient Medications:   •  aspirin 81 MG EC tablet, Take 81 mg by mouth Daily., Disp: , Rfl:   •  azithromycin (ZITHROMAX) 500 MG tablet, Take 500 mg by mouth 3 (Three) Times a Week. Monday, Wednesday, Friday, Disp: , Rfl:   •  coenzyme Q10 100 MG capsule, Take 100 mg by mouth Daily., Disp: , Rfl:   •  ethambutol (MYAMBUTOL) 400 MG tablet, Take 2,000 mg by mouth 3 (Three) Times a Week. Monday, Wednesday, Friday, Disp: , Rfl:   •  fexofenadine (ALLEGRA) 180 MG tablet, Take 180 mg by mouth Daily., Disp: , Rfl:   •  hydrochlorothiazide (HYDRODIURIL) 25 MG tablet, Take 25 mg by mouth Daily., Disp: , Rfl:   •  irbesartan (AVAPRO) 300 MG tablet, Take 300 mg by mouth Daily., Disp: , Rfl:   •  Multiple Vitamins-Minerals (VISION-KATARINA PRESERVE PO), Take 1 tablet by mouth Daily., Disp: , Rfl:   •  rifAMPin (RIFADIN) 300 MG capsule, Take 600 mg by mouth 3 (Three) Times a Week. Monday, Wednesday, Friday, Disp: , Rfl:   •  rosuvastatin (CRESTOR) 10 MG tablet, Take 10 mg by mouth Daily., Disp: , Rfl:   •  sildenafil (REVATIO) 20 MG tablet, Take 1 tablet daily., Disp: 30 tablet, Rfl: 11    Past Medical History:   Diagnosis Date   • Arthritis    • Colon polyps    • High cholesterol   "  • Hypertension    • Mycobacterium avium complex (CMS/HCC)    • PSA elevation    • Seasonal allergies        Past Surgical History:   Procedure Laterality Date   • COLONOSCOPY     • HERNIA REPAIR      abdominal   • PROSTATE BIOPSY N/A 8/7/2018    Procedure: PROSTATE ULTRASOUND  URONAV FUSION BIOPSY;  Surgeon: Chemo Tierney MD;  Location: [facility] OR;  Service: Urology   • PROSTATE ULTRASOUND BIOPSY  12/2016    negative   • PROSTATECTOMY N/A 11/13/2018    Procedure: PROSTATECTOMY LAPAROSCOPIC WITH DAVINCI SI ROBOT WITH PELVIC LYMPH NODE DISSECTION;  Surgeon: Chemo Tierney MD;  Location: [facility] OR;  [facility]   • ROTATOR CUFF REPAIR     • TONSILLECTOMY         Social History     Socioeconomic History   • Marital status:      Spouse name: Not on file   • Number of children: 2   • Years of education: Not on file   • Highest education level: Not on file   Occupational History   • Occupation: Human Resources     Comment: [employer]   Tobacco Use   • Smoking status: Never Smoker   • Smokeless tobacco: Never Used   Substance and Sexual Activity   • Alcohol use: Yes     Comment: OCC   • Drug use: No   • Sexual activity: Defer       Family History   Problem Relation Age of Onset   • No Known Problems Father    • No Known Problems Mother          /78   Temp 97.8 °F (36.6 °C)   Ht 175.3 cm (69\")   Wt 88.9 kg (196 lb)   BMI 28.94 kg/m²       Physical Exam  Incisions look good.  No evidence of hernia.    Data  Results for orders placed or performed in visit on 12/28/18   POC Urinalysis Dipstick, Multipro   Result Value Ref Range    Color Yellow Yellow, Straw, Dark Yellow, Soni    Clarity, UA Clear Clear    Glucose, UA Negative Negative, 1000 mg/dL (3+) mg/dL    Bilirubin Negative Negative    Ketones, UA Negative Negative    Specific Gravity  1.025 1.005 - 1.030    Blood, UA Large (A) Negative    pH, Urine 7.0 5.0 - 8.0    Protein, POC Negative Negative mg/dL    Urobilinogen, UA Normal Normal    Nitrite, " UA Negative Negative    Leukocytes Small (1+) (A) Negative         Imaging Results (last 7 days)     ** No results found for the last 168 hours. **        Patient's Body mass index is 28.94 kg/m². BMI is above normal parameters. Recommendations include: educational material.    Assessment and Plan  Diagnoses and all orders for this visit:    Prostate cancer (CMS/[facility])  -     POC Urinalysis Dipstick, Multipro  -     sildenafil (REVATIO) 20 MG tablet; Take 1 tablet daily.  -     PSA DIAGNOSTIC; Future    The PSA is <0.07 which is undetectable suggesting no clinical evidence of prostate cancer   Patient will continue pelvic floor exercises.  His incontinence is breathing easy now wears 1 thick pad per day.    (Please note that portions of this note were completed with a voice recognition program.)  Chemo Tierney MD  12/28/18  10:00 AM      Cc: Ozzie Feliz MD

## 2018-12-21 LAB — PSA SERPL-MCNC: <0.07 NG/ML (ref 0–4)

## 2018-12-22 ENCOUNTER — RESULTS ENCOUNTER (OUTPATIENT)
Dept: UROLOGY | Facility: CLINIC | Age: 73
End: 2018-12-22

## 2018-12-22 DIAGNOSIS — C61 PROSTATE CANCER (HCC): ICD-10-CM

## 2018-12-28 ENCOUNTER — OFFICE VISIT (OUTPATIENT)
Dept: UROLOGY | Facility: CLINIC | Age: 73
End: 2018-12-28

## 2018-12-28 VITALS
DIASTOLIC BLOOD PRESSURE: 78 MMHG | TEMPERATURE: 97.8 F | HEIGHT: 69 IN | WEIGHT: 196 LBS | BODY MASS INDEX: 29.03 KG/M2 | SYSTOLIC BLOOD PRESSURE: 130 MMHG

## 2018-12-28 DIAGNOSIS — C61 PROSTATE CANCER (HCC): Primary | ICD-10-CM

## 2018-12-28 LAB
BILIRUB BLD-MCNC: NEGATIVE MG/DL
CLARITY, POC: CLEAR
COLOR UR: YELLOW
GLUCOSE UR STRIP-MCNC: NEGATIVE MG/DL
KETONES UR QL: NEGATIVE
LEUKOCYTE EST, POC: ABNORMAL
NITRITE UR-MCNC: NEGATIVE MG/ML
PH UR: 7 [PH] (ref 5–8)
PROT UR STRIP-MCNC: NEGATIVE MG/DL
RBC # UR STRIP: ABNORMAL /UL
SP GR UR: 1.02 (ref 1–1.03)
UROBILINOGEN UR QL: NORMAL

## 2018-12-28 PROCEDURE — 99024 POSTOP FOLLOW-UP VISIT: CPT | Performed by: UROLOGY

## 2018-12-28 PROCEDURE — 81003 URINALYSIS AUTO W/O SCOPE: CPT | Performed by: UROLOGY

## 2018-12-28 RX ORDER — SILDENAFIL CITRATE 20 MG/1
TABLET ORAL
Qty: 30 TABLET | Refills: 11 | Status: SHIPPED | OUTPATIENT
Start: 2018-12-28 | End: 2020-11-09

## 2018-12-28 NOTE — PATIENT INSTRUCTIONS

## 2019-01-15 LAB
Lab: ABNORMAL
Lab: ABNORMAL
MYCOBACTERIUM SPEC CULT: ABNORMAL
NIGHT BLUE STAIN TISS: ABNORMAL
NIGHT BLUE STAIN TISS: ABNORMAL

## 2019-01-20 LAB
Lab: ABNORMAL
MYCOBACTERIUM SPEC CULT: ABNORMAL
NIGHT BLUE STAIN TISS: ABNORMAL
NIGHT BLUE STAIN TISS: ABNORMAL

## 2019-01-23 ENCOUNTER — APPOINTMENT (OUTPATIENT)
Dept: LAB | Facility: HOSPITAL | Age: 74
End: 2019-01-23
Attending: INTERNAL MEDICINE

## 2019-01-23 ENCOUNTER — TRANSCRIBE ORDERS (OUTPATIENT)
Dept: ADMINISTRATIVE | Facility: HOSPITAL | Age: 74
End: 2019-01-23

## 2019-01-23 DIAGNOSIS — R05.9 COUGH: ICD-10-CM

## 2019-01-23 DIAGNOSIS — A31.0 PULMONARY DISEASE DUE TO MYCOBACTERIA (HCC): Primary | ICD-10-CM

## 2019-01-23 LAB
ALBUMIN SERPL-MCNC: 4.7 G/DL (ref 3.5–5)
ALBUMIN/GLOB SERPL: 1.6 G/DL (ref 1.1–2.5)
ALP SERPL-CCNC: 145 U/L (ref 24–120)
ALT SERPL W P-5'-P-CCNC: 22 U/L (ref 0–54)
ANION GAP SERPL CALCULATED.3IONS-SCNC: 12 MMOL/L (ref 4–13)
AST SERPL-CCNC: 28 U/L (ref 7–45)
BASOPHILS # BLD AUTO: 0.03 10*3/MM3 (ref 0–0.2)
BASOPHILS NFR BLD AUTO: 0.6 % (ref 0–2)
BILIRUB SERPL-MCNC: 0.6 MG/DL (ref 0.1–1)
BUN BLD-MCNC: 18 MG/DL (ref 5–21)
BUN/CREAT SERPL: 24.7 (ref 7–25)
CALCIUM SPEC-SCNC: 9 MG/DL (ref 8.4–10.4)
CHLORIDE SERPL-SCNC: 99 MMOL/L (ref 98–110)
CO2 SERPL-SCNC: 29 MMOL/L (ref 24–31)
CREAT BLD-MCNC: 0.73 MG/DL (ref 0.5–1.4)
DEPRECATED RDW RBC AUTO: 37.4 FL (ref 40–54)
EOSINOPHIL # BLD AUTO: 0.16 10*3/MM3 (ref 0–0.7)
EOSINOPHIL NFR BLD AUTO: 3 % (ref 0–4)
ERYTHROCYTE [DISTWIDTH] IN BLOOD BY AUTOMATED COUNT: 12.8 % (ref 12–15)
GFR SERPL CREATININE-BSD FRML MDRD: 106 ML/MIN/1.73
GLOBULIN UR ELPH-MCNC: 3 GM/DL
GLUCOSE BLD-MCNC: 80 MG/DL (ref 70–100)
HCT VFR BLD AUTO: 46.1 % (ref 40–52)
HGB BLD-MCNC: 15.3 G/DL (ref 14–18)
IMM GRANULOCYTES # BLD AUTO: 0.02 10*3/MM3 (ref 0–0.03)
IMM GRANULOCYTES NFR BLD AUTO: 0.4 % (ref 0–5)
LYMPHOCYTES # BLD AUTO: 1.3 10*3/MM3 (ref 0.72–4.86)
LYMPHOCYTES NFR BLD AUTO: 24.2 % (ref 15–45)
MCH RBC QN AUTO: 26.9 PG (ref 28–32)
MCHC RBC AUTO-ENTMCNC: 33.2 G/DL (ref 33–36)
MCV RBC AUTO: 81.2 FL (ref 82–95)
MONOCYTES # BLD AUTO: 0.53 10*3/MM3 (ref 0.19–1.3)
MONOCYTES NFR BLD AUTO: 9.9 % (ref 4–12)
NEUTROPHILS # BLD AUTO: 3.33 10*3/MM3 (ref 1.87–8.4)
NEUTROPHILS NFR BLD AUTO: 61.9 % (ref 39–78)
NRBC BLD AUTO-RTO: 0 /100 WBC (ref 0–0)
PLATELET # BLD AUTO: 225 10*3/MM3 (ref 130–400)
PMV BLD AUTO: 9.4 FL (ref 6–12)
POTASSIUM BLD-SCNC: 3.9 MMOL/L (ref 3.5–5.3)
PROT SERPL-MCNC: 7.7 G/DL (ref 6.3–8.7)
RBC # BLD AUTO: 5.68 10*6/MM3 (ref 4.8–5.9)
SODIUM BLD-SCNC: 140 MMOL/L (ref 135–145)
WBC NRBC COR # BLD: 5.37 10*3/MM3 (ref 4.8–10.8)

## 2019-01-23 PROCEDURE — 36415 COLL VENOUS BLD VENIPUNCTURE: CPT | Performed by: INTERNAL MEDICINE

## 2019-01-23 PROCEDURE — 80053 COMPREHEN METABOLIC PANEL: CPT | Performed by: INTERNAL MEDICINE

## 2019-01-23 PROCEDURE — 85025 COMPLETE CBC W/AUTO DIFF WBC: CPT | Performed by: INTERNAL MEDICINE

## 2019-01-28 LAB
MYCOBACTERIUM SPEC CULT: NORMAL
NIGHT BLUE STAIN TISS: NORMAL
NIGHT BLUE STAIN TISS: NORMAL

## 2019-02-04 PROCEDURE — 87206 SMEAR FLUORESCENT/ACID STAI: CPT | Performed by: INTERNAL MEDICINE

## 2019-02-04 PROCEDURE — 87116 MYCOBACTERIA CULTURE: CPT | Performed by: INTERNAL MEDICINE

## 2019-02-05 PROCEDURE — 87186 SC STD MICRODIL/AGAR DIL: CPT

## 2019-02-05 PROCEDURE — 87206 SMEAR FLUORESCENT/ACID STAI: CPT | Performed by: INTERNAL MEDICINE

## 2019-02-05 PROCEDURE — 87181 SC STD AGAR DILUTION PER AGT: CPT

## 2019-02-05 PROCEDURE — 87116 MYCOBACTERIA CULTURE: CPT | Performed by: INTERNAL MEDICINE

## 2019-02-06 ENCOUNTER — LAB REQUISITION (OUTPATIENT)
Dept: LAB | Facility: HOSPITAL | Age: 74
End: 2019-02-06

## 2019-02-06 DIAGNOSIS — Z00.00 ENCOUNTER FOR GENERAL ADULT MEDICAL EXAMINATION WITHOUT ABNORMAL FINDINGS: ICD-10-CM

## 2019-02-06 PROCEDURE — 87206 SMEAR FLUORESCENT/ACID STAI: CPT | Performed by: INTERNAL MEDICINE

## 2019-02-06 PROCEDURE — 87116 MYCOBACTERIA CULTURE: CPT | Performed by: INTERNAL MEDICINE

## 2019-03-20 LAB
MYCOBACTERIUM SPEC CULT: NORMAL
NIGHT BLUE STAIN TISS: NORMAL
NIGHT BLUE STAIN TISS: NORMAL

## 2019-03-21 LAB — PSA SERPL-MCNC: <0.07 NG/ML (ref 0–4)

## 2019-03-23 ENCOUNTER — RESULTS ENCOUNTER (OUTPATIENT)
Dept: UROLOGY | Facility: CLINIC | Age: 74
End: 2019-03-23

## 2019-03-23 DIAGNOSIS — C61 PROSTATE CANCER (HCC): ICD-10-CM

## 2019-04-09 NOTE — PROGRESS NOTES
Mr. Piedra is 73 y.o. male    CHIEF COMPLAINT: I am here for follow up on prostate cancer. My PSA is <0.070    HPI  Follow up Prostate cancer  Location: Prostate gland  Quality:  Adenocarcinoma  Severity: Intermediate risk disease with organ confinement.  He is currently in biochemical remission.  Duration: Diagnosed in August 2018 by UroNav MRI fusion biopsy.  Context: This was identified when he had a biopsy to evaluate elevated PSA which was obtained as part of prostate cancer screening.  Modifying factors: He underwent robot-assisted lap scopic prostatectomy which has resulted in biochemical remission.  Associated signs or symptoms: From a voiding standpoint he has 0 pad per day incontinence.  His stream is improved.. Patient denies any possible systemic symptoms of prostate cancer such as weight loss, lower extremity edema, or skeletal pain that could be worrisome for metastases.  History related to this issue:   - 11/2018: Robot assisted laparoscopic prostatectomy.   Final Diagnosis   1.  Prostate, radical prostatectomy:  Prostatic adenocarcinoma, Clarence score 7 (3+4)  Prostatic adenocarcinoma is on both the right and left lobes of the prostate  Prostatic adenocarcinoma is confined to the prostate gland  Tumor encompasses 20% of the prostate gland.  Bilateral seminal vesicles and vas deferens are negative for prostatic adenocarcinoma.     2.  Lymph nodes, right and left obturator, lymph node dissection:  Six lymph nodes, negative for metastatic carcinoma (0/6)     AJCC cancer stage: pT2, pN0   Electronically signed by Esthela Mcdonald MD on 11/14/2018 at 1546       Other issues to be addressed at this visit:   -Erectile dysfunction-he has been using low-dose sildenafil with minimal results.  His елена score would essentially be 5.  He is interested in considering other options.        Lab Results   Component Value Date    PSA <0.070 03/21/2019    PSA <0.070 12/21/2018    PSA 8.610 (H) 07/05/2018       The  following portions of the patient's history were reviewed and updated as appropriate: allergies, current medications, past family history, past medical history, past social history, past surgical history and problem list.      Review of Systems   Constitutional: Negative for chills and fever.   Gastrointestinal: Negative for abdominal pain, anal bleeding and blood in stool.   Genitourinary: Negative for dysuria, frequency, hematuria and urgency.           Current Outpatient Medications:   •  aspirin 81 MG EC tablet, Take 81 mg by mouth Daily., Disp: , Rfl:   •  azithromycin (ZITHROMAX) 500 MG tablet, Take 500 mg by mouth 3 (Three) Times a Week. Monday, Wednesday, Friday, Disp: , Rfl:   •  coenzyme Q10 100 MG capsule, Take 100 mg by mouth Daily., Disp: , Rfl:   •  ethambutol (MYAMBUTOL) 400 MG tablet, Take 2,000 mg by mouth 3 (Three) Times a Week. Monday, Wednesday, Friday, Disp: , Rfl:   •  fexofenadine (ALLEGRA) 180 MG tablet, Take 180 mg by mouth Daily., Disp: , Rfl:   •  hydrochlorothiazide (HYDRODIURIL) 25 MG tablet, Take 25 mg by mouth Daily., Disp: , Rfl:   •  irbesartan (AVAPRO) 300 MG tablet, Take 300 mg by mouth Daily., Disp: , Rfl:   •  Multiple Vitamins-Minerals (VISION-KATARINA PRESERVE PO), Take 1 tablet by mouth Daily., Disp: , Rfl:   •  rifAMPin (RIFADIN) 300 MG capsule, Take 600 mg by mouth 3 (Three) Times a Week. Monday, Wednesday, Friday, Disp: , Rfl:   •  rosuvastatin (CRESTOR) 10 MG tablet, Take 10 mg by mouth Daily., Disp: , Rfl:   •  sildenafil (REVATIO) 20 MG tablet, Take 1 tablet daily., Disp: 30 tablet, Rfl: 11    Past Medical History:   Diagnosis Date   • Arthritis    • Colon polyps    • High cholesterol    • Hypertension    • Mycobacterium avium complex (CMS/HCC)    • PSA elevation    • Seasonal allergies        Past Surgical History:   Procedure Laterality Date   • COLONOSCOPY     • HERNIA REPAIR      abdominal   • PROSTATE BIOPSY N/A 8/7/2018    Procedure: PROSTATE ULTRASOUND  URONAV FUSION  "BIOPSY;  Surgeon: Chemo Tierney MD;  Location:  [facility] OR;  Service: Urology   • PROSTATE ULTRASOUND BIOPSY  12/2016    negative   • PROSTATECTOMY N/A 11/13/2018    Procedure: PROSTATECTOMY LAPAROSCOPIC WITH DAVINCI SI ROBOT WITH PELVIC LYMPH NODE DISSECTION;  Surgeon: Chemo Tierney MD;  Location:  [facility] OR;  Service: [facility]   • ROTATOR CUFF REPAIR     • TONSILLECTOMY         Social History     Socioeconomic History   • Marital status:      Spouse name: Not on file   • Number of children: 2   • Years of education: Not on file   • Highest education level: Not on file   Occupational History   • Occupation: Human Resources     Comment: [employer]   Tobacco Use   • Smoking status: Never Smoker   • Smokeless tobacco: Never Used   Substance and Sexual Activity   • Alcohol use: Yes     Comment: OCC   • Drug use: No   • Sexual activity: Defer       Family History   Problem Relation Age of Onset   • No Known Problems Father    • No Known Problems Mother          /74   Temp 97.6 °F (36.4 °C)   Ht 175.3 cm (69\")   Wt 92.1 kg (203 lb)   BMI 29.98 kg/m²       Physical Exam  Constitutional:  They  appear well-developed and well-nourished. There are no obvious deformities. No distress.   Pulmonary/Chest: Effort normal.   GI: Soft. The patient exhibits no distension and no mass. There is no tenderness. There is no rebound and no guarding. No hernia.   Neurological: Patient is alert and oriented to person, place, and time.   Skin: Skin is warm and dry. Not diaphoretic.   Psychiatric:  normal mood and affect. Not agitated.         Data  Results for orders placed or performed in visit on 04/15/19   POC Urinalysis Dipstick, Multipro   Result Value Ref Range    Color Yellow Yellow, Straw, Dark Yellow, Soni    Clarity, UA Clear Clear    Glucose, UA Negative Negative, 1000 mg/dL (3+) mg/dL    Bilirubin Negative Negative    Ketones, UA Negative Negative    Specific Gravity  1.025 1.005 - 1.030    Blood, UA Negative " Negative    pH, Urine 6.0 5.0 - 8.0    Protein, POC Negative Negative mg/dL    Urobilinogen, UA Normal Normal    Nitrite, UA Negative Negative    Leukocytes Negative Negative        Patient's Body mass index is 29.98 kg/m². BMI is above normal parameters. Recommendations include: educational material.      Assessment and Plan  Diagnoses and all orders for this visit:    Prostate cancer (CMS/[facility])  -     POC Urinalysis Dipstick, Multipro  -     PSA DIAGNOSTIC; Future    Erectile dysfunction, unspecified erectile dysfunction type        -The PSA is <0.07 which is undetectable suggesting no clinical evidence of prostate cancer   -I am not optimistic that he is going to respond to phosphodiesterase 5 inhibitors which he was using prior to the procedure.  My recommendation would be to increase his sildenafil to 100 mg on a as needed basis as opposed to daily 20 mg dosage.  Today we discussed vacuum erection devices as well as intracavernosal pharmacologic injection.  I discussed with him doing this is a diagnostic procedure and then have him consider whether or not he would do this is a therapeutic approach.    (Please note that portions of this note were completed with a voice recognition program.)  Chemo Tierney MD  04/17/19  5:35 PM      Cc: Ozzie Feliz MD

## 2019-04-15 ENCOUNTER — OFFICE VISIT (OUTPATIENT)
Dept: UROLOGY | Facility: CLINIC | Age: 74
End: 2019-04-15

## 2019-04-15 VITALS
TEMPERATURE: 97.6 F | HEIGHT: 69 IN | SYSTOLIC BLOOD PRESSURE: 132 MMHG | DIASTOLIC BLOOD PRESSURE: 72 MMHG | WEIGHT: 203 LBS | BODY MASS INDEX: 30.07 KG/M2

## 2019-04-15 DIAGNOSIS — N52.9 ERECTILE DYSFUNCTION, UNSPECIFIED ERECTILE DYSFUNCTION TYPE: ICD-10-CM

## 2019-04-15 DIAGNOSIS — C61 PROSTATE CANCER (HCC): Primary | ICD-10-CM

## 2019-04-15 LAB
BILIRUB BLD-MCNC: NEGATIVE MG/DL
CLARITY, POC: CLEAR
COLOR UR: YELLOW
GLUCOSE UR STRIP-MCNC: NEGATIVE MG/DL
KETONES UR QL: NEGATIVE
LEUKOCYTE EST, POC: NEGATIVE
NITRITE UR-MCNC: NEGATIVE MG/ML
PH UR: 6 [PH] (ref 5–8)
PROT UR STRIP-MCNC: NEGATIVE MG/DL
RBC # UR STRIP: NEGATIVE /UL
SP GR UR: 1.02 (ref 1–1.03)
UROBILINOGEN UR QL: NORMAL

## 2019-04-15 PROCEDURE — 99214 OFFICE O/P EST MOD 30 MIN: CPT | Performed by: UROLOGY

## 2019-04-15 PROCEDURE — 81003 URINALYSIS AUTO W/O SCOPE: CPT | Performed by: UROLOGY

## 2019-04-15 NOTE — PATIENT INSTRUCTIONS

## 2019-04-17 ENCOUNTER — HOSPITAL ENCOUNTER (OUTPATIENT)
Dept: GENERAL RADIOLOGY | Facility: HOSPITAL | Age: 74
Discharge: HOME | End: 2019-04-17
Admitting: INTERNAL MEDICINE

## 2019-04-17 ENCOUNTER — TRANSCRIBE ORDERS (OUTPATIENT)
Dept: ADMINISTRATIVE | Facility: HOSPITAL | Age: 74
End: 2019-04-17

## 2019-04-17 DIAGNOSIS — A31.0 PULMONARY DISEASE DUE TO MYCOBACTERIA (HCC): Primary | ICD-10-CM

## 2019-04-17 PROCEDURE — 71046 X-RAY EXAM CHEST 2 VIEWS: CPT

## 2019-05-02 LAB
MYCOBACTERIUM SPEC CULT: ABNORMAL
NIGHT BLUE STAIN TISS: ABNORMAL
NIGHT BLUE STAIN TISS: ABNORMAL

## 2019-07-09 ENCOUNTER — RESULTS ENCOUNTER (OUTPATIENT)
Dept: UROLOGY | Facility: CLINIC | Age: 74
End: 2019-07-09

## 2019-07-09 DIAGNOSIS — C61 PROSTATE CANCER (HCC): ICD-10-CM

## 2019-07-11 DIAGNOSIS — C61 PROSTATE CANCER (HCC): Primary | ICD-10-CM

## 2019-07-11 LAB — PSA SERPL-MCNC: <0.014 NG/ML (ref 0–4)

## 2019-07-16 ENCOUNTER — RESULTS ENCOUNTER (OUTPATIENT)
Dept: UROLOGY | Facility: CLINIC | Age: 74
End: 2019-07-16

## 2019-07-16 DIAGNOSIS — C61 PROSTATE CANCER (HCC): ICD-10-CM

## 2019-07-17 NOTE — PROGRESS NOTES
Mr. Piedra is 74 y.o. male    CHIEF COMPLAINT: I am here to follow up on my prostate cancer. My PSA is <0.014.    HPI  Location: Prostate gland  Quality:  Adenocarcinoma  Severity: Intermediate risk disease with organ confinement.  He is currently in biochemical remission.  Duration: Diagnosed in August 2018 by UroNav MRI fusion biopsy.  Context: This was identified when he had a biopsy to evaluate elevated PSA which was obtained as part of prostate cancer screening.  Modifying factors: He underwent robot-assisted lap scopic prostatectomy which has resulted in biochemical remission.  Associated signs or symptoms: From a voiding standpoint he has 0 pad per day incontinence. He describes rare episode with stress. Occasional nocturnal leakage.  No issues.  Patient denies any possible systemic symptoms of prostate cancer such as weight loss, lower extremity edema, or skeletal pain that could be worrisome for metastases.  History related to this issue:   - 11/2018: Robot assisted laparoscopic prostatectomy.   Final Diagnosis   1.  Prostate, radical prostatectomy:  Prostatic adenocarcinoma, Sweetwater score 7 (3+4)  Prostatic adenocarcinoma is on both the right and left lobes of the prostate  Prostatic adenocarcinoma is confined to the prostate gland  Tumor encompasses 20% of the prostate gland.  Bilateral seminal vesicles and vas deferens are negative for prostatic adenocarcinoma.     2.  Lymph nodes, right and left obturator, lymph node dissection:  Six lymph nodes, negative for metastatic carcinoma (0/6)     AJCC cancer stage: pT2, pN0   Electronically signed by Esthela Mcdonald MD on 11/14/2018 at 1546     Lab Results   Component Value Date    PSA <0.014 07/11/2019    PSA <0.070 03/21/2019    PSA <0.070 12/21/2018     Other issues to be evaluated/managed today:  -He has erectile dysfunction.  He was having some trouble prior to prostatectomy but now is impotent.  He is unable to have a reaction with PDE 5 inhibitors.   Last visit we increased him to 100 mg sildenafil on a as needed basis which is resulted in no improvement.       The following portions of the patient's history were reviewed and updated as appropriate: allergies, current medications, past family history, past medical history, past social history, past surgical history and problem list.      Review of Systems   Constitutional: Negative for chills and fever.   Gastrointestinal: Negative for abdominal pain, anal bleeding and blood in stool.   Genitourinary: Negative for dysuria and hematuria.         Current Outpatient Medications:   •  aspirin 81 MG EC tablet, Take 81 mg by mouth Daily., Disp: , Rfl:   •  azithromycin (ZITHROMAX) 500 MG tablet, Take 500 mg by mouth 3 (Three) Times a Week. Monday, Wednesday, Friday, Disp: , Rfl:   •  coenzyme Q10 100 MG capsule, Take 100 mg by mouth Daily., Disp: , Rfl:   •  ethambutol (MYAMBUTOL) 400 MG tablet, Take 2,000 mg by mouth 3 (Three) Times a Week. Monday, Wednesday, Friday, Disp: , Rfl:   •  fexofenadine (ALLEGRA) 180 MG tablet, Take 180 mg by mouth Daily., Disp: , Rfl:   •  hydrochlorothiazide (HYDRODIURIL) 25 MG tablet, Take 25 mg by mouth Daily., Disp: , Rfl:   •  irbesartan (AVAPRO) 300 MG tablet, Take 300 mg by mouth Daily., Disp: , Rfl:   •  Multiple Vitamins-Minerals (VISION-KATARINA PRESERVE PO), Take 1 tablet by mouth Daily., Disp: , Rfl:   •  rifAMPin (RIFADIN) 300 MG capsule, Take 600 mg by mouth 3 (Three) Times a Week. Monday, Wednesday, Friday, Disp: , Rfl:   •  rosuvastatin (CRESTOR) 10 MG tablet, Take 10 mg by mouth Daily., Disp: , Rfl:   •  sildenafil (REVATIO) 20 MG tablet, Take 1 tablet daily., Disp: 30 tablet, Rfl: 11    Past Medical History:   Diagnosis Date   • Arthritis    • Colon polyps    • High cholesterol    • Hypertension    • Mycobacterium avium complex (CMS/HCC)    • PSA elevation    • Seasonal allergies        Past Surgical History:   Procedure Laterality Date   • COLONOSCOPY     • HERNIA REPAIR       abdominal   • PROSTATE BIOPSY N/A 8/7/2018    Procedure: PROSTATE ULTRASOUND  URONAV FUSION BIOPSY;  Surgeon: Chemo Tierney MD;  Location:  [facility] OR;  Service: Urology   • PROSTATE ULTRASOUND BIOPSY  12/2016    negative   • PROSTATECTOMY N/A 11/13/2018    Procedure: PROSTATECTOMY LAPAROSCOPIC WITH DAVINCI SI ROBOT WITH PELVIC LYMPH NODE DISSECTION;  Surgeon: Chemo Tierney MD;  Location:  [facility] OR;  Service: DaVinci   • ROTATOR CUFF REPAIR     • TONSILLECTOMY         Social History     Socioeconomic History   • Marital status:      Spouse name: Not on file   • Number of children: 2   • Years of education: Not on file   • Highest education level: Not on file   Occupational History   • Occupation: Human Resources     Comment: [employer]   Tobacco Use   • Smoking status: Never Smoker   • Smokeless tobacco: Never Used   Substance and Sexual Activity   • Alcohol use: Yes     Comment: OCC   • Drug use: No   • Sexual activity: Defer       Family History   Problem Relation Age of Onset   • No Known Problems Father    • No Known Problems Mother          There were no vitals taken for this visit.      Physical Exam  Constitutional:  They  appear well-developed and well-nourished. There are no obvious deformities. No distress. The vital signs are reviewed  Pulmonary/Chest: Effort normal.   GI: Soft. The patient exhibits no distension and no mass. There is no tenderness. There is no rebound and no guarding. No hernia.   Neurological: Patient is alert and oriented to person, place, and time.   Skin: Skin is warm and dry. Not diaphoretic.   Psychiatric:  normal mood and affect. Not agitated.         Data  Results for orders placed or performed in visit on 07/09/19   PSA DIAGNOSTIC   Result Value Ref Range    PSA <0.014 0.000 - 4.000 ng/mL         Imaging Results (last 7 days)     ** No results found for the last 168 hours. **          Patient's Body mass index is 29.98 kg/m². BMI is above normal parameters.  Recommendations include: educational material.      Assessment and Plan  Diagnoses and all orders for this visit:    Prostate cancer (CMS/[facility])  -     POC Urinalysis Dipstick, Multipro    Erectile dysfunction, unspecified erectile dysfunction type  -     POC Urinalysis Dipstick, Multipro      -His PSA is undetectable suggesting no clinical evidence of prostate cancer     -The patient has tried sildenafil without success. We discussed reasons these medications can fail after confirming that he took them appropriately. I discussed other options with him today including penile injections, vacuum erection devices, intraurethral alprostadil suppositories, and penile prosthetic options. I went over the mechanism of action of each of these. Satisfaction rates were discussed. We dicussed the possible adverse reactions and complications of these. He is reluctant to try PGE-1 injection.     F/U: 3 months with a PSa.      (Please note that portions of this note were completed with a voice recognition program.)  Kareem Flowers  07/17/19  10:29 AM

## 2019-07-18 ENCOUNTER — OFFICE VISIT (OUTPATIENT)
Dept: UROLOGY | Facility: CLINIC | Age: 74
End: 2019-07-18

## 2019-07-18 VITALS — BODY MASS INDEX: 30.07 KG/M2 | WEIGHT: 203 LBS | TEMPERATURE: 98 F | HEIGHT: 69 IN

## 2019-07-18 DIAGNOSIS — N52.9 ERECTILE DYSFUNCTION, UNSPECIFIED ERECTILE DYSFUNCTION TYPE: ICD-10-CM

## 2019-07-18 DIAGNOSIS — C61 PROSTATE CANCER (HCC): Primary | ICD-10-CM

## 2019-07-18 LAB
BILIRUB BLD-MCNC: NEGATIVE MG/DL
CLARITY, POC: CLEAR
COLOR UR: YELLOW
GLUCOSE UR STRIP-MCNC: NEGATIVE MG/DL
KETONES UR QL: ABNORMAL
LEUKOCYTE EST, POC: NEGATIVE
NITRITE UR-MCNC: NEGATIVE MG/ML
PH UR: 6 [PH] (ref 5–8)
PROT UR STRIP-MCNC: NEGATIVE MG/DL
RBC # UR STRIP: NEGATIVE /UL
SP GR UR: 1.02 (ref 1–1.03)
UROBILINOGEN UR QL: ABNORMAL

## 2019-07-18 PROCEDURE — 99213 OFFICE O/P EST LOW 20 MIN: CPT | Performed by: UROLOGY

## 2019-07-18 PROCEDURE — 81003 URINALYSIS AUTO W/O SCOPE: CPT | Performed by: UROLOGY

## 2019-07-18 NOTE — PATIENT INSTRUCTIONS

## 2019-09-03 ENCOUNTER — TRANSCRIBE ORDERS (OUTPATIENT)
Dept: ADMINISTRATIVE | Facility: HOSPITAL | Age: 74
End: 2019-09-03

## 2019-09-03 ENCOUNTER — HOSPITAL ENCOUNTER (OUTPATIENT)
Dept: GENERAL RADIOLOGY | Facility: HOSPITAL | Age: 74
Discharge: HOME | End: 2019-09-03
Admitting: INTERNAL MEDICINE

## 2019-09-03 DIAGNOSIS — A31.0 PULMONARY DISEASE DUE TO MYCOBACTERIA (HCC): Primary | ICD-10-CM

## 2019-09-03 PROCEDURE — 71046 X-RAY EXAM CHEST 2 VIEWS: CPT

## 2019-09-20 ENCOUNTER — TELEPHONE (OUTPATIENT)
Dept: UROLOGY | Facility: CLINIC | Age: 74
End: 2019-09-20

## 2019-10-10 LAB — PSA SERPL-MCNC: <0.014 NG/ML (ref 0–4)

## 2019-10-11 ENCOUNTER — RESULTS ENCOUNTER (OUTPATIENT)
Dept: UROLOGY | Facility: CLINIC | Age: 74
End: 2019-10-11

## 2019-10-11 DIAGNOSIS — C61 PROSTATE CANCER (HCC): ICD-10-CM

## 2019-11-04 NOTE — PROGRESS NOTES
Mr. Piedra is 74 y.o. male    CHIEF COMPLAINT: I am here for my 3 month follow up for prostate cancer. My PSA is <0.014.     HPI  Location: Prostate gland  Quality:  Adenocarcinoma  Severity: Intermediate risk disease with organ confinement.  He is currently in biochemical remission.  Duration: Diagnosed in August 2018 by UroNav MRI fusion biopsy.  Context: This was identified when he had a biopsy to evaluate elevated PSA which was obtained as part of prostate cancer screening.  Modifying factors: He underwent robot-assisted lap scopic prostatectomy which has resulted in biochemical remission.  Associated signs or symptoms: Voids with good stream. .  Patient denies any possible systemic symptoms of prostate cancer such as weight loss, lower extremity edema, or skeletal pain that could be worrisome for metastases.  History related to this issue:   - 11/2018: Robot assisted laparoscopic prostatectomy.   Final Diagnosis   1.  Prostate, radical prostatectomy:  Prostatic adenocarcinoma, Aliza score 7 (3+4)  Prostatic adenocarcinoma is on both the right and left lobes of the prostate  Prostatic adenocarcinoma is confined to the prostate gland  Tumor encompasses 20% of the prostate gland.  Bilateral seminal vesicles and vas deferens are negative for prostatic adenocarcinoma.     2.  Lymph nodes, right and left obturator, lymph node dissection:  Six lymph nodes, negative for metastatic carcinoma (0/6)     AJCC cancer stage: pT2, pN0   Electronically signed by Esthela Mcdonald MD on 11/14/2018 at 1546     Lab Results   Component Value Date    PSA <0.014 10/10/2019    PSA <0.014 07/11/2019    PSA <0.070 03/21/2019          Other issues to be evaluated/managed today:  -He has erectile dysfunction.  He had no response to maximum dose sildenafil     The following portions of the patient's history were reviewed and updated as appropriate: allergies, current medications, past family history, past medical history, past social  history, past surgical history and problem list.      Review of Systems   Constitutional: Negative for chills and fever.   Gastrointestinal: Negative for abdominal pain, anal bleeding and blood in stool.   Genitourinary: Negative for dysuria and hematuria.         Current Outpatient Medications:   •  aspirin 81 MG EC tablet, Take 81 mg by mouth Daily., Disp: , Rfl:   •  azithromycin (ZITHROMAX) 500 MG tablet, Take 500 mg by mouth 3 (Three) Times a Week. Monday, Wednesday, Friday, Disp: , Rfl:   •  coenzyme Q10 100 MG capsule, Take 100 mg by mouth Daily., Disp: , Rfl:   •  ethambutol (MYAMBUTOL) 400 MG tablet, Take 2,000 mg by mouth 3 (Three) Times a Week. Monday, Wednesday, Friday, Disp: , Rfl:   •  fexofenadine (ALLEGRA) 180 MG tablet, Take 180 mg by mouth Daily., Disp: , Rfl:   •  hydrochlorothiazide (HYDRODIURIL) 25 MG tablet, Take 25 mg by mouth Daily., Disp: , Rfl:   •  irbesartan (AVAPRO) 300 MG tablet, Take 300 mg by mouth Daily., Disp: , Rfl:   •  Multiple Vitamins-Minerals (VISION-KATARINA PRESERVE PO), Take 1 tablet by mouth Daily., Disp: , Rfl:   •  rifAMPin (RIFADIN) 300 MG capsule, Take 600 mg by mouth 3 (Three) Times a Week. Monday, Wednesday, Friday, Disp: , Rfl:   •  rosuvastatin (CRESTOR) 10 MG tablet, Take 10 mg by mouth Daily., Disp: , Rfl:   •  sildenafil (REVATIO) 20 MG tablet, Take 1 tablet daily., Disp: 30 tablet, Rfl: 11    Past Medical History:   Diagnosis Date   • Arthritis    • Colon polyps    • High cholesterol    • Hypertension    • Mycobacterium avium complex (CMS/HCC)    • PSA elevation    • Seasonal allergies        Past Surgical History:   Procedure Laterality Date   • COLONOSCOPY     • HERNIA REPAIR      abdominal   • PROSTATE BIOPSY N/A 8/7/2018    Procedure: PROSTATE ULTRASOUND  URONAV FUSION BIOPSY;  Surgeon: Chemo Tierney MD;  Location: [facility] OR;  Service: Urology   • PROSTATE ULTRASOUND BIOPSY  12/2016    negative   • PROSTATECTOMY N/A 11/13/2018    Procedure: PROSTATECTOMY  "LAPAROSCOPIC WITH DAVINCI SI ROBOT WITH PELVIC LYMPH NODE DISSECTION;  Surgeon: Chemo Tierney MD;  Location: [facility] OR;  Service: DaVinci   • ROTATOR CUFF REPAIR     • TONSILLECTOMY         Social History     Socioeconomic History   • Marital status:      Spouse name: Not on file   • Number of children: 2   • Years of education: Not on file   • Highest education level: Not on file   Occupational History   • Occupation: Human Resources     Comment: [employer]   Tobacco Use   • Smoking status: Never Smoker   • Smokeless tobacco: Never Used   Substance and Sexual Activity   • Alcohol use: Yes     Comment: OCC   • Drug use: No   • Sexual activity: Defer       Family History   Problem Relation Age of Onset   • No Known Problems Father    • No Known Problems Mother      Temp 98 °F (36.7 °C)   Ht 175.3 cm (69\")   Wt 92.1 kg (203 lb)   BMI 29.98 kg/m²       Physical Exam  Constitutional: Patient is without distress or deformity.  Vital signs are reviewed as above.    Neuro: No confusion; No disorientation; Alert and oriented  Pulmonary: No respiratory distress.   Skin: No pallor or diaphoresis        Data  Results for orders placed or performed in visit on 11/05/19   POC Urinalysis Dipstick, Multipro   Result Value Ref Range    Color Yellow Yellow, Straw, Dark Yellow, Soni    Clarity, UA Clear Clear    Glucose, UA Negative Negative, 1000 mg/dL (3+) mg/dL    Bilirubin Negative Negative    Ketones, UA Negative Negative    Specific Gravity  1.020 1.005 - 1.030    Blood, UA Negative Negative    pH, Urine 7.0 5.0 - 8.0    Protein,  mg/dL (A) Negative mg/dL    Urobilinogen, UA Normal Normal    Nitrite, UA Negative Negative    Leukocytes Negative Negative     Assessment and Plan  Diagnoses and all orders for this visit:    Prostate cancer (CMS/[facility])  -     POC Urinalysis Dipstick, Multipro    Erectile dysfunction, unspecified erectile dysfunction type  -     POC Urinalysis Dipstick, Multipro    His PSA is " undetectable suggesting no clinical evidence of prostate cancer   Other ED options are discussed. He is not interested intracavernosal injection, vacuum erection devices or penile prosthetics.    (Please note that portions of this note were completed with a voice recognition program.)  Chemo Tierney MD  11/05/19  11:18 AM

## 2019-11-05 ENCOUNTER — OFFICE VISIT (OUTPATIENT)
Dept: UROLOGY | Facility: CLINIC | Age: 74
End: 2019-11-05

## 2019-11-05 VITALS — WEIGHT: 203 LBS | HEIGHT: 69 IN | TEMPERATURE: 98 F | BODY MASS INDEX: 30.07 KG/M2

## 2019-11-05 DIAGNOSIS — C61 PROSTATE CANCER (HCC): Primary | ICD-10-CM

## 2019-11-05 DIAGNOSIS — N52.9 ERECTILE DYSFUNCTION, UNSPECIFIED ERECTILE DYSFUNCTION TYPE: ICD-10-CM

## 2019-11-05 LAB
BILIRUB BLD-MCNC: NEGATIVE MG/DL
CLARITY, POC: CLEAR
COLOR UR: YELLOW
GLUCOSE UR STRIP-MCNC: NEGATIVE MG/DL
KETONES UR QL: NEGATIVE
LEUKOCYTE EST, POC: NEGATIVE
NITRITE UR-MCNC: NEGATIVE MG/ML
PH UR: 7 [PH] (ref 5–8)
PROT UR STRIP-MCNC: ABNORMAL MG/DL
RBC # UR STRIP: NEGATIVE /UL
SP GR UR: 1.02 (ref 1–1.03)
UROBILINOGEN UR QL: NORMAL

## 2019-11-05 PROCEDURE — 99213 OFFICE O/P EST LOW 20 MIN: CPT | Performed by: UROLOGY

## 2019-11-05 PROCEDURE — 81003 URINALYSIS AUTO W/O SCOPE: CPT | Performed by: UROLOGY

## 2020-02-04 ENCOUNTER — TRANSCRIBE ORDERS (OUTPATIENT)
Dept: ADMINISTRATIVE | Facility: HOSPITAL | Age: 75
End: 2020-02-04

## 2020-02-04 DIAGNOSIS — R74.8 ABNORMAL LEVELS OF OTHER SERUM ENZYMES: Primary | ICD-10-CM

## 2020-02-10 ENCOUNTER — HOSPITAL ENCOUNTER (OUTPATIENT)
Dept: NUCLEAR MEDICINE | Facility: HOSPITAL | Age: 75
Discharge: HOME | End: 2020-02-10

## 2020-02-10 DIAGNOSIS — R74.8 ABNORMAL LEVELS OF OTHER SERUM ENZYMES: ICD-10-CM

## 2020-02-10 PROCEDURE — 78306 BONE IMAGING WHOLE BODY: CPT

## 2020-02-10 PROCEDURE — 0 TECHNETIUM OXIDRONATE KIT: Performed by: INTERNAL MEDICINE

## 2020-02-10 PROCEDURE — A9561 TC99M OXIDRONATE: HCPCS | Performed by: INTERNAL MEDICINE

## 2020-02-10 RX ADMIN — TECHNETIUM TC 99M OXIDRONATE 1 DOSE: 3.15 INJECTION, POWDER, LYOPHILIZED, FOR SOLUTION INTRAVENOUS at 10:46

## 2020-02-27 DIAGNOSIS — C61 PROSTATE CANCER (HCC): Primary | ICD-10-CM

## 2020-03-03 ENCOUNTER — RESULTS ENCOUNTER (OUTPATIENT)
Dept: UROLOGY | Facility: CLINIC | Age: 75
End: 2020-03-03

## 2020-03-03 DIAGNOSIS — C61 PROSTATE CANCER (HCC): ICD-10-CM

## 2020-03-03 LAB — PSA SERPL-MCNC: <0.014 NG/ML (ref 0–4)

## 2020-03-04 NOTE — PROGRESS NOTES
Mr. Piedra is 74 y.o. male    CHIEF COMPLAINT: I am here for follow up on prostate cancer. My PSA is <0.014    HPI  The location, severity of disease, quality, duration of onset, and treatment initiated are reviewed. This is unchanged from last visit except where updated as need. His interval history is as follows:     Minimal incontinence.  Says he has a feeling of incontinence sometime with heavy lifting but stream is good no hematuria.  He has no weight loss or lower extremity edema.  No musculoskeletal discomfort that would be worrisome for metastatic disease noted.  Erections continue to be a problem.  Sildenafil is not work for him.  He does not want to try penile injections and let her go through a surgical procedure for implant.    Location: Prostate gland  Quality:  Adenocarcinoma  Severity: Intermediate risk disease with organ confinement.  He is currently in biochemical remission.  Duration: Diagnosed in August 2018 by UroNav MRI fusion biopsy.  Context: This was identified when he had a biopsy to evaluate elevated PSA which was obtained as part of prostate cancer screening.  Modifying factors: He underwent robot-assisted lap scopic prostatectomy which has resulted in biochemical remission.    History related to this issue:   - 11/2018: Robot assisted laparoscopic prostatectomy.   Final Diagnosis   1.  Prostate, radical prostatectomy:  Prostatic adenocarcinoma, Aliza score 7 (3+4)  Prostatic adenocarcinoma is on both the right and left lobes of the prostate  Prostatic adenocarcinoma is confined to the prostate gland  Tumor encompasses 20% of the prostate gland.  Bilateral seminal vesicles and vas deferens are negative for prostatic adenocarcinoma.     2.  Lymph nodes, right and left obturator, lymph node dissection:  Six lymph nodes, negative for metastatic carcinoma (0/6)     AJCC cancer stage: pT2, pN0   Electronically signed by Esthela Mcdonald MD on 11/14/2018 at 1546            Lab Results    Component Value Date     PSA <0.014 10/10/2019     PSA <0.014 07/11/2019     PSA <0.070 03/21/2019         Lab Results   Component Value Date    PSA <0.014 03/02/2020    PSA <0.014 10/10/2019    PSA <0.014 07/11/2019              The following portions of the patient's history were reviewed and updated as appropriate: allergies, current medications, past family history, past medical history, past social history, past surgical history and problem list.      Review of Systems   Constitutional: Negative for chills and fever.   Gastrointestinal: Negative for abdominal pain, anal bleeding and blood in stool.   Genitourinary: Negative for dysuria, frequency, hematuria and urgency.         Current Outpatient Medications:   •  aspirin 81 MG EC tablet, Take 81 mg by mouth Daily., Disp: , Rfl:   •  azithromycin (ZITHROMAX) 500 MG tablet, Take 500 mg by mouth 3 (Three) Times a Week. Monday, Wednesday, Friday, Disp: , Rfl:   •  coenzyme Q10 100 MG capsule, Take 100 mg by mouth Daily., Disp: , Rfl:   •  ethambutol (MYAMBUTOL) 400 MG tablet, Take 2,000 mg by mouth 3 (Three) Times a Week. Monday, Wednesday, Friday, Disp: , Rfl:   •  fexofenadine (ALLEGRA) 180 MG tablet, Take 180 mg by mouth Daily., Disp: , Rfl:   •  hydrochlorothiazide (HYDRODIURIL) 25 MG tablet, Take 25 mg by mouth Daily., Disp: , Rfl:   •  irbesartan (AVAPRO) 300 MG tablet, Take 300 mg by mouth Daily., Disp: , Rfl:   •  Multiple Vitamins-Minerals (VISION-KATARINA PRESERVE PO), Take 1 tablet by mouth Daily., Disp: , Rfl:   •  rifAMPin (RIFADIN) 300 MG capsule, Take 600 mg by mouth 3 (Three) Times a Week. Monday, Wednesday, Friday, Disp: , Rfl:   •  rosuvastatin (CRESTOR) 10 MG tablet, Take 10 mg by mouth Daily., Disp: , Rfl:   •  sildenafil (REVATIO) 20 MG tablet, Take 1 tablet daily., Disp: 30 tablet, Rfl: 11    Past Medical History:   Diagnosis Date   • Arthritis    • Colon polyps    • High cholesterol    • Hypertension    • Mycobacterium avium complex (CMS/HCC)   "  • Prostate CA (CMS/HCC)    • PSA elevation    • Seasonal allergies        Past Surgical History:   Procedure Laterality Date   • COLONOSCOPY     • HERNIA REPAIR      abdominal   • PROSTATE BIOPSY N/A 8/7/2018    Procedure: PROSTATE ULTRASOUND  URONAV FUSION BIOPSY;  Surgeon: Chemo Tierney MD;  Location:  [facility] OR;  Service: Urology   • PROSTATE ULTRASOUND BIOPSY  12/2016    negative   • PROSTATECTOMY N/A 11/13/2018    Procedure: PROSTATECTOMY LAPAROSCOPIC WITH DAVINCI SI ROBOT WITH PELVIC LYMPH NODE DISSECTION;  Surgeon: Chemo Tierney MD;  Location:  [facility] OR;  Service: DaVinci   • ROTATOR CUFF REPAIR     • TONSILLECTOMY         Social History     Socioeconomic History   • Marital status:      Spouse name: Not on file   • Number of children: 2   • Years of education: Not on file   • Highest education level: Not on file   Occupational History   • Occupation: Human Resources     Comment: [employer]   Tobacco Use   • Smoking status: Never Smoker   • Smokeless tobacco: Never Used   Substance and Sexual Activity   • Alcohol use: Yes     Comment: OCC   • Drug use: No   • Sexual activity: Defer       Family History   Problem Relation Age of Onset   • No Known Problems Father    • No Known Problems Mother          Temp 98 °F (36.7 °C)   Ht 172.7 cm (68\")   Wt 93.1 kg (205 lb 3.2 oz)   BMI 31.20 kg/m²       Physical Exam  Constitutional:  They  appear well-developed and well-nourished. There are no obvious deformities. No distress. The vital signs are reviewed  Pulmonary/Chest: Effort normal.   GI: Soft. The patient exhibits no distension and no mass. There is no tenderness. There is no rebound and no guarding. No hernia.   Neurological: Patient is alert and oriented to person, place, and time.   Skin: Skin is warm and dry. Not diaphoretic.   Psychiatric:  normal mood and affect. Not agitated.       Data  Results for orders placed or performed in visit on 03/05/20   POC Urinalysis Dipstick, Multipro   Result " Value Ref Range    Color Yellow Yellow, Straw, Dark Yellow, Soni    Clarity, UA Clear Clear    Glucose, UA Negative Negative, 1000 mg/dL (3+) mg/dL    Bilirubin Negative Negative    Ketones, UA Negative Negative    Specific Gravity  1.025 1.005 - 1.030    Blood, UA Negative Negative    pH, Urine 6.5 5.0 - 8.0    Protein, POC Negative Negative mg/dL    Urobilinogen, UA Normal Normal    Nitrite, UA Negative Negative    Leukocytes Negative Negative         Imaging Results (Last 7 Days)     ** No results found for the last 168 hours. **            Assessment and Plan  Diagnoses and all orders for this visit:    Prostate cancer (CMS/[facility])  -     POC Urinalysis Dipstick, Multipro    Erectile dysfunction, unspecified erectile dysfunction type      His PSA is undetectable suggesting no clinical evidence of prostate cancer   I I am going to start him on intraurethral prostaglandin gel.  This is discussed and will be ordered through [facility].  F/U: 4 months with a PSA.      (Please note that portions of this note were completed with a voice recognition program.)  Chemo Tierney MD  03/07/20  12:17 PM

## 2020-03-05 ENCOUNTER — OFFICE VISIT (OUTPATIENT)
Dept: UROLOGY | Facility: CLINIC | Age: 75
End: 2020-03-05

## 2020-03-05 VITALS — WEIGHT: 205.2 LBS | BODY MASS INDEX: 31.1 KG/M2 | TEMPERATURE: 98 F | HEIGHT: 68 IN

## 2020-03-05 DIAGNOSIS — C61 PROSTATE CANCER (HCC): Primary | ICD-10-CM

## 2020-03-05 DIAGNOSIS — N52.9 ERECTILE DYSFUNCTION, UNSPECIFIED ERECTILE DYSFUNCTION TYPE: ICD-10-CM

## 2020-03-05 LAB
BILIRUB BLD-MCNC: NEGATIVE MG/DL
CLARITY, POC: CLEAR
COLOR UR: YELLOW
GLUCOSE UR STRIP-MCNC: NEGATIVE MG/DL
KETONES UR QL: NEGATIVE
LEUKOCYTE EST, POC: NEGATIVE
NITRITE UR-MCNC: NEGATIVE MG/ML
PH UR: 6.5 [PH] (ref 5–8)
PROT UR STRIP-MCNC: NEGATIVE MG/DL
RBC # UR STRIP: NEGATIVE /UL
SP GR UR: 1.02 (ref 1–1.03)
UROBILINOGEN UR QL: NORMAL

## 2020-03-05 PROCEDURE — 81003 URINALYSIS AUTO W/O SCOPE: CPT | Performed by: UROLOGY

## 2020-03-05 PROCEDURE — 99213 OFFICE O/P EST LOW 20 MIN: CPT | Performed by: UROLOGY

## 2020-03-10 ENCOUNTER — HOSPITAL ENCOUNTER (OUTPATIENT)
Dept: GENERAL RADIOLOGY | Facility: HOSPITAL | Age: 75
Discharge: HOME | End: 2020-03-10
Admitting: INTERNAL MEDICINE

## 2020-03-10 ENCOUNTER — TRANSCRIBE ORDERS (OUTPATIENT)
Dept: ADMINISTRATIVE | Facility: HOSPITAL | Age: 75
End: 2020-03-10

## 2020-03-10 DIAGNOSIS — R05.9 COUGH: ICD-10-CM

## 2020-03-10 DIAGNOSIS — A31.0 PULMONARY DISEASE DUE TO MYCOBACTERIA (HCC): Primary | ICD-10-CM

## 2020-03-10 DIAGNOSIS — A31.0 PULMONARY DISEASE DUE TO MYCOBACTERIA (HCC): ICD-10-CM

## 2020-03-10 PROCEDURE — 71046 X-RAY EXAM CHEST 2 VIEWS: CPT

## 2020-06-30 DIAGNOSIS — C61 PROSTATE CANCER (HCC): Primary | ICD-10-CM

## 2020-06-30 DIAGNOSIS — C61 PROSTATE CANCER (HCC): ICD-10-CM

## 2020-07-01 LAB — PSA SERPL-MCNC: <0.014 NG/ML (ref 0–4)

## 2020-07-07 ENCOUNTER — OFFICE VISIT (OUTPATIENT)
Dept: UROLOGY | Facility: CLINIC | Age: 75
End: 2020-07-07

## 2020-07-07 VITALS — HEIGHT: 68 IN | BODY MASS INDEX: 30.01 KG/M2 | TEMPERATURE: 97.4 F | WEIGHT: 198 LBS

## 2020-07-07 DIAGNOSIS — N52.9 ERECTILE DYSFUNCTION, UNSPECIFIED ERECTILE DYSFUNCTION TYPE: ICD-10-CM

## 2020-07-07 DIAGNOSIS — C61 PROSTATE CANCER (HCC): Primary | ICD-10-CM

## 2020-07-07 LAB
BILIRUB BLD-MCNC: NEGATIVE MG/DL
CLARITY, POC: CLEAR
COLOR UR: YELLOW
GLUCOSE UR STRIP-MCNC: NEGATIVE MG/DL
KETONES UR QL: NEGATIVE
LEUKOCYTE EST, POC: NEGATIVE
NITRITE UR-MCNC: NEGATIVE MG/ML
PH UR: 6.5 [PH] (ref 5–8)
PROT UR STRIP-MCNC: NEGATIVE MG/DL
RBC # UR STRIP: NEGATIVE /UL
SP GR UR: 1.01 (ref 1–1.03)
UROBILINOGEN UR QL: NORMAL

## 2020-07-07 PROCEDURE — 81003 URINALYSIS AUTO W/O SCOPE: CPT | Performed by: UROLOGY

## 2020-07-07 PROCEDURE — 99213 OFFICE O/P EST LOW 20 MIN: CPT | Performed by: UROLOGY

## 2020-07-07 RX ORDER — HYDROCHLOROTHIAZIDE 25 MG/1
25 TABLET ORAL DAILY
COMMUNITY

## 2020-10-25 ENCOUNTER — RESULTS ENCOUNTER (OUTPATIENT)
Dept: UROLOGY | Facility: CLINIC | Age: 75
End: 2020-10-25

## 2020-10-25 DIAGNOSIS — C61 PROSTATE CANCER (HCC): ICD-10-CM

## 2020-11-02 NOTE — PROGRESS NOTES
Mr. Piedra is 75 y.o. male    CHIEF COMPLAINT: 4 month follow up for Prostate Cancer after R.A.L.P     HPI    The location, severity of disease, quality, duration of onset, and treatment initiated are reviewed. This is unchanged from last visit except where updated as need. His interval history is as follows:      Gets up twice at night but otherwise happy with urination. His stream is good most of the time. No blood in urine. He did have some early. . Patient denies any possible systemic symptoms of prostate cancer such as weight loss, lower extremity edema, or skeletal pain that could be worrisome for metastases.     Location: Prostate gland  Quality:  Adenocarcinoma  Severity: Intermediate risk disease with organ confinement.  He is currently in biochemical remission.  Duration: Diagnosed in August 2018 by UroNav MRI fusion biopsy.  Context: This was identified when he had a biopsy to evaluate elevated PSA which was obtained as part of prostate cancer screening.  Modifying factors: He underwent robot-assisted lap scopic prostatectomy which has resulted in biochemical remission.     History related to this issue:   - 11/2018: Robot assisted laparoscopic prostatectomy.   Final Diagnosis   1.  Prostate, radical prostatectomy:  Prostatic adenocarcinoma, [place] score 7 (3+4)  Prostatic adenocarcinoma is on both the right and left lobes of the prostate  Prostatic adenocarcinoma is confined to the prostate gland  Tumor encompasses 20% of the prostate gland.  Bilateral seminal vesicles and vas deferens are negative for prostatic adenocarcinoma.     2.  Lymph nodes, right and left obturator, lymph node dissection:  Six lymph nodes, negative for metastatic carcinoma (0/6)     AJCC cancer stage: pT2, pN0   Electronically signed by Esthela Mcdonald MD on 11/14/2018 at 1546              Lab Results   Component Value Date    PSA <0.014 11/02/2020    PSA <0.014 06/30/2020    PSA <0.014 03/02/2020     Other issues to be  evaluated/managed today:  -He continues to struggle with erections.  He has stopped trying to use the sildenafil.       The following portions of the patient's history were reviewed and updated as appropriate: allergies, current medications, past family history, past medical history, past social history, past surgical history and problem list.      Review of Systems   Constitutional: Negative for chills and fever.   Gastrointestinal: Negative for abdominal pain, anal bleeding and blood in stool.   Genitourinary: Negative for dysuria, frequency, hematuria and urgency.         Current Outpatient Medications:   •  aspirin 81 MG EC tablet, Take 81 mg by mouth Daily., Disp: , Rfl:   •  coenzyme Q10 100 MG capsule, Take 100 mg by mouth Daily., Disp: , Rfl:   •  hydroCHLOROthiazide (HYDRODIURIL) 25 MG tablet, hydrochlorothiazide 25 mg tablet  TAKE 1 TABLET EVERY DAY, Disp: , Rfl:   •  irbesartan (AVAPRO) 300 MG tablet, Take 300 mg by mouth Daily., Disp: , Rfl:   •  rosuvastatin (CRESTOR) 10 MG tablet, Take 10 mg by mouth Daily., Disp: , Rfl:   •  sildenafil (REVATIO) 20 MG tablet, Take 1 tablet daily., Disp: 30 tablet, Rfl: 11  •  azithromycin (ZITHROMAX) 500 MG tablet, Take 500 mg by mouth 3 (Three) Times a Week. Monday, Wednesday, Friday, Disp: , Rfl:   •  ethambutol (MYAMBUTOL) 400 MG tablet, Take 2,000 mg by mouth 3 (Three) Times a Week. Monday, Wednesday, Friday, Disp: , Rfl:   •  fexofenadine (ALLEGRA) 180 MG tablet, Take 180 mg by mouth Daily., Disp: , Rfl:   •  meloxicam (MOBIC) 7.5 MG tablet, meloxicam 7.5 mg tablet, Disp: , Rfl:   •  Multiple Vitamins-Minerals (VISION-KATARINA PRESERVE PO), Take 1 tablet by mouth Daily., Disp: , Rfl:   •  rifAMPin (RIFADIN) 300 MG capsule, Take 600 mg by mouth 3 (Three) Times a Week. Monday, Wednesday, Friday, Disp: , Rfl:     Past Medical History:   Diagnosis Date   • Arthritis    • Colon polyps    • High cholesterol    • Hypertension    • Mycobacterium avium complex (CMS/HCC)    •  "Prostate CA (CMS/HCC)    • PSA elevation    • Seasonal allergies        Past Surgical History:   Procedure Laterality Date   • COLONOSCOPY     • HERNIA REPAIR      abdominal   • PROSTATE BIOPSY N/A 8/7/2018    Procedure: PROSTATE ULTRASOUND  URONAV FUSION BIOPSY;  Surgeon: Chemo Tierney MD;  Location: [facility] OR;  Service: Urology   • PROSTATE ULTRASOUND BIOPSY  12/2016    negative   • PROSTATECTOMY N/A 11/13/2018    Procedure: PROSTATECTOMY LAPAROSCOPIC WITH DAVINCI SI ROBOT WITH PELVIC LYMPH NODE DISSECTION;  Surgeon: Chemo Tierney MD;  Location: [facility] OR;  Service: [facility]   • ROTATOR CUFF REPAIR     • TONSILLECTOMY         Social History     Socioeconomic History   • Marital status:      Spouse name: Not on file   • Number of children: 2   • Years of education: Not on file   • Highest education level: Not on file   Occupational History   • Occupation: Human Resources     Comment: [employer]   Tobacco Use   • Smoking status: Never Smoker   • Smokeless tobacco: Never Used   Substance and Sexual Activity   • Alcohol use: Yes     Comment: OCC   • Drug use: No   • Sexual activity: Defer       Family History   Problem Relation Age of Onset   • No Known Problems Father    • No Known Problems Mother          Temp 97.5 °F (36.4 °C)   Ht 172.7 cm (68\")   Wt 90.7 kg (200 lb)   BMI 30.41 kg/m²       Physical Exam  Constitutional: Patient is without distress or deformity.  Vital signs are reviewed as above.    Neuro: No confusion; No disorientation; Alert and oriented  Pulmonary: No respiratory distress.   Skin: No pallor or diaphoresis        Data  Results for orders placed or performed in visit on 10/25/20   PSA DIAGNOSTIC    Specimen: Blood   Result Value Ref Range    PSA <0.014 0.000 - 4.000 ng/mL       Assessment and Plan  Diagnoses and all orders for this visit:    1. Prostate cancer (CMS/HCC) (Primary)    2. Erectile dysfunction, unspecified erectile dysfunction type    -His PSA is <0.2 suggesting no " clinical evidence of prostate cancer at this time.   -His ED continues but he has failed PDE 5 inhibitors.         (Please note that portions of this note were completed with a voice recognition program.)  Chemo Tierney MD  11/09/20  13:35 CST

## 2020-11-03 LAB — PSA SERPL-MCNC: <0.014 NG/ML (ref 0–4)

## 2020-11-09 ENCOUNTER — OFFICE VISIT (OUTPATIENT)
Dept: UROLOGY | Facility: CLINIC | Age: 75
End: 2020-11-09

## 2020-11-09 VITALS — TEMPERATURE: 97.5 F | WEIGHT: 200 LBS | BODY MASS INDEX: 30.31 KG/M2 | HEIGHT: 68 IN

## 2020-11-09 DIAGNOSIS — C61 PROSTATE CANCER (HCC): Primary | ICD-10-CM

## 2020-11-09 DIAGNOSIS — N52.9 ERECTILE DYSFUNCTION, UNSPECIFIED ERECTILE DYSFUNCTION TYPE: ICD-10-CM

## 2020-11-09 PROCEDURE — 99213 OFFICE O/P EST LOW 20 MIN: CPT | Performed by: UROLOGY

## 2020-11-09 RX ORDER — MELOXICAM 7.5 MG/1
7.5 TABLET ORAL DAILY
COMMUNITY

## 2020-12-04 ENCOUNTER — TELEPHONE (OUTPATIENT)
Dept: UROLOGY | Facility: CLINIC | Age: 75
End: 2020-12-04

## 2020-12-04 NOTE — TELEPHONE ENCOUNTER
He actually wants to schedule a telehealth visit with Dr. Tierney to discuss. I spoke with him a few minutes ago.

## 2020-12-04 NOTE — TELEPHONE ENCOUNTER
PT CALLED, HAD PREVIOUSLY DISCUSSED ED SHOTS WITH DR HEATH AND HE WOULD LIKE TO INITIATE THIS. DO I MAKE AN APPT WITH [facility] OR?

## 2020-12-07 NOTE — TELEPHONE ENCOUNTER
He needs the telehealth to decide if he wants to do them. Then if he does, we will send the prescription to the RX. The medicine will be sent to him, then he wll call and schedule an appt to come in and be taught by Dr. Tierney to do them. It will have to be an early morning appt. Thank you

## 2021-01-11 NOTE — PROGRESS NOTES
"  Mr. Piedra is 75 y.o. male    CHIEF COMPLAINT: To Discuss Penile Injections.     This is a tele-visit done using the Video Call feature on the [employer].   Due to the current situation with the coronavirus, it is felt this patient would be most appropriately managed by tele-visit in attempt to limit their exposure risk. It is explained to the patient that I am unable to examine via video but can evaluate and manage them by history taken during the tele-visit.   The use of a video visit has been reviewed with the patient and verbal informed consent has been obtained.  I asked the patient \"You have chosen to receive care through a telehealth visit.  Do you consent to use a video/audio connection for your medical care today? Yes\" YES was their answer.   The use of video visit has been deemed medically necessary for the care of the patient.             HPI  Mr. Piedra gentleman I treat for prostate cancer.  Today's visit is over 2 other issues that have been ongoing for him as follows:  -Erectile dysfunction: He is not been successful being able to get erections since the surgery.  We have tried him on short and long-acting PDE 5 inhibitors.  Reluctant to try the penile injections.  We have also talked about penile implants.  He now says he would like to try penile injections.  He came in with multiple questions today related to this.  He has a slight bit of curvature.  His severity of his erection issues would be елена score of essentially 0.    -He continues to be bothered by nocturia this is was not an issue prior to the surgery for him.  He does not have sleep apnea nor risk factors for this.  He said he voids with a good stream most of the time and the symptoms are not present during the day.  The severity is getting up 4-5 times.  He does not have any dysuria or blood in the urine.    History related to prostate cancer:   - 11/2018: Robot assisted laparoscopic prostatectomy.   Final Diagnosis   1. "  Prostate, radical prostatectomy:  Prostatic adenocarcinoma, Aliza score 7 (3+4)  Prostatic adenocarcinoma is on both the right and left lobes of the prostate  Prostatic adenocarcinoma is confined to the prostate gland  Tumor encompasses 20% of the prostate gland.  Bilateral seminal vesicles and vas deferens are negative for prostatic adenocarcinoma.     2.  Lymph nodes, right and left obturator, lymph node dissection:  Six lymph nodes, negative for metastatic carcinoma (0/6)     AJCC cancer stage: pT2, pN0   Electronically signed by Esthela Mcdonald MD on 11/14/2018 at 1546                         Lab Results   Component Value Date     PSA <0.014 11/02/2020     PSA <0.014 06/30/2020     PSA <0.014 03/02/2020                        The following portions of the patient's history were reviewed and updated as appropriate: allergies, current medications, past family history, past medical history, past social history, past surgical history and problem list.      Review of Systems   Constitutional: Negative for chills and fever.   Gastrointestinal: Negative for abdominal pain, anal bleeding and blood in stool.   Genitourinary: Negative for dysuria and hematuria.         Current Outpatient Medications:   •  aspirin 81 MG EC tablet, Take 81 mg by mouth Daily., Disp: , Rfl:   •  coenzyme Q10 100 MG capsule, Take 100 mg by mouth Daily., Disp: , Rfl:   •  fexofenadine (ALLEGRA) 180 MG tablet, Take 180 mg by mouth Daily., Disp: , Rfl:   •  hydroCHLOROthiazide (HYDRODIURIL) 25 MG tablet, hydrochlorothiazide 25 mg tablet  TAKE 1 TABLET EVERY DAY, Disp: , Rfl:   •  irbesartan (AVAPRO) 300 MG tablet, Take 300 mg by mouth Daily., Disp: , Rfl:   •  meloxicam (MOBIC) 7.5 MG tablet, meloxicam 7.5 mg tablet, Disp: , Rfl:   •  rosuvastatin (CRESTOR) 10 MG tablet, Take 10 mg by mouth Daily., Disp: , Rfl:   •  azithromycin (ZITHROMAX) 500 MG tablet, Take 500 mg by mouth 3 (Three) Times a Week. Monday, Wednesday, Friday, Disp: , Rfl:   •   ethambutol (MYAMBUTOL) 400 MG tablet, Take 2,000 mg by mouth 3 (Three) Times a Week. Monday, Wednesday, Friday, Disp: , Rfl:   •  Multiple Vitamins-Minerals (VISION-KATARINA PRESERVE PO), Take 1 tablet by mouth Daily., Disp: , Rfl:   •  rifAMPin (RIFADIN) 300 MG capsule, Take 600 mg by mouth 3 (Three) Times a Week. Monday, Wednesday, Friday, Disp: , Rfl:   •  trospium (SANCTURA) 20 MG tablet, Take 1 tablet by mouth Every Night., Disp: 30 tablet, Rfl: 11    Past Medical History:   Diagnosis Date   • Arthritis    • Colon polyps    • High cholesterol    • Hypertension    • Mycobacterium avium complex (CMS/HCC)    • Prostate CA (CMS/HCC)    • PSA elevation    • Seasonal allergies        Past Surgical History:   Procedure Laterality Date   • COLONOSCOPY     • HERNIA REPAIR      abdominal   • PROSTATE BIOPSY N/A 8/7/2018    Procedure: PROSTATE ULTRASOUND  URONAV FUSION BIOPSY;  Surgeon: Chemo Tierney MD;  Location: [facility] OR;  Service: Urology   • PROSTATE ULTRASOUND BIOPSY  12/2016    negative   • PROSTATECTOMY N/A 11/13/2018    Procedure: PROSTATECTOMY LAPAROSCOPIC WITH DAVINCI SI ROBOT WITH PELVIC LYMPH NODE DISSECTION;  Surgeon: Chemo Tierney MD;  Location: [facility] OR;  Service: [facility]   • ROTATOR CUFF REPAIR     • TONSILLECTOMY         Social History     Socioeconomic History   • Marital status:      Spouse name: Not on file   • Number of children: 2   • Years of education: Not on file   • Highest education level: Not on file   Occupational History   • Occupation: Human Resources     Comment: [employer]   Tobacco Use   • Smoking status: Never Smoker   • Smokeless tobacco: Never Used   Substance and Sexual Activity   • Alcohol use: Yes     Comment: OCC   • Drug use: No   • Sexual activity: Defer       Family History   Problem Relation Age of Onset   • No Known Problems Father    • No Known Problems Mother          There were no vitals taken for this visit.      Physical Exam      Data  Results for orders placed  or performed in visit on 10/25/20   PSA DIAGNOSTIC    Specimen: Blood   Result Value Ref Range    PSA <0.014 0.000 - 4.000 ng/mL         Imaging Results (Last 7 Days)     ** No results found for the last 168 hours. **            Assessment and Plan  Diagnoses and all orders for this visit:    1. Erectile dysfunction, unspecified erectile dysfunction type (Primary)    2. Nocturia  -     trospium (SANCTURA) 20 MG tablet; Take 1 tablet by mouth Every Night.  Dispense: 30 tablet; Refill: 11    We discussed other options today.  He has opted for penile injection.   Today I discussed proper technique for injection as well as demonstrated the proper use and placement of the needle on the lateral aspect of the penis, avoiding both the nerves and the urethra.  I discussed the risks of this medication, including bleeding, infection, priapism, scarring (possibly leading to curvature of the penis and failure of medications), lack of effectiveness, and pain both during and after the injection.  The patient also understands that the injections can be purchased by prescription in the pharmacy or through our office.  I discussed with him that any injections purchased through our office will not covered by medicare or other private insurances.  He expressed and understanding, and was given an appointment for an upcoming injection.  This will be done for both diagnostic and possible therapeutic reasons.     With regard to his nocturia we did discuss some options.  I went ahead and recommended we try him on a nightly dose of trospium to see if he is having somewhat of an overactive bladder situation.  Since he does not have daytime symptoms I do not recommend a daytime dose of this.    F/U Plan   I will have him see David Mccormack PA-C to set up the penile injection and assess if trospium is doing anything for him.  He should see me in May 2021 with a PSA prior to visit to follow-up his prostate cancer.        (Please note that  portions of this note were completed with a voice recognition program.)  Chemo Tierney MD  01/16/21  12:56 CST

## 2021-01-14 ENCOUNTER — TELEMEDICINE (OUTPATIENT)
Dept: UROLOGY | Facility: CLINIC | Age: 76
End: 2021-01-14

## 2021-01-14 DIAGNOSIS — R35.1 NOCTURIA: ICD-10-CM

## 2021-01-14 DIAGNOSIS — N52.9 ERECTILE DYSFUNCTION, UNSPECIFIED ERECTILE DYSFUNCTION TYPE: Primary | ICD-10-CM

## 2021-01-14 PROCEDURE — 99441 PR PHYS/QHP TELEPHONE EVALUATION 5-10 MIN: CPT | Performed by: UROLOGY

## 2021-01-14 RX ORDER — TROSPIUM CHLORIDE 20 MG/1
20 TABLET, FILM COATED ORAL NIGHTLY
Qty: 30 TABLET | Refills: 11 | Status: SHIPPED | OUTPATIENT
Start: 2021-01-14 | End: 2021-01-19 | Stop reason: CLARIF

## 2021-01-15 ENCOUNTER — IMMUNIZATION (OUTPATIENT)
Dept: VACCINE CLINIC | Facility: HOSPITAL | Age: 76
End: 2021-01-15

## 2021-01-15 PROCEDURE — 91301 HC SARSCO02 VAC 100MCG/0.5ML IM: CPT | Performed by: OBSTETRICS & GYNECOLOGY

## 2021-01-15 PROCEDURE — 0011A: CPT | Performed by: OBSTETRICS & GYNECOLOGY

## 2021-01-19 ENCOUNTER — TELEPHONE (OUTPATIENT)
Dept: UROLOGY | Facility: CLINIC | Age: 76
End: 2021-01-19

## 2021-01-19 DIAGNOSIS — R35.1 NOCTURIA: Primary | ICD-10-CM

## 2021-01-19 NOTE — TELEPHONE ENCOUNTER
----- Message from Nabor Barriga MA sent at 1/18/2021  9:26 AM CST -----  Regarding: RE: Insurance Denied Trospium.  I did a PA on his Trospium and his insurance denied it. They suggested Oxybutynin, Toviaz, or Myrbetriq. Please advise?  ----- Message -----  From: Chemo Tierney MD  Sent: 1/16/2021   1:00 PM CST  To: Nabor Barriga MA, Nam Barriga, [facility] Rep  Subject: How he should follow up.                         He needs to appointment set up as follows:-Set him up to see David for penile injection after he calls us he received his medication.-He should see me in May 2021 with a PSA prior to the visit.  When you let him know about the appointment with me, you should remind him to call you back when he gets the prescription mailed to him from the injection company.

## 2021-01-19 NOTE — TELEPHONE ENCOUNTER
Patient's insurance denied to trospium.  Were to go ahead and put him on Myrbetriq because it is in their formulary.  We will go to give him a 25 mg dose to take at night.  Chemo Tierney MD  1/19/2021  16:45 CST

## 2021-01-20 NOTE — TELEPHONE ENCOUNTER
Pt called and notified about Myrbetriq. I asked pt if he had heard from [facility] and he said he got his medication yesterday. I explained to him that we will get him an appt with David, for an early am appt, for his injection visit. Pt verbalized understanding. Call was transferred to Praveen for scheduling.

## 2021-01-21 NOTE — PROGRESS NOTES
Subjective    Mr. Piedra is 75 y.o. male    Chief Complaint: Penile Injections    History of Present Illness  Erectile dysfunction/penile injection trial:  Patient presents to have penile injection trial in the office for erectile dysfunction.  Patient's had for few years patient had a robotic prostatectomy 11/13/2018 patient states the erectile dysfunction preceded that surgery but it has got worse.  Patient states he will get what he calls a partial firmness or response at times with oral medications but not enough for intercourse.  He had looked at other options and wanted to try the penile injections which he is here for today to have a penile injection instruction trial in the office.    The following portions of the patient's history were reviewed and updated as appropriate: allergies, current medications, past family history, past medical history, past social history, past surgical history and problem list.    Review of Systems   Constitutional: Negative for appetite change and fever.   HENT: Negative for hearing loss and sore throat.    Eyes: Negative for pain and redness.   Respiratory: Negative for cough and shortness of breath.    Cardiovascular: Negative for chest pain and leg swelling.   Gastrointestinal: Negative for anal bleeding, nausea and vomiting.   Endocrine: Negative for cold intolerance and heat intolerance.   Genitourinary: Negative for dysuria, flank pain, frequency, hematuria and urgency.   Musculoskeletal: Negative for joint swelling and myalgias.   Skin: Negative for color change and rash.   Allergic/Immunologic: Negative for immunocompromised state.   Neurological: Negative for dizziness and speech difficulty.   Hematological: Negative for adenopathy. Does not bruise/bleed easily.   Psychiatric/Behavioral: Negative for dysphoric mood and suicidal ideas.         Current Outpatient Medications:   •  aspirin 81 MG EC tablet, Take 81 mg by mouth Daily., Disp: , Rfl:   •  azithromycin  (ZITHROMAX) 500 MG tablet, Take 500 mg by mouth 3 (Three) Times a Week. Monday, Wednesday, Friday, Disp: , Rfl:   •  coenzyme Q10 100 MG capsule, Take 100 mg by mouth Daily., Disp: , Rfl:   •  ethambutol (MYAMBUTOL) 400 MG tablet, Take 2,000 mg by mouth 3 (Three) Times a Week. Monday, Wednesday, Friday, Disp: , Rfl:   •  fexofenadine (ALLEGRA) 180 MG tablet, Take 180 mg by mouth Daily., Disp: , Rfl:   •  hydroCHLOROthiazide (HYDRODIURIL) 25 MG tablet, hydrochlorothiazide 25 mg tablet  TAKE 1 TABLET EVERY DAY, Disp: , Rfl:   •  irbesartan (AVAPRO) 300 MG tablet, Take 300 mg by mouth Daily., Disp: , Rfl:   •  meloxicam (MOBIC) 7.5 MG tablet, meloxicam 7.5 mg tablet, Disp: , Rfl:   •  Mirabegron ER (Myrbetriq) 25 MG tablet sustained-release 24 hour 24 hr tablet, Take 1 tablet by mouth Every Night., Disp: 30 tablet, Rfl: 11  •  Multiple Vitamins-Minerals (VISION-KATARINA PRESERVE PO), Take 1 tablet by mouth Daily., Disp: , Rfl:   •  rifAMPin (RIFADIN) 300 MG capsule, Take 600 mg by mouth 3 (Three) Times a Week. Monday, Wednesday, Friday, Disp: , Rfl:   •  rosuvastatin (CRESTOR) 10 MG tablet, Take 10 mg by mouth Daily., Disp: , Rfl:     Past Medical History:   Diagnosis Date   • Arthritis    • Colon polyps    • High cholesterol    • Hypertension    • Mycobacterium avium complex (CMS/HCC)    • Prostate CA (CMS/HCC)    • PSA elevation    • Seasonal allergies        Past Surgical History:   Procedure Laterality Date   • COLONOSCOPY     • HERNIA REPAIR      abdominal   • PROSTATE BIOPSY N/A 8/7/2018    Procedure: PROSTATE ULTRASOUND  URONAV FUSION BIOPSY;  Surgeon: Chemo Tierney MD;  Location:  [facility] OR;  Service: Urology   • PROSTATE ULTRASOUND BIOPSY  12/2016    negative   • PROSTATECTOMY N/A 11/13/2018    Procedure: PROSTATECTOMY LAPAROSCOPIC WITH DAVINCI SI ROBOT WITH PELVIC LYMPH NODE DISSECTION;  Surgeon: Chemo Tierney MD;  Location:  [facility] OR;  Service: [facility]   • ROTATOR CUFF REPAIR     • TONSILLECTOMY    "      Social History     Socioeconomic History   • Marital status:      Spouse name: Not on file   • Number of children: 2   • Years of education: Not on file   • Highest education level: Not on file   Occupational History   • Occupation: Human Resources     Comment: [employer]   Tobacco Use   • Smoking status: Never Smoker   • Smokeless tobacco: Never Used   Substance and Sexual Activity   • Alcohol use: Yes     Comment: OCC   • Drug use: No   • Sexual activity: Defer       Family History   Problem Relation Age of Onset   • No Known Problems Father    • No Known Problems Mother        Objective    Temp 97.4 °F (36.3 °C) (Temporal)   Ht 175.3 cm (69\")   Wt 92.5 kg (204 lb)   BMI 30.13 kg/m²     Physical Exam  Vitals signs reviewed.   Constitutional:       General: He is not in acute distress.     Appearance: Normal appearance.   HENT:      Head: Normocephalic and atraumatic.      Right Ear: External ear normal.      Left Ear: External ear normal.      Nose: No congestion.   Eyes:      Conjunctiva/sclera: Conjunctivae normal.   Neck:      Comments: No obvious neck masses observed  Pulmonary:      Effort: Pulmonary effort is normal.   Genitourinary:     Comments: I was able to access his penile shaft there is some invaluable envelopment from my fat pad however I was able to find enough length of shaft in order to do the penile injection.  Neurological:      General: No focal deficit present.      Mental Status: He is alert and oriented to person, place, and time.   Psychiatric:         Mood and Affect: Mood normal.         Behavior: Behavior normal.             Results for orders placed or performed in visit on 10/25/20   PSA DIAGNOSTIC    Specimen: Blood   Result Value Ref Range    PSA <0.014 0.000 - 4.000 ng/mL     Assessment and Plan    Procedure:  Patient presents for penile injection he was placed on the table with his genitalia exposed.  I discussed how to inject with a needle with a I Showed Him Areas of " the Penis Not Injected to Include the Glans He Should Not Inject Straight down or on the Other Underside of the Penis.  Avoid the Tip in the Base and Instructed Him to Inject Midshaft at Approximately 90 Degree Angle.  I Had PGE 40 I Injected 0.15 Cc without Difficulty Patient States There Is Minimal Pain There Is No Bleeding.  After 10 Minutes I Entered the Room and Patient Had a Partial Fullness after the Injection However This Would Not Be Sufficient for [place].    Diagnoses and all orders for this visit:    1. Erectile dysfunction, unspecified erectile dysfunction type (Primary)    2. Prostate cancer (CMS/[facility])    Patient here for penile injection.  After 0.15 injection he had a soft partial erection I told him to increase to 0.25 mL of the PGE and I want him to call the office and sent a message to me to let us know if we need to adjust I would like to tell him how to adjust the dosage amounts rather than him doing it.  Otherwise he will follow-up as scheduled with Dr. Tierney for his prostate cancer.

## 2021-01-22 ENCOUNTER — PROCEDURE VISIT (OUTPATIENT)
Dept: UROLOGY | Facility: CLINIC | Age: 76
End: 2021-01-22

## 2021-01-22 VITALS — BODY MASS INDEX: 30.21 KG/M2 | TEMPERATURE: 97.4 F | HEIGHT: 69 IN | WEIGHT: 204 LBS

## 2021-01-22 DIAGNOSIS — N52.9 ERECTILE DYSFUNCTION, UNSPECIFIED ERECTILE DYSFUNCTION TYPE: Primary | ICD-10-CM

## 2021-01-22 DIAGNOSIS — C61 PROSTATE CANCER (HCC): ICD-10-CM

## 2021-01-22 PROCEDURE — 54235 NJX CORPORA CAVERNOSA RX AGT: CPT | Performed by: PHYSICIAN ASSISTANT

## 2021-02-01 ENCOUNTER — TELEPHONE (OUTPATIENT)
Dept: UROLOGY | Facility: CLINIC | Age: 76
End: 2021-02-01

## 2021-02-01 NOTE — TELEPHONE ENCOUNTER
Pt called stated that he tried going up to 0.25 mL on his Penile Injections but that dose is not working for him. Pt requested to speak back with David Mccormack PA-C about this.

## 2021-02-02 ENCOUNTER — TELEPHONE (OUTPATIENT)
Dept: UROLOGY | Facility: CLINIC | Age: 76
End: 2021-02-02

## 2021-02-02 NOTE — TELEPHONE ENCOUNTER
Pt would like a call back from David Mccormack PA-C about his penile injections since 2.5 mL did not work for him.

## 2021-02-12 ENCOUNTER — IMMUNIZATION (OUTPATIENT)
Dept: VACCINE CLINIC | Facility: HOSPITAL | Age: 76
End: 2021-02-12

## 2021-02-12 LAB — PSA SERPL-MCNC: 1.11 NG/ML

## 2021-02-12 PROCEDURE — 91301 HC SARSCO02 VAC 100MCG/0.5ML IM: CPT | Performed by: OBSTETRICS & GYNECOLOGY

## 2021-02-12 PROCEDURE — 0012A: CPT | Performed by: OBSTETRICS & GYNECOLOGY

## 2021-03-12 ENCOUNTER — TRANSCRIBE ORDERS (OUTPATIENT)
Dept: ADMINISTRATIVE | Facility: HOSPITAL | Age: 76
End: 2021-03-12

## 2021-03-12 ENCOUNTER — HOSPITAL ENCOUNTER (OUTPATIENT)
Dept: GENERAL RADIOLOGY | Facility: HOSPITAL | Age: 76
Discharge: HOME | End: 2021-03-12
Admitting: INTERNAL MEDICINE

## 2021-03-12 DIAGNOSIS — S83.206A TEAR OF MENISCUS OF RIGHT KNEE AS CURRENT INJURY, UNSPECIFIED MENISCUS, UNSPECIFIED TEAR TYPE, INITIAL ENCOUNTER: Primary | ICD-10-CM

## 2021-03-12 DIAGNOSIS — M18.0 OSTEOARTHRITIS OF CARPOMETACARPAL (CMC) JOINT OF BOTH THUMBS: Primary | ICD-10-CM

## 2021-03-12 PROCEDURE — 73130 X-RAY EXAM OF HAND: CPT

## 2021-03-17 ENCOUNTER — HOSPITAL ENCOUNTER (OUTPATIENT)
Dept: MRI IMAGING | Facility: HOSPITAL | Age: 76
Discharge: HOME | End: 2021-03-17
Admitting: INTERNAL MEDICINE

## 2021-03-17 DIAGNOSIS — S83.206A TEAR OF MENISCUS OF RIGHT KNEE AS CURRENT INJURY, UNSPECIFIED MENISCUS, UNSPECIFIED TEAR TYPE, INITIAL ENCOUNTER: ICD-10-CM

## 2021-03-17 PROCEDURE — 73721 MRI JNT OF LWR EXTRE W/O DYE: CPT

## 2021-04-12 ENCOUNTER — TELEPHONE (OUTPATIENT)
Dept: UROLOGY | Facility: CLINIC | Age: 76
End: 2021-04-12

## 2021-04-12 NOTE — TELEPHONE ENCOUNTER
Pt called to speak with David Mccormack PA-C about his ED medication and would like to just speak with the provider about his dosing

## 2021-04-13 ENCOUNTER — TELEPHONE (OUTPATIENT)
Dept: UROLOGY | Facility: CLINIC | Age: 76
End: 2021-04-13

## 2021-04-13 NOTE — TELEPHONE ENCOUNTER
I called patient and spoke with him regarding penile injection dosage.  He initially started 0.25 mL and got his partial erection but was inadequate for intercourse he had then increase to 0.4 mL.  Still same response as previous.  I advised him to try 0.55 mL and let us know how he does I recommend if this is not sufficient we can discuss other treatment options either increase the strength of the medication or consider other treatment options.

## 2021-05-04 DIAGNOSIS — C61 PROSTATE CANCER (HCC): Primary | ICD-10-CM

## 2021-05-04 DIAGNOSIS — C61 PROSTATE CANCER (HCC): ICD-10-CM

## 2021-05-05 LAB — PSA SERPL-MCNC: <0.014 NG/ML (ref 0–4)

## 2021-05-11 ENCOUNTER — OFFICE VISIT (OUTPATIENT)
Dept: UROLOGY | Facility: CLINIC | Age: 76
End: 2021-05-11

## 2021-05-11 VITALS — TEMPERATURE: 96.3 F | HEIGHT: 68 IN | BODY MASS INDEX: 29.95 KG/M2 | WEIGHT: 197.6 LBS

## 2021-05-11 DIAGNOSIS — C61 PROSTATE CANCER (HCC): Primary | ICD-10-CM

## 2021-05-11 DIAGNOSIS — N52.9 ERECTILE DYSFUNCTION, UNSPECIFIED ERECTILE DYSFUNCTION TYPE: ICD-10-CM

## 2021-05-11 DIAGNOSIS — R35.1 NOCTURIA: ICD-10-CM

## 2021-05-11 LAB
BILIRUB BLD-MCNC: NEGATIVE MG/DL
CLARITY, POC: CLEAR
COLOR UR: YELLOW
GLUCOSE UR STRIP-MCNC: NEGATIVE MG/DL
KETONES UR QL: ABNORMAL
LEUKOCYTE EST, POC: NEGATIVE
NITRITE UR-MCNC: NEGATIVE MG/ML
PH UR: 7 [PH] (ref 5–8)
PROT UR STRIP-MCNC: ABNORMAL MG/DL
RBC # UR STRIP: NEGATIVE /UL
SP GR UR: 1.02 (ref 1–1.03)
UROBILINOGEN UR QL: ABNORMAL

## 2021-05-11 PROCEDURE — 99214 OFFICE O/P EST MOD 30 MIN: CPT | Performed by: UROLOGY

## 2021-05-11 PROCEDURE — 81003 URINALYSIS AUTO W/O SCOPE: CPT | Performed by: UROLOGY

## 2021-09-30 ENCOUNTER — OFFICE VISIT (OUTPATIENT)
Dept: GASTROENTEROLOGY | Facility: CLINIC | Age: 76
End: 2021-09-30

## 2021-09-30 VITALS
HEIGHT: 60 IN | WEIGHT: 194 LBS | OXYGEN SATURATION: 98 % | SYSTOLIC BLOOD PRESSURE: 126 MMHG | DIASTOLIC BLOOD PRESSURE: 78 MMHG | TEMPERATURE: 98 F | BODY MASS INDEX: 38.09 KG/M2 | HEART RATE: 79 BPM

## 2021-09-30 DIAGNOSIS — Z86.010 HISTORY OF ADENOMATOUS POLYP OF COLON: Primary | ICD-10-CM

## 2021-09-30 PROBLEM — Z86.0101 HISTORY OF ADENOMATOUS POLYP OF COLON: Status: ACTIVE | Noted: 2021-09-30

## 2021-09-30 PROCEDURE — S0260 H&P FOR SURGERY: HCPCS | Performed by: NURSE PRACTITIONER

## 2021-09-30 RX ORDER — SODIUM, POTASSIUM,MAG SULFATES 17.5-3.13G
177 SOLUTION, RECONSTITUTED, ORAL ORAL TAKE AS DIRECTED
Qty: 177 ML | Refills: 0 | Status: ON HOLD | OUTPATIENT
Start: 2021-09-30 | End: 2021-10-19

## 2021-09-30 NOTE — PROGRESS NOTES
Chief Complaint   Patient presents with   • Colonoscopy     8-10-16 colon 5 year recall 5 polyps       PCP: Ozzie Feliz MD  REFER: No ref. provider found    Subjective     HPI    Karan Piedra is a 76 y.o. male who presents to office for preventative maintenance.  There is  a personal history of colon polyps.  There is not a history of colon cancer.  He does not have complaints of nausea/vomiting, change in bowels, weight loss, no BRBPR, no melena.  There is not a family history of colon cancer.  There is not a family history of colon polyps.  His last colonoscopy-2016 .  Bowels do move on regular basis.    CScope (Dr Jorgensen) 8/2016-tubular adenoma      Past Medical History:   Diagnosis Date   • Arthritis    • Colon polyps    • High cholesterol    • Hypertension    • Mycobacterium avium complex (CMS/HCC)    • Prostate CA (CMS/HCC)    • PSA elevation    • Seasonal allergies      Past Surgical History:   Procedure Laterality Date   • COLONOSCOPY  08/10/2016    five 5 mm polyps in the transverse colon,at the hepatic flexure and in the cecum  resected and retrieved   • HERNIA REPAIR      abdominal   • PROSTATE BIOPSY N/A 8/7/2018    Procedure: PROSTATE ULTRASOUND  URONAV FUSION BIOPSY;  Surgeon: Chemo Tierney MD;  Location:  [facility] OR;  Service: Urology   • PROSTATE ULTRASOUND BIOPSY  12/2016    negative   • PROSTATECTOMY N/A 11/13/2018    Procedure: PROSTATECTOMY LAPAROSCOPIC WITH DAVINCI SI ROBOT WITH PELVIC LYMPH NODE DISSECTION;  Surgeon: Chemo Tierney MD;  Location:  [facility] OR;  Service: DaVinci   • ROTATOR CUFF REPAIR     • TONSILLECTOMY       Outpatient Medications Marked as Taking for the 9/30/21 encounter (Office Visit) with Case Murdock APRN   Medication Sig Dispense Refill   • aspirin 81 MG EC tablet Take 81 mg by mouth Daily.     • hydroCHLOROthiazide (HYDRODIURIL) 25 MG tablet hydrochlorothiazide 25 mg tablet   TAKE 1 TABLET EVERY DAY     • irbesartan (AVAPRO) 300 MG tablet  Take 300 mg by mouth Daily.     • meloxicam (MOBIC) 7.5 MG tablet meloxicam 7.5 mg tablet     • rosuvastatin (CRESTOR) 10 MG tablet Take 10 mg by mouth Daily.       No Known Allergies  Social History     Socioeconomic History   • Marital status:      Spouse name: Not on file   • Number of children: 2   • Years of education: Not on file   • Highest education level: Not on file   Tobacco Use   • Smoking status: Never Smoker   • Smokeless tobacco: Never Used   Vaping Use   • Vaping Use: Never used   Substance and Sexual Activity   • Alcohol use: Yes     Comment: OCC   • Drug use: No   • Sexual activity: Defer     Review of Systems   Constitutional: Negative for unexpected weight change.   Respiratory: Negative for shortness of breath.    Cardiovascular: Negative for chest pain.   Gastrointestinal: Negative for abdominal pain and anal bleeding.     Objective   Vitals:    09/30/21 0944   BP: 126/78   Pulse: 79   Temp: 98 °F (36.7 °C)   SpO2: 98%     Physical Exam  Constitutional:       Appearance: Normal appearance. He is well-developed.   Eyes:      General: No scleral icterus.  Cardiovascular:      Rate and Rhythm: Regular rhythm.      Heart sounds: Normal heart sounds. No murmur heard.     Pulmonary:      Effort: Pulmonary effort is normal. No accessory muscle usage.      Breath sounds: Normal breath sounds.   Abdominal:      General: Bowel sounds are normal. There is no distension.      Palpations: Abdomen is soft. There is no mass.      Tenderness: There is no abdominal tenderness. There is no guarding or rebound.   Skin:     General: Skin is warm and dry.      Coloration: Skin is not jaundiced.   Neurological:      Mental Status: He is alert.   Psychiatric:         Behavior: Behavior is cooperative.       Imaging Results (Most Recent)     None        Body mass index is 37.89 kg/m².    Assessment/Plan   Diagnoses and all orders for this visit:    1. History of adenomatous polyp of colon (Primary)  -     Case  Request; Standing  -     Implement Anesthesia Orders Day of Procedure; Standing  -     Obtain Informed Consent; Standing  -     Case Request    Other orders  -     sodium-potassium-magnesium sulfates (Suprep Bowel Prep Kit) 17.5-3.13-1.6 GM/177ML solution oral solution; Take 1 bottle by mouth Take As Directed. Take as directed  Dispense: 177 mL; Refill: 0      COLONOSCOPY WITH ANESTHESIA (N/A)    Advised to hold Aspirin x 5 days prior to procedure     Advised pt to stop use of NSAIDs, Fish Oil, and MV 5 days prior to procedure, per Dr Jorgensen protocol.  Tylenol based products are ok to take.  Pt verbalized understanding.     All risks, benefits, alternatives, and indications of colonoscopy procedure have been discussed with the patient. Risks to include perforation of the colon requiring possible surgery or colostomy, risk of bleeding from biopsies or removal of colon tissue, possibility of missing a colon polyp or cancer, or adverse drug reaction.  Benefits to include the diagnosis and management of disease of the colon and rectum. Alternatives to include barium enema, radiographic evaluation, lab testing or no intervention. He verbalizes understanding and agrees.         Case Murdock, APRN  09/30/21        There are no Patient Instructions on file for this visit.

## 2021-10-19 ENCOUNTER — TELEPHONE (OUTPATIENT)
Dept: GASTROENTEROLOGY | Facility: CLINIC | Age: 76
End: 2021-10-19

## 2021-10-19 ENCOUNTER — ANESTHESIA (OUTPATIENT)
Dept: GASTROENTEROLOGY | Facility: HOSPITAL | Age: 76
End: 2021-10-19

## 2021-10-19 ENCOUNTER — ANESTHESIA EVENT (OUTPATIENT)
Dept: GASTROENTEROLOGY | Facility: HOSPITAL | Age: 76
End: 2021-10-19

## 2021-10-19 ENCOUNTER — PREP FOR SURGERY (OUTPATIENT)
Dept: OTHER | Facility: HOSPITAL | Age: 76
End: 2021-10-19

## 2021-10-19 ENCOUNTER — HOSPITAL ENCOUNTER (OUTPATIENT)
Facility: HOSPITAL | Age: 76
Setting detail: HOSPITAL OUTPATIENT SURGERY
Discharge: HOME | End: 2021-10-19
Attending: INTERNAL MEDICINE | Admitting: INTERNAL MEDICINE

## 2021-10-19 VITALS
BODY MASS INDEX: 29.1 KG/M2 | WEIGHT: 192 LBS | DIASTOLIC BLOOD PRESSURE: 59 MMHG | TEMPERATURE: 97.3 F | HEART RATE: 72 BPM | RESPIRATION RATE: 20 BRPM | HEIGHT: 68 IN | SYSTOLIC BLOOD PRESSURE: 106 MMHG | OXYGEN SATURATION: 98 %

## 2021-10-19 DIAGNOSIS — Z86.010 HISTORY OF ADENOMATOUS POLYP OF COLON: ICD-10-CM

## 2021-10-19 DIAGNOSIS — Z86.010 HISTORY OF ADENOMATOUS POLYP OF COLON: Primary | ICD-10-CM

## 2021-10-19 PROCEDURE — 88305 TISSUE EXAM BY PATHOLOGIST: CPT | Performed by: INTERNAL MEDICINE

## 2021-10-19 PROCEDURE — 25010000002 PROPOFOL 10 MG/ML EMULSION: Performed by: NURSE ANESTHETIST, CERTIFIED REGISTERED

## 2021-10-19 PROCEDURE — 45385 COLONOSCOPY W/LESION REMOVAL: CPT | Performed by: INTERNAL MEDICINE

## 2021-10-19 RX ORDER — SODIUM CHLORIDE 0.9 % (FLUSH) 0.9 %
10 SYRINGE (ML) INJECTION AS NEEDED
Status: DISCONTINUED | OUTPATIENT
Start: 2021-10-19 | End: 2021-10-19 | Stop reason: HOSPADM

## 2021-10-19 RX ORDER — LIDOCAINE HYDROCHLORIDE 20 MG/ML
INJECTION, SOLUTION EPIDURAL; INFILTRATION; INTRACAUDAL; PERINEURAL AS NEEDED
Status: DISCONTINUED | OUTPATIENT
Start: 2021-10-19 | End: 2021-10-19 | Stop reason: SURG

## 2021-10-19 RX ORDER — ROSUVASTATIN CALCIUM 20 MG/1
20 TABLET, COATED ORAL DAILY
COMMUNITY

## 2021-10-19 RX ORDER — SODIUM CHLORIDE 0.9 % (FLUSH) 0.9 %
10 SYRINGE (ML) INJECTION AS NEEDED
Status: CANCELLED | OUTPATIENT
Start: 2021-10-19

## 2021-10-19 RX ORDER — PROPOFOL 10 MG/ML
VIAL (ML) INTRAVENOUS AS NEEDED
Status: DISCONTINUED | OUTPATIENT
Start: 2021-10-19 | End: 2021-10-19 | Stop reason: SURG

## 2021-10-19 RX ORDER — SODIUM CHLORIDE 9 MG/ML
100 INJECTION, SOLUTION INTRAVENOUS CONTINUOUS
Status: CANCELLED | OUTPATIENT
Start: 2021-10-19

## 2021-10-19 RX ORDER — SODIUM CHLORIDE 0.9 % (FLUSH) 0.9 %
10 SYRINGE (ML) INJECTION EVERY 12 HOURS SCHEDULED
Status: CANCELLED | OUTPATIENT
Start: 2021-10-19

## 2021-10-19 RX ORDER — MIDAZOLAM HYDROCHLORIDE 1 MG/ML
0.5 INJECTION, SOLUTION INTRAMUSCULAR; INTRAVENOUS
Status: CANCELLED | OUTPATIENT
Start: 2021-10-19

## 2021-10-19 RX ORDER — SODIUM CHLORIDE 9 MG/ML
500 INJECTION, SOLUTION INTRAVENOUS CONTINUOUS PRN
Status: DISCONTINUED | OUTPATIENT
Start: 2021-10-19 | End: 2021-10-19 | Stop reason: HOSPADM

## 2021-10-19 RX ORDER — LIDOCAINE HYDROCHLORIDE 10 MG/ML
0.5 INJECTION, SOLUTION EPIDURAL; INFILTRATION; INTRACAUDAL; PERINEURAL ONCE AS NEEDED
Status: DISCONTINUED | OUTPATIENT
Start: 2021-10-19 | End: 2021-10-19 | Stop reason: HOSPADM

## 2021-10-19 RX ADMIN — PROPOFOL 280 MG: 10 INJECTION, EMULSION INTRAVENOUS at 08:18

## 2021-10-19 RX ADMIN — LIDOCAINE HYDROCHLORIDE 60 MG: 20 INJECTION, SOLUTION EPIDURAL; INFILTRATION; INTRACAUDAL; PERINEURAL at 08:18

## 2021-10-19 RX ADMIN — SODIUM CHLORIDE 500 ML: 9 INJECTION, SOLUTION INTRAVENOUS at 07:32

## 2021-10-19 NOTE — ANESTHESIA PREPROCEDURE EVALUATION
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  Cardiovascular   Exercise tolerance: good (4-7 METS)    ECG reviewed    (+) hypertension well controlled, hyperlipidemia,   (-) pacemaker, past MI, angina, cardiac stents      Neuro/Psych  (-) seizures, TIA, CVA  GI/Hepatic/Renal/Endo    (-) GERD, liver disease, no renal disease, diabetes    Musculoskeletal     Abdominal    Substance History      OB/GYN          Other   arthritis,    history of cancer (prostate)                      Anesthesia Plan    ASA 2     MAC     intravenous induction     Anesthetic plan, all risks, benefits, and alternatives have been provided, discussed and informed consent has been obtained with: patient and spouse/significant other.

## 2021-10-20 ENCOUNTER — ANESTHESIA (OUTPATIENT)
Dept: GASTROENTEROLOGY | Facility: HOSPITAL | Age: 76
End: 2021-10-20

## 2021-10-20 ENCOUNTER — HOSPITAL ENCOUNTER (OUTPATIENT)
Facility: HOSPITAL | Age: 76
Setting detail: HOSPITAL OUTPATIENT SURGERY
Discharge: HOME | End: 2021-10-20
Attending: INTERNAL MEDICINE | Admitting: INTERNAL MEDICINE

## 2021-10-20 ENCOUNTER — ANESTHESIA EVENT (OUTPATIENT)
Dept: GASTROENTEROLOGY | Facility: HOSPITAL | Age: 76
End: 2021-10-20

## 2021-10-20 VITALS
TEMPERATURE: 97.1 F | HEART RATE: 80 BPM | HEIGHT: 68 IN | BODY MASS INDEX: 28.79 KG/M2 | SYSTOLIC BLOOD PRESSURE: 100 MMHG | OXYGEN SATURATION: 95 % | RESPIRATION RATE: 20 BRPM | WEIGHT: 190 LBS | DIASTOLIC BLOOD PRESSURE: 62 MMHG

## 2021-10-20 PROCEDURE — 25010000002 PROPOFOL 10 MG/ML EMULSION: Performed by: NURSE ANESTHETIST, CERTIFIED REGISTERED

## 2021-10-20 PROCEDURE — G0105 COLORECTAL SCRN; HI RISK IND: HCPCS | Performed by: INTERNAL MEDICINE

## 2021-10-20 RX ORDER — SODIUM CHLORIDE 0.9 % (FLUSH) 0.9 %
10 SYRINGE (ML) INJECTION AS NEEDED
Status: DISCONTINUED | OUTPATIENT
Start: 2021-10-20 | End: 2021-10-20 | Stop reason: HOSPADM

## 2021-10-20 RX ORDER — SODIUM CHLORIDE 9 MG/ML
500 INJECTION, SOLUTION INTRAVENOUS CONTINUOUS PRN
Status: DISCONTINUED | OUTPATIENT
Start: 2021-10-20 | End: 2021-10-20 | Stop reason: HOSPADM

## 2021-10-20 RX ORDER — PROPOFOL 10 MG/ML
VIAL (ML) INTRAVENOUS AS NEEDED
Status: DISCONTINUED | OUTPATIENT
Start: 2021-10-20 | End: 2021-10-20 | Stop reason: SURG

## 2021-10-20 RX ADMIN — SODIUM CHLORIDE 500 ML: 9 INJECTION, SOLUTION INTRAVENOUS at 06:49

## 2021-10-20 RX ADMIN — PROPOFOL 100 MG: 10 INJECTION, EMULSION INTRAVENOUS at 07:30

## 2021-10-20 RX ADMIN — PROPOFOL 100 MG: 10 INJECTION, EMULSION INTRAVENOUS at 07:23

## 2021-10-20 NOTE — ANESTHESIA POSTPROCEDURE EVALUATION
Patient: Karan Piedra    Procedure Summary     Date: 10/20/21 Room / Location: [facility] ENDOSCOPY 4 / [facility]    Anesthesia Start: 0719 Anesthesia Stop: 0735    Procedure: COLONOSCOPY WITH ANESTHESIA (N/A ) Diagnosis:       History of adenomatous polyp of colon      (History of adenomatous polyp of colon [Z86.010])    Surgeons: Scotty Jorgensen DO Provider: David Carlisle CRNA    Anesthesia Type: MAC ASA Status: 2          Anesthesia Type: MAC    Vitals  Vitals Value Taken Time   BP     Temp     Pulse 79 10/20/21 0737   Resp     SpO2 93 % 10/20/21 0737   Vitals shown include unvalidated device data.        Post Anesthesia Care and Evaluation    Patient location during evaluation: PHASE II  Patient participation: complete - patient participated  Level of consciousness: awake and alert  Pain score: 0  Pain management: adequate  Airway patency: patent  Anesthetic complications: No anesthetic complications  PONV Status: none  Cardiovascular status: acceptable and stable  Respiratory status: acceptable  Hydration status: acceptable

## 2021-10-20 NOTE — ANESTHESIA PREPROCEDURE EVALUATION
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

## 2021-10-21 LAB
CYTO UR: NORMAL
LAB AP CASE REPORT: NORMAL
PATH REPORT.FINAL DX SPEC: NORMAL
PATH REPORT.GROSS SPEC: NORMAL

## 2021-11-03 ENCOUNTER — TELEPHONE (OUTPATIENT)
Dept: GASTROENTEROLOGY | Facility: CLINIC | Age: 76
End: 2021-11-03

## 2021-11-04 DIAGNOSIS — C61 PROSTATE CANCER (HCC): ICD-10-CM

## 2021-11-04 DIAGNOSIS — C61 PROSTATE CANCER (HCC): Primary | ICD-10-CM

## 2021-11-05 LAB — PSA SERPL-MCNC: <0.014 NG/ML (ref 0–4)

## 2021-11-11 ENCOUNTER — OFFICE VISIT (OUTPATIENT)
Dept: UROLOGY | Facility: CLINIC | Age: 76
End: 2021-11-11

## 2021-11-11 VITALS — BODY MASS INDEX: 29.7 KG/M2 | HEIGHT: 68 IN | WEIGHT: 196 LBS | TEMPERATURE: 98.7 F

## 2021-11-11 DIAGNOSIS — C61 PROSTATE CANCER (HCC): Primary | Chronic | ICD-10-CM

## 2021-11-11 DIAGNOSIS — N52.9 ERECTILE DYSFUNCTION, UNSPECIFIED ERECTILE DYSFUNCTION TYPE: Chronic | ICD-10-CM

## 2021-11-11 LAB
BILIRUB BLD-MCNC: NEGATIVE MG/DL
CLARITY, POC: CLEAR
COLOR UR: YELLOW
GLUCOSE UR STRIP-MCNC: NEGATIVE MG/DL
KETONES UR QL: NEGATIVE
LEUKOCYTE EST, POC: NEGATIVE
NITRITE UR-MCNC: NEGATIVE MG/ML
PH UR: 5.5 [PH] (ref 5–8)
PROT UR STRIP-MCNC: NEGATIVE MG/DL
RBC # UR STRIP: NEGATIVE /UL
SP GR UR: 1.01 (ref 1–1.03)
UROBILINOGEN UR QL: NORMAL

## 2021-11-11 PROCEDURE — 99213 OFFICE O/P EST LOW 20 MIN: CPT | Performed by: UROLOGY

## 2021-11-11 PROCEDURE — 81001 URINALYSIS AUTO W/SCOPE: CPT | Performed by: UROLOGY

## 2021-11-11 NOTE — PROGRESS NOTES
Chief Complaint  Prostate cancer    Subjective          Karan Piedra presents to [facility] for follow-up of adenocarcinoma prostate.Patient denies any possible systemic symptoms of prostate cancer such as weight loss, lower extremity edema, or skeletal pain that could be worrisome for systemic disease.  He underwent robot-assisted laparoscopic prostatectomy in November 2018 for favorable intermediate risk disease with negative margins Tissue Pathology Exam (11/13/2018 11:06)   Op Note by Chemo Tierney MD (11/13/2018 07:46)     He has erectile dysfunction.  Most recently tried intracavernosal injections.  Current dose of PGE1 40 mcg/phentolamine 0.5 mg/mL.  He uses 0.25 mL.  Really had difficulty with the response.  He is titrated upwards of 0.5 mL without success.      Current Outpatient Medications:   •  aspirin 81 MG EC tablet, Take 81 mg by mouth Daily., Disp: , Rfl:   •  fexofenadine (ALLEGRA) 180 MG tablet, Take 180 mg by mouth Daily., Disp: , Rfl:   •  hydroCHLOROthiazide (HYDRODIURIL) 25 MG tablet, hydrochlorothiazide 25 mg tablet  TAKE 1 TABLET EVERY DAY, Disp: , Rfl:   •  irbesartan (AVAPRO) 300 MG tablet, Take 300 mg by mouth Daily., Disp: , Rfl:   •  meloxicam (MOBIC) 7.5 MG tablet, meloxicam 7.5 mg tablet, Disp: , Rfl:   •  Multiple Vitamins-Minerals (VISION-KATARINA PRESERVE PO), Take 1 tablet by mouth Daily., Disp: , Rfl:   •  rosuvastatin (CRESTOR) 20 MG tablet, Take 20 mg by mouth Daily., Disp: , Rfl:   Past Medical History:   Diagnosis Date   • Arthritis    • Colon polyps    • High cholesterol    • Hypertension    • Mycobacterium avium complex (HCC)    • Prostate CA (HCC)    • PSA elevation    • Seasonal allergies      Past Surgical History:   Procedure Laterality Date   • COLONOSCOPY  08/10/2016    five 5 mm polyps in the transverse colon,at the hepatic flexure and in the cecum  resected and retrieved   • COLONOSCOPY N/A 10/19/2021    Procedure: COLONOSCOPY WITH  "ANESTHESIA;  Surgeon: Scotty Jorgensen DO;  Location:  [facility];  Service: Gastroenterology;  Laterality: N/A;  pre adenomatous polyp  post polyps  Ozzie Feliz MD   • COLONOSCOPY N/A 10/20/2021    Procedure: COLONOSCOPY WITH ANESTHESIA;  Surgeon: Scotty Jorgensen DO;  Location:  [facility];  Service: Gastroenterology;  Laterality: N/A;  pre: hx polyps  post: diverticulosis  Ozzie Feliz MD   • HERNIA REPAIR      abdominal   • PROSTATE BIOPSY N/A 8/7/2018    Procedure: PROSTATE ULTRASOUND  URONAV FUSION BIOPSY;  Surgeon: Chemo Tierney MD;  Location:  [facility] OR;  Service: Urology   • PROSTATE ULTRASOUND BIOPSY  12/2016    negative   • PROSTATECTOMY N/A 11/13/2018    Procedure: PROSTATECTOMY LAPAROSCOPIC WITH DAVINCI SI ROBOT WITH PELVIC LYMPH NODE DISSECTION;  Surgeon: Chemo Tierney MD;  Location:  [facility] OR;  Service: DaVinci   • ROTATOR CUFF REPAIR     • TONSILLECTOMY             Review  of systems  Constitutional: Negative for chills and fever.   Gastrointestinal: Negative for abdominal pain, anal bleeding and blood in stool.   Genitourinary: No fever or flank pain        Objective   PHYSICAL EXAM  Vital Signs:   Temp 98.7 °F (37.1 °C)   Ht 172.7 cm (68\")   Wt 88.9 kg (196 lb)   BMI 29.80 kg/m²     Constitutional: Patient is without distress or deformity.  Vital signs are reviewed as above.    Neuro: No confusion; No disorientation; Alert and oriented  Pulmonary: No respiratory distress.   Skin: No pallor or diaphoresis      DATA  Result Review :              Results for orders placed or performed in visit on 11/11/21   POC Urinalysis Dipstick, Multipro    Specimen: Urine   Result Value Ref Range    Color Yellow Yellow, Straw, Dark Yellow, Soni    Clarity, UA Clear Clear    Glucose, UA Negative Negative, 1000 mg/dL (3+) mg/dL    Bilirubin Negative Negative    Ketones, UA Negative Negative    Specific Gravity  1.015 1.005 - 1.030    Blood, UA Negative Negative    pH, " Urine 5.5 5.0 - 8.0    Protein, POC Negative Negative mg/dL    Urobilinogen, UA Normal Normal    Nitrite, UA Negative Negative    Leukocytes Negative Negative     Lab Results   Component Value Date    PSA <0.014 11/04/2021    PSA <0.014 05/04/2021    PSA 1.110 02/09/2021                    ASSESSMENT AND PLAN          Problem List Items Addressed This Visit        Hematology and Neoplasia    Prostate cancer (HCC) - Primary    Relevant Orders    POC Urinalysis Dipstick, Multipro (Completed)      Other Visit Diagnoses     Erectile dysfunction, unspecified erectile dysfunction type  (Chronic)           His PSA is <0.2 suggesting no clinical evidence of prostate cancer at this time.     Options discussed for ED.  He is not interested in any surgical procedure such as prosthesis.  I did discuss use of Trimix with him but at this point he wants to hold off.      FOLLOW UP     Return in about 6 months (around 5/11/2022).        (Please note that portions of this note were completed with a voice recognition program.)  Chemo Tierney MD  11/12/21  08:30 CST

## 2022-02-07 ENCOUNTER — OFFICE VISIT (OUTPATIENT)
Dept: OTOLARYNGOLOGY | Facility: CLINIC | Age: 77
End: 2022-02-07

## 2022-02-07 VITALS
SYSTOLIC BLOOD PRESSURE: 141 MMHG | WEIGHT: 195 LBS | HEART RATE: 82 BPM | TEMPERATURE: 98 F | BODY MASS INDEX: 29.55 KG/M2 | RESPIRATION RATE: 20 BRPM | HEIGHT: 68 IN | DIASTOLIC BLOOD PRESSURE: 74 MMHG

## 2022-02-07 DIAGNOSIS — C43.39 MELANOMA OF CHEEK: ICD-10-CM

## 2022-02-07 DIAGNOSIS — Z79.02 ANTIPLATELET OR ANTITHROMBOTIC LONG-TERM USE: Primary | ICD-10-CM

## 2022-02-07 PROCEDURE — 99203 OFFICE O/P NEW LOW 30 MIN: CPT | Performed by: OTOLARYNGOLOGY

## 2022-02-07 NOTE — H&P (VIEW-ONLY)
PRIMARY CARE PROVIDER: Ozzie Feliz MD  REFERRING PROVIDER: No ref. provider found    Chief Complaint   Patient presents with   • Skin Lesion     Melanoma right temple       Subjective   History of Present Illness:  Karan Piedra is a  76 y.o. male who presents for surgical consultation regarding a biopsy-proven malignant melanoma of the right cheek.  Prior to the biopsy, the lesion had the following characteristics:    Quality: pigmented lesion  Severity: mild  Duration: 6-12 months  Timing: constant  Modifying Factors: none  Associated Signs & Symptoms: no lymph node swelling, bleeding, bone pain, unexpected weight loss.    Review of Systems:  Review of Systems   Constitutional: Negative for chills, fatigue, fever and unexpected weight change.   HENT: Negative for facial swelling.    Respiratory: Negative for cough, chest tightness and shortness of breath.    Cardiovascular: Negative for chest pain.   Musculoskeletal: Negative for neck pain.   Skin: Positive for color change.   Neurological: Negative for facial asymmetry.   Hematological: Negative for adenopathy. Does not bruise/bleed easily.       Past History:  Past Medical History:   Diagnosis Date   • Arthritis    • Colon polyps    • High cholesterol    • Hypertension    • Mycobacterium avium complex (HCC)    • Prostate CA (HCC)    • PSA elevation    • Seasonal allergies      Past Surgical History:   Procedure Laterality Date   • COLONOSCOPY  08/10/2016    five 5 mm polyps in the transverse colon,at the hepatic flexure and in the cecum  resected and retrieved   • COLONOSCOPY N/A 10/19/2021    Procedure: COLONOSCOPY WITH ANESTHESIA;  Surgeon: Scotty Jorgensen DO;  Location: [facility] ENDOSCOPY;  Service: Gastroenterology;  Laterality: N/A;  pre adenomatous polyp  post polyps  Ozzie Feliz MD   • COLONOSCOPY N/A 10/20/2021    Procedure: COLONOSCOPY WITH ANESTHESIA;  Surgeon: Scotty Jorgensen DO;  Location: [facility] ENDOSCOPY;  Service:  Gastroenterology;  Laterality: N/A;  pre: hx polyps  post: diverticulosis  Ozzie Feliz MD   • HERNIA REPAIR      abdominal   • PROSTATE BIOPSY N/A 8/7/2018    Procedure: PROSTATE ULTRASOUND  URONAV FUSION BIOPSY;  Surgeon: Chemo Tierney MD;  Location:  [facility] OR;  Service: Urology   • PROSTATE ULTRASOUND BIOPSY  12/2016    negative   • PROSTATECTOMY N/A 11/13/2018    Procedure: PROSTATECTOMY LAPAROSCOPIC WITH DAVINCI SI ROBOT WITH PELVIC LYMPH NODE DISSECTION;  Surgeon: Chemo Tierney MD;  Location:  [facility] OR;  Service: DaVinci   • ROTATOR CUFF REPAIR     • TONSILLECTOMY       Family History   Problem Relation Age of Onset   • No Known Problems Father    • No Known Problems Mother    • Colon cancer Neg Hx    • Colon polyps Neg Hx    • Esophageal cancer Neg Hx      Social History     Tobacco Use   • Smoking status: Never Smoker   • Smokeless tobacco: Never Used   Vaping Use   • Vaping Use: Never used   Substance Use Topics   • Alcohol use: Yes     Comment: OCC   • Drug use: No     Allergies:  Patient has no known allergies.    Current Outpatient Medications:   •  aspirin 81 MG EC tablet, Take 81 mg by mouth Daily., Disp: , Rfl:   •  fexofenadine (ALLEGRA) 180 MG tablet, Take 180 mg by mouth Daily., Disp: , Rfl:   •  hydroCHLOROthiazide (HYDRODIURIL) 25 MG tablet, hydrochlorothiazide 25 mg tablet  TAKE 1 TABLET EVERY DAY, Disp: , Rfl:   •  irbesartan (AVAPRO) 300 MG tablet, Take 300 mg by mouth Daily., Disp: , Rfl:   •  meloxicam (MOBIC) 7.5 MG tablet, meloxicam 7.5 mg tablet, Disp: , Rfl:   •  Multiple Vitamins-Minerals (VISION-KATARINA PRESERVE PO), Take 1 tablet by mouth Daily., Disp: , Rfl:   •  rosuvastatin (CRESTOR) 20 MG tablet, Take 20 mg by mouth Daily., Disp: , Rfl:     Objective     Vital Signs:  Temp:  [98 °F (36.7 °C)] 98 °F (36.7 °C)  Heart Rate:  [82] 82  Resp:  [20] 20  BP: (141)/(74) 141/74    Physical Exam:  Physical Exam  Vitals and nursing note reviewed.   Constitutional:       General:  He is not in acute distress.     Appearance: He is well-developed. He is not diaphoretic.   HENT:      Head: Normocephalic and atraumatic.        Right Ear: External ear normal.      Left Ear: External ear normal.      Nose: Nose normal.   Eyes:      General: No scleral icterus.        Right eye: No discharge.         Left eye: No discharge.      Conjunctiva/sclera: Conjunctivae normal.      Pupils: Pupils are equal, round, and reactive to light.   Neck:      Thyroid: No thyromegaly.      Vascular: No JVD.      Trachea: No tracheal deviation.   Pulmonary:      Effort: Pulmonary effort is normal.      Breath sounds: No stridor.   Musculoskeletal:         General: No deformity. Normal range of motion.      Cervical back: Normal range of motion and neck supple.   Lymphadenopathy:      Cervical: No cervical adenopathy.   Skin:     General: Skin is warm and dry.      Coloration: Skin is not pale.      Findings: No erythema or rash.   Neurological:      Mental Status: He is alert and oriented to person, place, and time.      Cranial Nerves: No cranial nerve deficit.      Coordination: Coordination normal.   Psychiatric:         Speech: Speech normal.         Behavior: Behavior normal. Behavior is cooperative.         Thought Content: Thought content normal.         Judgment: Judgment normal.     Dermoscopy with mild surrounding pigmentation at the margins - 3 mm    Data Review:  I have personally reviewed the pathology report demonstrating a lentigo malignant melanoma with depth of invasion of 0.3 mm, with in situ extending to the peripheral margins of the biopsy:      Operative plan:    Excision with 1 cm margins and     Assessment   1. Antiplatelet or antithrombotic long-term use    2. Melanoma of cheek (HCC)        Plan     I have offered excision of the malignant melanoma of the right preauricular cheek with  permanent section analysis and likely  advancement flap with superior M-plasty closure in the operating room  under anesthesia.    We discussed the option of stopping the dressing until he obtained final margins, versus going ahead and closing it that day.  Due to the fact that we would be closing this in advancement flap fashion, and obscuring the margins, we will go ahead and close it that day.  If the margins are positive, these will be easy to map.    Discussion of skin lesion. Discussed risks, benefits, alternatives, and possible complications of excision of the skin lesion with reconstruction utilizing local tissue rearrangement, full-thickness skin grafting, or local interpolated flaps. Risks include, but are not limited too: bleeding, infection, hematoma, recurrence, need for additional procedures, flap failure, cosmetic deformity. Patient understands risks and would like to proceed with surgery.     My findings and recommendations were discussed and questions were answered.     Ye Argueta MD  02/07/22  10:49 CST

## 2022-02-07 NOTE — PROGRESS NOTES
PRIMARY CARE PROVIDER: Ozzie Feliz MD  REFERRING PROVIDER: No ref. provider found    Chief Complaint   Patient presents with   • Skin Lesion     Melanoma right temple       Subjective   History of Present Illness:  Karan Piedra is a  76 y.o. male who presents for surgical consultation regarding a biopsy-proven malignant melanoma of the right cheek.  Prior to the biopsy, the lesion had the following characteristics:    Quality: pigmented lesion  Severity: mild  Duration: 6-12 months  Timing: constant  Modifying Factors: none  Associated Signs & Symptoms: no lymph node swelling, bleeding, bone pain, unexpected weight loss.    Review of Systems:  Review of Systems   Constitutional: Negative for chills, fatigue, fever and unexpected weight change.   HENT: Negative for facial swelling.    Respiratory: Negative for cough, chest tightness and shortness of breath.    Cardiovascular: Negative for chest pain.   Musculoskeletal: Negative for neck pain.   Skin: Positive for color change.   Neurological: Negative for facial asymmetry.   Hematological: Negative for adenopathy. Does not bruise/bleed easily.       Past History:  Past Medical History:   Diagnosis Date   • Arthritis    • Colon polyps    • High cholesterol    • Hypertension    • Mycobacterium avium complex (HCC)    • Prostate CA (HCC)    • PSA elevation    • Seasonal allergies      Past Surgical History:   Procedure Laterality Date   • COLONOSCOPY  08/10/2016    five 5 mm polyps in the transverse colon,at the hepatic flexure and in the cecum  resected and retrieved   • COLONOSCOPY N/A 10/19/2021    Procedure: COLONOSCOPY WITH ANESTHESIA;  Surgeon: Scotty Jorgensen DO;  Location: [facility] ENDOSCOPY;  Service: Gastroenterology;  Laterality: N/A;  pre adenomatous polyp  post polyps  Ozzie Feliz MD   • COLONOSCOPY N/A 10/20/2021    Procedure: COLONOSCOPY WITH ANESTHESIA;  Surgeon: Scotty Jorgensen DO;  Location: [facility] ENDOSCOPY;  Service:  Gastroenterology;  Laterality: N/A;  pre: hx polyps  post: diverticulosis  Ozzie Feliz MD   • HERNIA REPAIR      abdominal   • PROSTATE BIOPSY N/A 8/7/2018    Procedure: PROSTATE ULTRASOUND  URONAV FUSION BIOPSY;  Surgeon: Chemo Tierney MD;  Location:  [facility] OR;  Service: Urology   • PROSTATE ULTRASOUND BIOPSY  12/2016    negative   • PROSTATECTOMY N/A 11/13/2018    Procedure: PROSTATECTOMY LAPAROSCOPIC WITH DAVINCI SI ROBOT WITH PELVIC LYMPH NODE DISSECTION;  Surgeon: Chemo Tierney MD;  Location:  [facility] OR;  Service: DaVinci   • ROTATOR CUFF REPAIR     • TONSILLECTOMY       Family History   Problem Relation Age of Onset   • No Known Problems Father    • No Known Problems Mother    • Colon cancer Neg Hx    • Colon polyps Neg Hx    • Esophageal cancer Neg Hx      Social History     Tobacco Use   • Smoking status: Never Smoker   • Smokeless tobacco: Never Used   Vaping Use   • Vaping Use: Never used   Substance Use Topics   • Alcohol use: Yes     Comment: OCC   • Drug use: No     Allergies:  Patient has no known allergies.    Current Outpatient Medications:   •  aspirin 81 MG EC tablet, Take 81 mg by mouth Daily., Disp: , Rfl:   •  fexofenadine (ALLEGRA) 180 MG tablet, Take 180 mg by mouth Daily., Disp: , Rfl:   •  hydroCHLOROthiazide (HYDRODIURIL) 25 MG tablet, hydrochlorothiazide 25 mg tablet  TAKE 1 TABLET EVERY DAY, Disp: , Rfl:   •  irbesartan (AVAPRO) 300 MG tablet, Take 300 mg by mouth Daily., Disp: , Rfl:   •  meloxicam (MOBIC) 7.5 MG tablet, meloxicam 7.5 mg tablet, Disp: , Rfl:   •  Multiple Vitamins-Minerals (VISION-KATARINA PRESERVE PO), Take 1 tablet by mouth Daily., Disp: , Rfl:   •  rosuvastatin (CRESTOR) 20 MG tablet, Take 20 mg by mouth Daily., Disp: , Rfl:     Objective     Vital Signs:  Temp:  [98 °F (36.7 °C)] 98 °F (36.7 °C)  Heart Rate:  [82] 82  Resp:  [20] 20  BP: (141)/(74) 141/74    Physical Exam:  Physical Exam  Vitals and nursing note reviewed.   Constitutional:       General:  He is not in acute distress.     Appearance: He is well-developed. He is not diaphoretic.   HENT:      Head: Normocephalic and atraumatic.        Right Ear: External ear normal.      Left Ear: External ear normal.      Nose: Nose normal.   Eyes:      General: No scleral icterus.        Right eye: No discharge.         Left eye: No discharge.      Conjunctiva/sclera: Conjunctivae normal.      Pupils: Pupils are equal, round, and reactive to light.   Neck:      Thyroid: No thyromegaly.      Vascular: No JVD.      Trachea: No tracheal deviation.   Pulmonary:      Effort: Pulmonary effort is normal.      Breath sounds: No stridor.   Musculoskeletal:         General: No deformity. Normal range of motion.      Cervical back: Normal range of motion and neck supple.   Lymphadenopathy:      Cervical: No cervical adenopathy.   Skin:     General: Skin is warm and dry.      Coloration: Skin is not pale.      Findings: No erythema or rash.   Neurological:      Mental Status: He is alert and oriented to person, place, and time.      Cranial Nerves: No cranial nerve deficit.      Coordination: Coordination normal.   Psychiatric:         Speech: Speech normal.         Behavior: Behavior normal. Behavior is cooperative.         Thought Content: Thought content normal.         Judgment: Judgment normal.     Dermoscopy with mild surrounding pigmentation at the margins - 3 mm    Data Review:  I have personally reviewed the pathology report demonstrating a lentigo malignant melanoma with depth of invasion of 0.3 mm, with in situ extending to the peripheral margins of the biopsy:      Operative plan:    Excision with 1 cm margins and     Assessment   1. Antiplatelet or antithrombotic long-term use    2. Melanoma of cheek (HCC)        Plan     I have offered excision of the malignant melanoma of the right preauricular cheek with  permanent section analysis and likely  advancement flap with superior M-plasty closure in the operating room  under anesthesia.    We discussed the option of stopping the dressing until he obtained final margins, versus going ahead and closing it that day.  Due to the fact that we would be closing this in advancement flap fashion, and obscuring the margins, we will go ahead and close it that day.  If the margins are positive, these will be easy to map.    Discussion of skin lesion. Discussed risks, benefits, alternatives, and possible complications of excision of the skin lesion with reconstruction utilizing local tissue rearrangement, full-thickness skin grafting, or local interpolated flaps. Risks include, but are not limited too: bleeding, infection, hematoma, recurrence, need for additional procedures, flap failure, cosmetic deformity. Patient understands risks and would like to proceed with surgery.     My findings and recommendations were discussed and questions were answered.     Ye Argueta MD  02/07/22  10:49 CST

## 2022-02-07 NOTE — PATIENT INSTRUCTIONS
CONTACT INFORMATION:  The main office phone number is [phone]. For emergencies after hours and on weekends, this number will convert over to our answering service and the on call provider will answer. Please try to keep non emergent phone calls/ questions to office hours 9am-5pm Monday through Friday.     [facility] [facility]  As an alternative, you can sign up and use the Epic MyChart system for more direct and quicker access for non emergent questions/ problems.  [facility] allows you to send messages to your doctor, view your test results, renew your prescriptions, schedule appointments, and more. To sign up, go to [employer] and click on the Sign Up Now link in the New User? box. Enter your [employer] Activation Code exactly as it appears below along with the last four digits of your Social Security Number and your Date of Birth () to complete the sign-up process. If you do not sign up before the expiration date, you must request a new code.    [facility] [facility] Activation Code: Activation code not generated  Current [facility] Status: Active    If you have questions, you can email [email or web address] or call [phone] to talk to our [facility] [facility] staff. Remember, [employer] is NOT to be used for urgent needs. For medical emergencies, dial 911.

## 2022-02-08 PROBLEM — C43.39 MELANOMA OF CHEEK (HCC): Status: ACTIVE | Noted: 2022-02-08

## 2022-02-09 DIAGNOSIS — Z01.818 PREOP TESTING: Primary | ICD-10-CM

## 2022-02-11 ENCOUNTER — PRE-ADMISSION TESTING (OUTPATIENT)
Dept: PREADMISSION TESTING | Facility: HOSPITAL | Age: 77
End: 2022-02-11

## 2022-02-11 VITALS
HEART RATE: 80 BPM | DIASTOLIC BLOOD PRESSURE: 61 MMHG | OXYGEN SATURATION: 97 % | SYSTOLIC BLOOD PRESSURE: 126 MMHG | RESPIRATION RATE: 18 BRPM | BODY MASS INDEX: 30.84 KG/M2 | HEIGHT: 68 IN | WEIGHT: 203.48 LBS

## 2022-02-11 LAB
ANION GAP SERPL CALCULATED.3IONS-SCNC: 9 MMOL/L (ref 5–15)
BUN SERPL-MCNC: 19 MG/DL (ref 8–23)
BUN/CREAT SERPL: 26 (ref 7–25)
CALCIUM SPEC-SCNC: 9.5 MG/DL (ref 8.6–10.5)
CHLORIDE SERPL-SCNC: 102 MMOL/L (ref 98–107)
CO2 SERPL-SCNC: 28 MMOL/L (ref 22–29)
CREAT SERPL-MCNC: 0.73 MG/DL (ref 0.76–1.27)
DEPRECATED RDW RBC AUTO: 38.6 FL (ref 37–54)
ERYTHROCYTE [DISTWIDTH] IN BLOOD BY AUTOMATED COUNT: 12.6 % (ref 12.3–15.4)
GFR SERPL CREATININE-BSD FRML MDRD: 105 ML/MIN/1.73
GLUCOSE SERPL-MCNC: 91 MG/DL (ref 65–99)
HCT VFR BLD AUTO: 47.7 % (ref 37.5–51)
HGB BLD-MCNC: 15.2 G/DL (ref 13–17.7)
MCH RBC QN AUTO: 27.1 PG (ref 26.6–33)
MCHC RBC AUTO-ENTMCNC: 31.9 G/DL (ref 31.5–35.7)
MCV RBC AUTO: 85 FL (ref 79–97)
PLATELET # BLD AUTO: 209 10*3/MM3 (ref 140–450)
PMV BLD AUTO: 9.4 FL (ref 6–12)
POTASSIUM SERPL-SCNC: 4.5 MMOL/L (ref 3.5–5.2)
RBC # BLD AUTO: 5.61 10*6/MM3 (ref 4.14–5.8)
SODIUM SERPL-SCNC: 139 MMOL/L (ref 136–145)
WBC NRBC COR # BLD: 8.2 10*3/MM3 (ref 3.4–10.8)

## 2022-02-11 PROCEDURE — 36415 COLL VENOUS BLD VENIPUNCTURE: CPT

## 2022-02-11 PROCEDURE — 80048 BASIC METABOLIC PNL TOTAL CA: CPT

## 2022-02-11 PROCEDURE — 93010 ELECTROCARDIOGRAM REPORT: CPT | Performed by: EMERGENCY MEDICINE

## 2022-02-11 PROCEDURE — 85027 COMPLETE CBC AUTOMATED: CPT

## 2022-02-11 PROCEDURE — 93005 ELECTROCARDIOGRAM TRACING: CPT

## 2022-02-11 NOTE — DISCHARGE INSTRUCTIONS
DAY OF SURGERY INSTRUCTIONS          ARRIVAL TIME: AS DIRECTED BY OFFICE    YOU MAY TAKE THE FOLLOWING MEDICATION(S) THE MORNING OF SURGERY WITH A SIP OF WATER: none     ALL OTHER HOME MEDICATIONS CHECK WITH YOUR DOCTOR (ask your health care provider about changing or stopping your regular medicines, especially if you are taking diabetes medicines or blood thinners)    DO NOT TAKE ANY ERECTILE DYSFUNCTION MEDICATIONS (EX:  CIALIS, VIAGRA) 24 HOURS PRIOR TO SURGERY    DO NOT TAKE WITHIN 24 hours of anesthesia, per anesthesia : Irbersartan               MANAGING PAIN AFTER SURGERY    We know you are probably wondering what your pain will be like after surgery.  Following surgery it is unrealistic to expect you will not have pain.   Pain is how our bodies let us know that something is wrong or cautions us to be careful.  That said, our goal is to make your pain tolerable.    Methods we may use to treat your pain include (oral or IV medications, PCAs, epidurals, nerve blocks, etc.)   While some procedures require IV pain medications for a short time after surgery, transitioning to pain medications by mouth allows for better management of pain.   Your nurse will encourage you to take oral pain medications whenever possible.  IV medications work almost immediately, but only last a short while.  Taking medications by mouth allows for a more constant level of medication in your blood stream for a longer period of time.      Once your pain is out of control it is harder to get back under control.  It is important you are aware when your next dose of pain medication is due.  If you are admitted, your nurse may write the time of your next dose on the white board in your room to help you remember.      We are interested in your pain and encourage you to inform us about aggravating factors during your visit.   Many times a simple repositioning every few hours can make a big difference.    If your physician says it is okay, do  not let your pain prevent you from getting out of bed. Be sure to call your nurse for assistance prior to getting up so you do not fall.      Before surgery, please decide your tolerable pain goal.  These faces help describe the pain ratings we use on a 0-10 scale.   Be prepared to tell us your goal and whether or not you take pain or anxiety medications at home.      BEFORE YOU COME TO THE HOSPITAL  (Pre-op instructions)  • Do not eat, drink, smoke or chew gum after midnight the night before surgery.  This also includes no mints.  • Morning of surgery take only the medicines you have been instructed with a sip of water unless otherwise instructed  by your physician.  • Do not shave, wear makeup or dark nail polish.  • Remove all jewelry including rings.  • Leave anything you consider valuable at home.  • Leave your suitcase in the car until after your surgery.  • Bring the following with you if applicable:  o Picture ID and insurance, Medicare or Medicaid cards  o Co-pay/deductible required by insurance (cash, check, credit card)  o Copy of advance directive, living will or power-of- documents if not brought to Pre-work  o CPAP or BIPAP mask and tubing  o Relaxation aids ( book, magazine), etc.  o Hearing aids                                 ON THE DAY OF SURGERY  · On the day of surgery check in at registration located at the main entrance of the hospital. Only one family member or friend are allowed per patient.  ? You will be registered and given a beeper with instructions where to wait in the main lobby.  ? When your beeper lights up and vibrates a member of the Outpatient Surgery staff will meet you at the double doors under the stair steps and escort you to your preoperative room.   · You may have cloth compression devices placed on your legs. These help to prevent blood clots and reduce swelling in your legs.  · An IV may be inserted into one of your veins.  · In the operating room, you may be given  "one or more of the following:  ? A medicine to help you relax (sedative).  ? A medicine to numb the area (local anesthetic).  ? A medicine to make you fall asleep (general anesthetic).  ? A medicine that is injected into an area of your body to numb everything below the injection site (regional anesthetic).  · Your surgical site will be marked or identified.  · You may be given an antibiotic through your IV to help prevent infection.  Contact a health care provider if you:  · Develop a fever of more than 100.4°F (38°C) or other feelings of illness during the 48 hours before your surgery.  · Have symptoms that get worse.  Have questions or concerns about your surgery    General Anesthesia/Surgery, Adult  General anesthesia is the use of medicines to make a person \"go to sleep\" (unconscious) for a medical procedure. General anesthesia must be used for certain procedures, and is often recommended for procedures that:  · Last a long time.  · Require you to be still or in an unusual position.  · Are major and can cause blood loss.  The medicines used for general anesthesia are called general anesthetics. As well as making you unconscious for a certain amount of time, these medicines:  · Prevent pain.  · Control your blood pressure.  · Relax your muscles.  Tell a health care provider about:  · Any allergies you have.  · All medicines you are taking, including vitamins, herbs, eye drops, creams, and over-the-counter medicines.  · Any problems you or family members have had with anesthetic medicines.  · Types of anesthetics you have had in the past.  · Any blood disorders you have.  · Any surgeries you have had.  · Any medical conditions you have.  · Any recent upper respiratory, chest, or ear infections.  · Any history of:  ? Heart or lung conditions, such as heart failure, sleep apnea, asthma, or chronic obstructive pulmonary disease (COPD).  ?  service.  ? Depression or anxiety.  · Any tobacco or drug use, " including marijuana or alcohol use.  · Whether you are pregnant or may be pregnant.  What are the risks?  Generally, this is a safe procedure. However, problems may occur, including:  · Allergic reaction.  · Lung and heart problems.  · Inhaling food or liquid from the stomach into the lungs (aspiration).  · Nerve injury.  · Air in the bloodstream, which can lead to stroke.  · Extreme agitation or confusion (delirium) when you wake up from the anesthetic.  · Waking up during your procedure and being unable to move. This is rare.  These problems are more likely to develop if you are having a major surgery or if you have an advanced or serious medical condition. You can prevent some of these complications by answering all of your health care provider's questions thoroughly and by following all instructions before your procedure.  General anesthesia can cause side effects, including:  · Nausea or vomiting.  · A sore throat from the breathing tube.  · Hoarseness.  · Wheezing or coughing.  · Shaking chills.  · Tiredness.  · Body aches.  · Anxiety.  · Sleepiness or drowsiness.  · Confusion or agitation.  RISKS AND COMPLICATIONS OF SURGERY  Your health care provider will discuss possible risks and complications with you before surgery. Common risks and complications include:    · Problems due to the use of anesthetics.  · Blood loss and replacement (does not apply to minor surgical procedures).  · Temporary increase in pain due to surgery.  · Uncorrected pain or problems that the surgery was meant to correct.  · Infection.  · New damage.    What happens before the procedure?    Medicines  Ask your health care provider about:  · Changing or stopping your regular medicines. This is especially important if you are taking diabetes medicines or blood thinners.  · Taking medicines such as aspirin and ibuprofen. These medicines can thin your blood. Do not take these medicines unless your health care provider tells you to take  them.  · Taking over-the-counter medicines, vitamins, herbs, and supplements. Do not take these during the week before your procedure unless your health care provider approves them.  General instructions  · Starting 3-6 weeks before the procedure, do not use any products that contain nicotine or tobacco, such as cigarettes and e-cigarettes. If you need help quitting, ask your health care provider.  · If you brush your teeth on the morning of the procedure, make sure to spit out all of the toothpaste.  · Tell your health care provider if you become ill or develop a cold, cough, or fever.  · If instructed by your health care provider, bring your sleep apnea device with you on the day of your surgery (if applicable).  · Ask your health care provider if you will be going home the same day, the following day, or after a longer hospital stay.  ? Plan to have someone take you home from the hospital or clinic.  ? Plan to have a responsible adult care for you for at least 24 hours after you leave the hospital or clinic. This is important.  What happens during the procedure?  · You will be given anesthetics through both of the following:  ? A mask placed over your nose and mouth.  ? An IV in one of your veins.  · You may receive a medicine to help you relax (sedative).  · After you are unconscious, a breathing tube may be inserted down your throat to help you breathe. This will be removed before you wake up.  · An anesthesia specialist will stay with you throughout your procedure. He or she will:  ? Keep you comfortable and safe by continuing to give you medicines and adjusting the amount of medicine that you get.  ? Monitor your blood pressure, pulse, and oxygen levels to make sure that the anesthetics do not cause any problems.  The procedure may vary among health care providers and hospitals.  What happens after the procedure?  · Your blood pressure, temperature, heart rate, breathing rate, and blood oxygen level will be  monitored until the medicines you were given have worn off.  · You will wake up in a recovery area. You may wake up slowly.  · If you feel anxious or agitated, you may be given medicine to help you calm down.  · If you will be going home the same day, your health care provider may check to make sure you can walk, drink, and urinate.  · Your health care provider will treat any pain or side effects you have before you go home.  · Do not drive for 24 hours if you were given a sedative.  Summary  · General anesthesia is used to keep you still and prevent pain during a procedure.  · It is important to tell your healthcare provider about your medical history and any surgeries you have had, and previous experience with anesthesia.  · Follow your healthcare provider’s instructions about when to stop eating, drinking, or taking certain medicines before your procedure.  · Plan to have someone take you home from the hospital or clinic.  This information is not intended to replace advice given to you by your health care provider. Make sure you discuss any questions you have with your health care provider.  Document Released: 03/26/2009 Document Revised: 08/03/2018 Document Reviewed: 08/03/2018  [employer] Patient Education © 2019 [employer]      Fall Prevention in Hospitals, Adult  As a hospital patient, your condition and the treatments you receive can increase your risk for falls. Some additional risk factors for falls in a hospital include:  · Being in an unfamiliar environment.  · Being on bed rest.  · Your surgery.  · Taking certain medicines.  · Your tubing requirements, such as intravenous (IV) therapy or catheters.  It is important that you learn how to decrease fall risks while at the hospital. Below are important tips that can help prevent falls.  SAFETY TIPS FOR PREVENTING FALLS  Talk about your risk of falling.  · Ask your health care provider why you are at risk for falling. Is it your medicine,  illness, tubing placement, or something else?  · Make a plan with your health care provider to keep you safe from falls.  · Ask your health care provider or pharmacist about side effects of your medicines. Some medicines can make you dizzy or affect your coordination.  Ask for help.  · Ask for help before getting out of bed. You may need to press your call button.  · Ask for assistance in getting safely to the toilet.  · Ask for a walker or cane to be put at your bedside. Ask that most of the side rails on your bed be placed up before your health care provider leaves the room.  · Ask family or friends to sit with you.  · Ask for things that are out of your reach, such as your glasses, hearing aids, telephone, bedside table, or call button.  Follow these tips to avoid falling:  · Stay lying or seated, rather than standing, while waiting for help.  · Wear rubber-soled slippers or shoes whenever you walk in the hospital.  · Avoid quick, sudden movements.  ¨ Change positions slowly.  ¨ Sit on the side of your bed before standing.  ¨ Stand up slowly and wait before you start to walk.  · Let your health care provider know if there is a spill on the floor.  · Pay careful attention to the medical equipment, electrical cords, and tubes around you.  · When you need help, use your call button by your bed or in the bathroom. Wait for one of your health care providers to help you.  · If you feel dizzy or unsure of your footing, return to bed and wait for assistance.  · Avoid being distracted by the TV, telephone, or another person in your room.  · Do not lean or support yourself on rolling objects, such as IV poles or bedside tables.     This information is not intended to replace advice given to you by your health care provider. Make sure you discuss any questions you have with your health care provider.     Document Released: 12/15/2001 Document Revised: 01/08/2016 Document Reviewed: 08/25/2013  [employer] Patient  Education ©2016 [employer]            PATIENT/FAMILY/RESPONSIBLE PARTY VERBALIZES UNDERSTANDING OF ABOVE EDUCATION.  COPY OF PAIN SCALE GIVEN AND REVIEWED WITH VERBALIZED UNDERSTANDING.

## 2022-02-12 ENCOUNTER — LAB (OUTPATIENT)
Dept: LAB | Facility: HOSPITAL | Age: 77
End: 2022-02-12

## 2022-02-12 DIAGNOSIS — Z01.818 PREOP TESTING: ICD-10-CM

## 2022-02-12 LAB
QT INTERVAL: 380 MS
QTC INTERVAL: 421 MS
SARS-COV-2 ORF1AB RESP QL NAA+PROBE: NOT DETECTED

## 2022-02-12 PROCEDURE — U0005 INFEC AGEN DETEC AMPLI PROBE: HCPCS

## 2022-02-12 PROCEDURE — U0004 COV-19 TEST NON-CDC HGH THRU: HCPCS

## 2022-02-12 PROCEDURE — C9803 HOPD COVID-19 SPEC COLLECT: HCPCS

## 2022-02-15 ENCOUNTER — ANESTHESIA EVENT (OUTPATIENT)
Dept: PERIOP | Facility: HOSPITAL | Age: 77
End: 2022-02-15

## 2022-02-15 ENCOUNTER — HOSPITAL ENCOUNTER (OUTPATIENT)
Facility: HOSPITAL | Age: 77
Setting detail: HOSPITAL OUTPATIENT SURGERY
Discharge: HOME | End: 2022-02-15
Attending: OTOLARYNGOLOGY | Admitting: OTOLARYNGOLOGY

## 2022-02-15 ENCOUNTER — ANESTHESIA (OUTPATIENT)
Dept: PERIOP | Facility: HOSPITAL | Age: 77
End: 2022-02-15

## 2022-02-15 VITALS
RESPIRATION RATE: 16 BRPM | DIASTOLIC BLOOD PRESSURE: 63 MMHG | HEART RATE: 65 BPM | TEMPERATURE: 97.5 F | SYSTOLIC BLOOD PRESSURE: 121 MMHG | OXYGEN SATURATION: 96 %

## 2022-02-15 DIAGNOSIS — C43.39 MELANOMA OF CHEEK: ICD-10-CM

## 2022-02-15 PROCEDURE — 88305 TISSUE EXAM BY PATHOLOGIST: CPT | Performed by: OTOLARYNGOLOGY

## 2022-02-15 PROCEDURE — 25010000002 FENTANYL CITRATE (PF) 100 MCG/2ML SOLUTION: Performed by: NURSE ANESTHETIST, CERTIFIED REGISTERED

## 2022-02-15 PROCEDURE — 14301 TIS TRNFR ANY 30.1-60 SQ CM: CPT | Performed by: OTOLARYNGOLOGY

## 2022-02-15 PROCEDURE — 25010000002 PROPOFOL 10 MG/ML EMULSION: Performed by: NURSE ANESTHETIST, CERTIFIED REGISTERED

## 2022-02-15 PROCEDURE — 25010000002 CEFAZOLIN PER 500 MG: Performed by: OTOLARYNGOLOGY

## 2022-02-15 PROCEDURE — 25010000002 DEXAMETHASONE PER 1 MG: Performed by: ANESTHESIOLOGY

## 2022-02-15 PROCEDURE — 25010000002 ONDANSETRON PER 1 MG: Performed by: NURSE ANESTHETIST, CERTIFIED REGISTERED

## 2022-02-15 RX ORDER — SODIUM CHLORIDE, SODIUM LACTATE, POTASSIUM CHLORIDE, CALCIUM CHLORIDE 600; 310; 30; 20 MG/100ML; MG/100ML; MG/100ML; MG/100ML
1000 INJECTION, SOLUTION INTRAVENOUS CONTINUOUS
Status: DISCONTINUED | OUTPATIENT
Start: 2022-02-15 | End: 2022-02-15 | Stop reason: HOSPADM

## 2022-02-15 RX ORDER — HYDROCODONE BITARTRATE AND ACETAMINOPHEN 7.5; 325 MG/1; MG/1
1 TABLET ORAL ONCE AS NEEDED
Status: DISCONTINUED | OUTPATIENT
Start: 2022-02-15 | End: 2022-02-15 | Stop reason: HOSPADM

## 2022-02-15 RX ORDER — MAGNESIUM HYDROXIDE 1200 MG/15ML
LIQUID ORAL AS NEEDED
Status: DISCONTINUED | OUTPATIENT
Start: 2022-02-15 | End: 2022-02-15 | Stop reason: HOSPADM

## 2022-02-15 RX ORDER — FLUMAZENIL 0.1 MG/ML
0.2 INJECTION INTRAVENOUS AS NEEDED
Status: DISCONTINUED | OUTPATIENT
Start: 2022-02-15 | End: 2022-02-15 | Stop reason: HOSPADM

## 2022-02-15 RX ORDER — NEOSTIGMINE METHYLSULFATE 5 MG/5 ML
SYRINGE (ML) INTRAVENOUS AS NEEDED
Status: DISCONTINUED | OUTPATIENT
Start: 2022-02-15 | End: 2022-02-15 | Stop reason: SURG

## 2022-02-15 RX ORDER — SODIUM CHLORIDE, SODIUM LACTATE, POTASSIUM CHLORIDE, CALCIUM CHLORIDE 600; 310; 30; 20 MG/100ML; MG/100ML; MG/100ML; MG/100ML
100 INJECTION, SOLUTION INTRAVENOUS CONTINUOUS
Status: DISCONTINUED | OUTPATIENT
Start: 2022-02-15 | End: 2022-02-15 | Stop reason: HOSPADM

## 2022-02-15 RX ORDER — DROPERIDOL 2.5 MG/ML
0.62 INJECTION, SOLUTION INTRAMUSCULAR; INTRAVENOUS ONCE AS NEEDED
Status: DISCONTINUED | OUTPATIENT
Start: 2022-02-15 | End: 2022-02-15 | Stop reason: HOSPADM

## 2022-02-15 RX ORDER — DEXAMETHASONE SODIUM PHOSPHATE 4 MG/ML
4 INJECTION, SOLUTION INTRA-ARTICULAR; INTRALESIONAL; INTRAMUSCULAR; INTRAVENOUS; SOFT TISSUE ONCE AS NEEDED
Status: COMPLETED | OUTPATIENT
Start: 2022-02-15 | End: 2022-02-15

## 2022-02-15 RX ORDER — LIDOCAINE HYDROCHLORIDE 10 MG/ML
0.5 INJECTION, SOLUTION EPIDURAL; INFILTRATION; INTRACAUDAL; PERINEURAL ONCE AS NEEDED
Status: DISCONTINUED | OUTPATIENT
Start: 2022-02-15 | End: 2022-02-15 | Stop reason: HOSPADM

## 2022-02-15 RX ORDER — SODIUM CHLORIDE 0.9 % (FLUSH) 0.9 %
3 SYRINGE (ML) INJECTION EVERY 12 HOURS SCHEDULED
Status: DISCONTINUED | OUTPATIENT
Start: 2022-02-15 | End: 2022-02-15 | Stop reason: HOSPADM

## 2022-02-15 RX ORDER — NALOXONE HCL 0.4 MG/ML
0.4 VIAL (ML) INJECTION AS NEEDED
Status: DISCONTINUED | OUTPATIENT
Start: 2022-02-15 | End: 2022-02-15 | Stop reason: HOSPADM

## 2022-02-15 RX ORDER — HYDROCODONE BITARTRATE AND ACETAMINOPHEN 7.5; 325 MG/1; MG/1
1 TABLET ORAL EVERY 4 HOURS PRN
Qty: 18 TABLET | Refills: 0 | Status: SHIPPED | OUTPATIENT
Start: 2022-02-15 | End: 2022-02-18

## 2022-02-15 RX ORDER — OXYCODONE AND ACETAMINOPHEN 7.5; 325 MG/1; MG/1
2 TABLET ORAL EVERY 4 HOURS PRN
Status: DISCONTINUED | OUTPATIENT
Start: 2022-02-15 | End: 2022-02-15 | Stop reason: HOSPADM

## 2022-02-15 RX ORDER — ROCURONIUM BROMIDE 10 MG/ML
INJECTION, SOLUTION INTRAVENOUS AS NEEDED
Status: DISCONTINUED | OUTPATIENT
Start: 2022-02-15 | End: 2022-02-15 | Stop reason: SURG

## 2022-02-15 RX ORDER — PROPOFOL 10 MG/ML
VIAL (ML) INTRAVENOUS AS NEEDED
Status: DISCONTINUED | OUTPATIENT
Start: 2022-02-15 | End: 2022-02-15 | Stop reason: SURG

## 2022-02-15 RX ORDER — FENTANYL CITRATE 50 UG/ML
INJECTION, SOLUTION INTRAMUSCULAR; INTRAVENOUS AS NEEDED
Status: DISCONTINUED | OUTPATIENT
Start: 2022-02-15 | End: 2022-02-15 | Stop reason: SURG

## 2022-02-15 RX ORDER — ACETAMINOPHEN 500 MG
1000 TABLET ORAL ONCE
Status: COMPLETED | OUTPATIENT
Start: 2022-02-15 | End: 2022-02-15

## 2022-02-15 RX ORDER — ONDANSETRON 2 MG/ML
INJECTION INTRAMUSCULAR; INTRAVENOUS AS NEEDED
Status: DISCONTINUED | OUTPATIENT
Start: 2022-02-15 | End: 2022-02-15 | Stop reason: SURG

## 2022-02-15 RX ORDER — ONDANSETRON 2 MG/ML
4 INJECTION INTRAMUSCULAR; INTRAVENOUS ONCE AS NEEDED
Status: DISCONTINUED | OUTPATIENT
Start: 2022-02-15 | End: 2022-02-15 | Stop reason: HOSPADM

## 2022-02-15 RX ORDER — LABETALOL HYDROCHLORIDE 5 MG/ML
5 INJECTION, SOLUTION INTRAVENOUS
Status: DISCONTINUED | OUTPATIENT
Start: 2022-02-15 | End: 2022-02-15 | Stop reason: HOSPADM

## 2022-02-15 RX ORDER — FENTANYL CITRATE 50 UG/ML
25 INJECTION, SOLUTION INTRAMUSCULAR; INTRAVENOUS
Status: DISCONTINUED | OUTPATIENT
Start: 2022-02-15 | End: 2022-02-15 | Stop reason: HOSPADM

## 2022-02-15 RX ORDER — LIDOCAINE HYDROCHLORIDE AND EPINEPHRINE 10; 10 MG/ML; UG/ML
INJECTION, SOLUTION INFILTRATION; PERINEURAL AS NEEDED
Status: DISCONTINUED | OUTPATIENT
Start: 2022-02-15 | End: 2022-02-15 | Stop reason: HOSPADM

## 2022-02-15 RX ORDER — LIDOCAINE HYDROCHLORIDE 40 MG/ML
SOLUTION TOPICAL AS NEEDED
Status: DISCONTINUED | OUTPATIENT
Start: 2022-02-15 | End: 2022-02-15 | Stop reason: SURG

## 2022-02-15 RX ORDER — OXYCODONE AND ACETAMINOPHEN 10; 325 MG/1; MG/1
1 TABLET ORAL ONCE AS NEEDED
Status: DISCONTINUED | OUTPATIENT
Start: 2022-02-15 | End: 2022-02-15 | Stop reason: HOSPADM

## 2022-02-15 RX ORDER — SODIUM CHLORIDE 0.9 % (FLUSH) 0.9 %
3 SYRINGE (ML) INJECTION AS NEEDED
Status: DISCONTINUED | OUTPATIENT
Start: 2022-02-15 | End: 2022-02-15 | Stop reason: HOSPADM

## 2022-02-15 RX ORDER — SODIUM CHLORIDE 0.9 % (FLUSH) 0.9 %
3-10 SYRINGE (ML) INJECTION AS NEEDED
Status: DISCONTINUED | OUTPATIENT
Start: 2022-02-15 | End: 2022-02-15 | Stop reason: HOSPADM

## 2022-02-15 RX ORDER — BUPIVACAINE HCL/0.9 % NACL/PF 0.1 %
2 PLASTIC BAG, INJECTION (ML) EPIDURAL ONCE
Status: COMPLETED | OUTPATIENT
Start: 2022-02-15 | End: 2022-02-15

## 2022-02-15 RX ORDER — MIDAZOLAM HYDROCHLORIDE 1 MG/ML
0.5 INJECTION INTRAMUSCULAR; INTRAVENOUS
Status: DISCONTINUED | OUTPATIENT
Start: 2022-02-15 | End: 2022-02-15 | Stop reason: HOSPADM

## 2022-02-15 RX ORDER — LIDOCAINE HYDROCHLORIDE 20 MG/ML
INJECTION, SOLUTION EPIDURAL; INFILTRATION; INTRACAUDAL; PERINEURAL AS NEEDED
Status: DISCONTINUED | OUTPATIENT
Start: 2022-02-15 | End: 2022-02-15 | Stop reason: SURG

## 2022-02-15 RX ADMIN — PROPOFOL 160 MG: 10 INJECTION, EMULSION INTRAVENOUS at 11:05

## 2022-02-15 RX ADMIN — LIDOCAINE HYDROCHLORIDE 1 EACH: 40 SOLUTION TOPICAL at 11:05

## 2022-02-15 RX ADMIN — FENTANYL CITRATE 100 MCG: 50 INJECTION, SOLUTION INTRAMUSCULAR; INTRAVENOUS at 11:05

## 2022-02-15 RX ADMIN — Medication 4 MG: at 11:52

## 2022-02-15 RX ADMIN — ROCURONIUM BROMIDE 30 MG: 10 INJECTION INTRAVENOUS at 11:05

## 2022-02-15 RX ADMIN — GLYCOPYRROLATE 0.6 MG: 0.2 INJECTION, SOLUTION INTRAMUSCULAR; INTRAVENOUS at 11:52

## 2022-02-15 RX ADMIN — DEXAMETHASONE SODIUM PHOSPHATE 4 MG: 4 INJECTION, SOLUTION INTRA-ARTICULAR; INTRALESIONAL; INTRAMUSCULAR; INTRAVENOUS; SOFT TISSUE at 09:21

## 2022-02-15 RX ADMIN — LIDOCAINE HYDROCHLORIDE 100 MG: 20 INJECTION, SOLUTION EPIDURAL; INFILTRATION; INTRACAUDAL; PERINEURAL at 11:05

## 2022-02-15 RX ADMIN — SODIUM CHLORIDE, POTASSIUM CHLORIDE, SODIUM LACTATE AND CALCIUM CHLORIDE 1000 ML: 600; 310; 30; 20 INJECTION, SOLUTION INTRAVENOUS at 08:31

## 2022-02-15 RX ADMIN — ONDANSETRON 4 MG: 2 INJECTION INTRAMUSCULAR; INTRAVENOUS at 11:52

## 2022-02-15 RX ADMIN — Medication 2 G: at 11:11

## 2022-02-15 RX ADMIN — SODIUM CHLORIDE, POTASSIUM CHLORIDE, SODIUM LACTATE AND CALCIUM CHLORIDE: 600; 310; 30; 20 INJECTION, SOLUTION INTRAVENOUS at 11:55

## 2022-02-15 RX ADMIN — ACETAMINOPHEN 1000 MG: 500 TABLET ORAL at 09:21

## 2022-02-15 NOTE — DISCHARGE INSTRUCTIONS

## 2022-02-15 NOTE — OP NOTE
PATIENT NAME:  Karan Piedra    DATE:  02/15/22    PREOPERATIVE DIAGNOSIS: pT1a malignant melanoma skin of right cheek    POSTOPERATIVE DIAGNOSIS: pT1 a malignant melanoma skin of right cheek    PROCEDURE:  1) excision of malignant melanoma of right cheek, 3.0 cm x 8.0 cm    2) cheek advancement flap with superior M plasty reconstruction of right cheek defect, 8.0 cm x 6.0 cm    SURGEON:  Ye Argueta MD, FACS    FACILITY: [facility] Operating Room    ANESTHESIA: General    DICTATED BY:  Ye Argueta MD, FACS    IVF: Per anesthesia    EBL: 50 cc    IMPLANTS: None    DRAINS: None    SPECIMENS: Malignant melanoma of right cheek, stitch at 12:00-permanent    COMPLICATIONS: None apparent    INDICATIONS FOR SURGERY: Mr. Piedra presented with a biopsy-proven malignant melanoma of his right cheek.  This was a Breslow's depth of 0.3 mm without evidence of ulceration.  He opted for surgical excision and flap reconstruction.    OPERATIVE FINDINGS:     Lesion: 1 cm x 1 cm  Margins: 10 mm  Defect: 3.0 cm x 8.0 cm  Flap and Defect: 6.0 cm x 8.0 cm  Depth: Subcutaneous fat    OPERATIVE DETAILS:       After patient verification and informed consent was obtained, the patient was taken to the operating room and laid supine on the operative table.  The skin was cleansed with alcohol, the lesion was marked, and the skin infiltrated with 1% lidocaine of 1:100,000 epinephrine.  The skin was sterilely prepped with Betadine and the patient was sterilely draped.    The skin surrounding the lesion was marked at the periphery the lesion.  I then measured 10 mm margins around the periphery of this, creating a planned circular excision.  This was then converted to a fusiform incision with superior M plasty in anticipation of the cheek advancement flap reconstruction.    The skin surrounding the lesion was incised with a 15 blade.  The skin was retracted with skin hooks and the lesion was removed from the underlying  tissue utilizing sharp dissection with the 15 blade in the subcutaneous fat plane.  The specimen was oriented with a stitch at 12 o'clock and sent for permanent section analysis.  Hemostasis was obtained with bipolar cautery set at 15.      I then elevated the cheek advancement flap in the subcutaneous fat plane utilizing a needle tip cautery set at 15 and tenotomy scissors.  I undermined anteriorly for 2.5 cm, posteriorly for 5 mm.   Hemostasis was obtained with bipolar cautery set at 15.      I then advanced the flap and closed this using deep 4-0 and 5-0 undyed Vicryl suture followed by running locking 6-0 Prolene suture.    Antibiotic ointment was placed to the incisions.    DISPOSITION:  The procedures were completed without complication and tolerated well.  The patient was released in the company of his wife to return home in satisfactory condition.  A follow-up appointment will be scheduled, routine post-op medications prescribed (if required), and post-op instructions were given to the responsible party.           Ye Argueta MD, FACS  Board Certified Facial Plastic and Reconstructive Surgery  Board Certified Otolaryngology -- Head and Neck Surgery    Electronically signed by Ye Argueta MD, 02/15/22, 12:05 PM CST.

## 2022-02-15 NOTE — ANESTHESIA POSTPROCEDURE EVALUATION
Patient: Karan Piedra    Procedure Summary     Date: 02/15/22 Room / Location:  [facility] OR 03 /  [facility]    Anesthesia Start: 1101 Anesthesia Stop: 1205    Procedure: 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 with superior M-plasty closure. (Right ) Diagnosis:       Melanoma of cheek (HCC)      (Melanoma of cheek (HCC) [C43.39])    Surgeons: Ye Argueta MD Provider: Case Pruett CRNA    Anesthesia Type: general ASA Status: 3          Anesthesia Type: general    Vitals  Vitals Value Taken Time   /65 02/15/22 1215   Temp 97.5 °F (36.4 °C) 02/15/22 1215   Pulse 66 02/15/22 1218   Resp 14 02/15/22 1218   SpO2 96 % 02/15/22 1218           Post Anesthesia Care and Evaluation    Patient location during evaluation: PACU  Patient participation: complete - patient participated  Level of consciousness: awake and alert  Pain management: adequate  Airway patency: patent  Anesthetic complications: No anesthetic complications  PONV Status: none  Cardiovascular status: acceptable and hemodynamically stable  Respiratory status: acceptable  Hydration status: acceptable    Comments: Blood pressure 121/63, pulse 65, temperature 97.5 °F (36.4 °C), temperature source Temporal, resp. rate 16, SpO2 96 %.    Patient discharged from PACU based upon Cresencio score. Please see RN notes for further details

## 2022-02-15 NOTE — ANESTHESIA PREPROCEDURE EVALUATION
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  Cardiovascular   Exercise tolerance: good (4-7 METS)    ECG reviewed    (+) hypertension well controlled, hyperlipidemia,   (-) pacemaker, past MI, angina, cardiac stents      Neuro/Psych  (-) seizures, TIA, CVA  GI/Hepatic/Renal/Endo    (-) GERD, liver disease, no renal disease, diabetes    Musculoskeletal     Abdominal    Substance History      OB/GYN          Other   arthritis,    history of cancer (prostate, melanoma)                      Anesthesia Plan    ASA 3     general     intravenous induction     Anesthetic plan, all risks, benefits, and alternatives have been provided, discussed and informed consent has been obtained with: patient.

## 2022-02-15 NOTE — ANESTHESIA PROCEDURE NOTES
Airway  Urgency: elective    Date/Time: 2/15/2022 11:05 AM  Airway not difficult    General Information and Staff    Patient location during procedure: OR  CRNA: Case Pruett CRNA    Indications and Patient Condition  Indications for airway management: airway protection    Preoxygenated: yes  Mask difficulty assessment: 1 - vent by mask    Final Airway Details  Final airway type: endotracheal airway      Successful airway: ETT  Cuffed: yes   Successful intubation technique: direct laryngoscopy  Facilitating devices/methods: Bougie  Endotracheal tube insertion site: oral  Blade: Liriano  Blade size: 2  ETT size (mm): 7.5  Cormack-Lehane Classification: grade IIb - view of arytenoids or posterior of glottis only  Placement verified by: chest auscultation and capnometry   Cuff volume (mL): 7  Measured from: lips  ETT/EBT  to lips (cm): 21  Number of attempts at approach: 1  Assessment: lips, teeth, and gum same as pre-op    Additional Comments  Atraumatic intubation

## 2022-02-23 ENCOUNTER — OFFICE VISIT (OUTPATIENT)
Dept: OTOLARYNGOLOGY | Facility: CLINIC | Age: 77
End: 2022-02-23

## 2022-02-23 VITALS — SYSTOLIC BLOOD PRESSURE: 111 MMHG | DIASTOLIC BLOOD PRESSURE: 69 MMHG | HEART RATE: 85 BPM | TEMPERATURE: 97 F

## 2022-02-23 DIAGNOSIS — C43.39 MELANOMA OF CHEEK: Primary | ICD-10-CM

## 2022-02-23 PROCEDURE — 99024 POSTOP FOLLOW-UP VISIT: CPT | Performed by: NURSE PRACTITIONER

## 2022-02-23 NOTE — PROGRESS NOTES
CC/Reason for visit: Karan Piedra returns to the office status post excision of a pT1a malignant melanoma of the right cheek with cheek advancement flap and superior M plasty reconstruction on 2/15/2022.      SUBJECTIVE:  The patient has been doing very well.  He currently denies any related complaints.  He denies pain, fever, chills, bleeding, or drainage.      OBJECTIVE:  /69   Pulse 85   Temp 97 °F (36.1 °C) (Temporal)   Incision is healing well without evidence of infection.  Sutures were removed.  There is a small 1 cm area mid incision near the tragus that is not well approximated.  This is healing in well by secondary intention.      Pathology reviewed:          ASSESSMENT:  Diagnoses and all orders for this visit:    1. Melanoma of cheek (HCC) (Primary)        PLAN:   Protect the incisions from sunlight. Sunlight to the incisions will cause permanent pigmentation to the incision line and make the incision more noticeable. After the incision has reepithelialized (typically 2-3 weeks after the procedure), you may begin to use sunscreen with an SPF of 15 or greater    Use mupirocin ointment until entire incision is fully epithelialized.  He may then use a OTC silicone-based wound cream to the incisions to optimize the end result. Apply topically twice daily, or as directed, to help optimize wound healing and decrease redness.    He was instructed to keep his regular scheduled follow-ups with Dr. Lopez for routine skin exams and cancer surveillance.      Follow-up here in 3 months.  He was instructed to call return should any problems arise prior to next visit.      Payal Husain, LUZ MARIA   02/23/2022  16:49 CST

## 2022-05-05 DIAGNOSIS — C61 PROSTATE CANCER: Primary | ICD-10-CM

## 2022-05-06 LAB — PSA SERPL-MCNC: <0.014 NG/ML (ref 0–4)

## 2022-05-09 NOTE — PROGRESS NOTES
Chief Complaint  Follow-up bladder cancer    Subjective          Karan Piedra presents to [facility] UROLOGY for prostate cancer.  In November 2018 he underwent a robot-assisted laparoscopic prostatectomy bilateral pelvic lymphadenectomy which revealed [place] grade 3+4 = 7 with organ confined disease and negative margins.Tissue Pathology Exam (02/15/2022 11:23) patient is doing well and continence is minimal he does have erectile dysfunction.      Current Outpatient Medications:   •  aspirin 81 MG EC tablet, Take 81 mg by mouth Daily. On hold for surgery, Disp: , Rfl:   •  hydroCHLOROthiazide (HYDRODIURIL) 25 MG tablet, Take 25 mg by mouth Daily., Disp: , Rfl:   •  irbesartan (AVAPRO) 300 MG tablet, Take 300 mg by mouth Daily., Disp: , Rfl:   •  meloxicam (MOBIC) 7.5 MG tablet, Take 7.5 mg by mouth Daily., Disp: , Rfl:   •  Multiple Vitamins-Minerals (VISION-KATARINA PRESERVE PO), Take 1 tablet by mouth Daily., Disp: , Rfl:   •  rosuvastatin (CRESTOR) 20 MG tablet, Take 20 mg by mouth Daily., Disp: , Rfl:   •  fexofenadine (ALLEGRA) 180 MG tablet, Take 180 mg by mouth Daily., Disp: , Rfl:   Past Medical History:   Diagnosis Date   • Arthritis    • Colon polyps    • High cholesterol    • Hypertension    • Mycobacterium avium complex (HCC)    • Prostate CA (HCC)    • PSA elevation    • Seasonal allergies      Past Surgical History:   Procedure Laterality Date   • COLONOSCOPY  08/10/2016    five 5 mm polyps in the transverse colon,at the hepatic flexure and in the cecum  resected and retrieved   • COLONOSCOPY N/A 10/19/2021    Procedure: COLONOSCOPY WITH ANESTHESIA;  Surgeon: Scotty Jorgensen DO;  Location: [facility] ENDOSCOPY;  Service: Gastroenterology;  Laterality: N/A;  pre adenomatous polyp  post polyps  Ozzie Feliz MD   • COLONOSCOPY N/A 10/20/2021    Procedure: COLONOSCOPY WITH ANESTHESIA;  Surgeon: Scotty Jorgensen DO;  Location: [facility] ENDOSCOPY;  Service: Gastroenterology;   "Laterality: N/A;  pre: hx polyps  post: diverticulosis  Ozzie Feliz MD   • HERNIA REPAIR      abdominal   • PROSTATE BIOPSY N/A 8/7/2018    Procedure: PROSTATE ULTRASOUND  URONAV FUSION BIOPSY;  Surgeon: Chemo Tierney MD;  Location: [facility] OR;  Service: Urology   • PROSTATE ULTRASOUND BIOPSY  12/2016    negative   • PROSTATECTOMY N/A 11/13/2018    Procedure: PROSTATECTOMY LAPAROSCOPIC WITH DAVINCI SI ROBOT WITH PELVIC LYMPH NODE DISSECTION;  Surgeon: Chemo Tierney MD;  Location: [facility] OR;  Service: DaVinci   • ROTATOR CUFF REPAIR Bilateral    • SKIN LESION EXCISION Right 2/15/2022    Procedure: 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 with superior M-plasty closure.;  Surgeon: Ye Argueta MD;  Location: [facility] OR;  Service: ENT;  Laterality: Right;   • TONSILLECTOMY         Review  of systems  Constitutional: Negative for chills or fever.   Gastrointestinal: Negative for abdominal pain, anal bleeding or blood in stool.     Objective   PHYSICAL EXAM  Vital Signs:   Temp 97.8 °F (36.6 °C)   Ht 172.7 cm (68\")   Wt 92.1 kg (203 lb)   BMI 30.87 kg/m²     Constitutional: Patient is without distress or deformity.  Vital signs are reviewed as above.    Neuro: No confusion; No disorientation; Alert and oriented  Pulmonary: No respiratory distress.   Skin: No pallor or diaphoresis    DATA  Result Review :              Results for orders placed or performed in visit on 05/12/22   POC Urinalysis Dipstick, Multipro    Specimen: Urine   Result Value Ref Range    Color Yellow Yellow, Straw, Dark Yellow, Soni    Clarity, UA Clear Clear    Glucose, UA Negative Negative, 1000 mg/dL (3+) mg/dL    Bilirubin Negative Negative    Ketones, UA 15 mg/dL (A) Negative    Specific Gravity  1.015 1.005 - 1.030    Blood, UA Negative Negative    pH, Urine 7.0 5.0 - 8.0    Protein, POC 30 mg/dL (A) Negative mg/dL    Urobilinogen, UA 8 E.U./dL  (A) Normal    Nitrite, UA Negative Negative "    Leukocytes Negative Negative     Lab Results   Component Value Date    PSA <0.014 05/05/2022    PSA <0.014 11/04/2021    PSA <0.014 05/04/2021        ASSESSMENT AND PLAN          Problem List Items Addressed This Visit        Hematology and Neoplasia    Prostate cancer (HCC) - Primary    Relevant Orders    POC Urinalysis Dipstick, Multipro (Completed)      His PSA is <0.2 suggesting no clinical evidence of prostate cancer at this time.         FOLLOW UP     No follow-ups on file.        (Please note that portions of this note were completed with a voice recognition program.)  Chemo Tierney MD  05/12/22  10:19 CDT

## 2022-05-12 ENCOUNTER — OFFICE VISIT (OUTPATIENT)
Dept: UROLOGY | Facility: CLINIC | Age: 77
End: 2022-05-12

## 2022-05-12 VITALS — TEMPERATURE: 97.8 F | WEIGHT: 203 LBS | HEIGHT: 68 IN | BODY MASS INDEX: 30.77 KG/M2

## 2022-05-12 DIAGNOSIS — C61 PROSTATE CANCER: Primary | ICD-10-CM

## 2022-05-12 LAB
BILIRUB BLD-MCNC: NEGATIVE MG/DL
CLARITY, POC: CLEAR
COLOR UR: YELLOW
GLUCOSE UR STRIP-MCNC: NEGATIVE MG/DL
KETONES UR QL: ABNORMAL
LEUKOCYTE EST, POC: NEGATIVE
NITRITE UR-MCNC: NEGATIVE MG/ML
PH UR: 7 [PH] (ref 5–8)
PROT UR STRIP-MCNC: ABNORMAL MG/DL
RBC # UR STRIP: NEGATIVE /UL
SP GR UR: 1.01 (ref 1–1.03)
UROBILINOGEN UR QL: ABNORMAL

## 2022-05-12 PROCEDURE — 81001 URINALYSIS AUTO W/SCOPE: CPT | Performed by: UROLOGY

## 2022-05-12 PROCEDURE — 99213 OFFICE O/P EST LOW 20 MIN: CPT | Performed by: UROLOGY

## 2022-11-11 NOTE — PROGRESS NOTES
Chief Complaint  Follow-up prostate cancer    Subjective          Karan Piedra presents to [facility] UROLOGY [place]   Mr. Piedra returns today for follow-up adenocarcinoma the prostate.  Treatment can be summarized as follows:    -11/13/2018: Robot-assisted laparoscopic prostatectomy; [place] 3+ 4 = 7; pT2 with negative margins  Patient denies any possible systemic symptoms of prostate cancer such as weight loss, lower extremity edema, or skeletal pain that could be worrisome for systemic disease. His lower urinary tract  Symptoms are not an issue.  He voids with a good stream.  He has no incontinence..   Erections are poor quality in tumescence and rigidity.       Current Outpatient Medications:   •  aspirin 81 MG EC tablet, Take 81 mg by mouth Daily. On hold for surgery, Disp: , Rfl:   •  cetirizine (zyrTEC) 10 MG tablet, Take 10 mg by mouth Daily., Disp: , Rfl:   •  hydroCHLOROthiazide (HYDRODIURIL) 25 MG tablet, Take 25 mg by mouth Daily., Disp: , Rfl:   •  irbesartan (AVAPRO) 300 MG tablet, Take 300 mg by mouth Daily., Disp: , Rfl:   •  meloxicam (MOBIC) 7.5 MG tablet, Take 7.5 mg by mouth Daily., Disp: , Rfl:   •  Multiple Vitamins-Minerals (VISION-KATARINA PRESERVE PO), Take 1 tablet by mouth Daily., Disp: , Rfl:   •  rosuvastatin (CRESTOR) 20 MG tablet, Take 20 mg by mouth Daily., Disp: , Rfl:   Past Medical History:   Diagnosis Date   • Arthritis    • Colon polyps    • High cholesterol    • Hypertension    • Mycobacterium avium complex (HCC)    • Prostate CA (HCC)    • PSA elevation    • Seasonal allergies      Past Surgical History:   Procedure Laterality Date   • COLONOSCOPY  08/10/2016    five 5 mm polyps in the transverse colon,at the hepatic flexure and in the cecum  resected and retrieved   • COLONOSCOPY N/A 10/19/2021    Procedure: COLONOSCOPY WITH ANESTHESIA;  Surgeon: Scotty Jorgensen DO;  Location: [facility] ENDOSCOPY;  Service: Gastroenterology;  Laterality: N/A;  pre  "adenomatous polyp  post polyps  Ozzie Feliz MD   • COLONOSCOPY N/A 10/20/2021    Procedure: COLONOSCOPY WITH ANESTHESIA;  Surgeon: Scotty Jorgensen DO;  Location:  [facility] ENDOSCOPY;  Service: Gastroenterology;  Laterality: N/A;  pre: hx polyps  post: diverticulosis  Ozzie Feliz MD   • HERNIA REPAIR      abdominal   • PROSTATE BIOPSY N/A 8/7/2018    Procedure: PROSTATE ULTRASOUND  URONAV FUSION BIOPSY;  Surgeon: Chemo Tierney MD;  Location:  [facility] OR;  Service: Urology   • PROSTATE ULTRASOUND BIOPSY  12/2016    negative   • PROSTATECTOMY N/A 11/13/2018    Procedure: PROSTATECTOMY LAPAROSCOPIC WITH DAVINCI SI ROBOT WITH PELVIC LYMPH NODE DISSECTION;  Surgeon: Chemo Tierney MD;  Location: [facility] OR;  Service: DaVinci   • ROTATOR CUFF REPAIR Bilateral    • SKIN LESION EXCISION Right 2/15/2022    Procedure: 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 with superior M-plasty closure.;  Surgeon: Ye Argueta MD;  Location:  [facility] OR;  Service: ENT;  Laterality: Right;   • TONSILLECTOMY             Review of Systems  Review  of systems  Constitutional: Negative for chills and fever.   Gastrointestinal: Negative for abdominal pain, anal bleeding and blood in stool.   Genitourinary: Negative for flank pain and hematuria.      Objective   PHYSICAL EXAM  Vital Signs:   Temp 96.8 °F (36 °C)   Ht 172.7 cm (68\")   Wt 91.4 kg (201 lb 9.6 oz)   BMI 30.65 kg/m²     Physical Exam  Constitutional: Patient is without distress or deformity.  Vital signs are reviewed as above.    Neuro: No confusion; No disorientation; Alert and oriented  Pulmonary: No respiratory distress.   Skin: No pallor or diaphoresis    DATA  Result Review :              Results for orders placed or performed in visit on 11/15/22   POC Urinalysis Dipstick, Multipro    Specimen: Urine   Result Value Ref Range    Color Yellow Yellow, Straw, Dark Yellow, Soni    Clarity, UA Clear Clear    Glucose, UA " Negative Negative mg/dL    Bilirubin Negative Negative    Ketones, UA Trace (A) Negative    Specific Gravity  1.025 1.005 - 1.030    Blood, UA Negative Negative    pH, Urine 7.0 5.0 - 8.0    Protein, POC Negative Negative mg/dL    Urobilinogen, UA 2.0 E.U./dL (A) Normal, 0.2 E.U./dL    Nitrite, UA Negative Negative    Leukocytes Negative Negative             Lab Results   Component Value Date    PSA <0.014 11/08/2022    PSA <0.014 05/05/2022    PSA <0.014 11/04/2021                ASSESSMENT AND PLAN          Problem List Items Addressed This Visit        Hematology and Neoplasia    Prostate cancer (HCC) - Primary    Relevant Orders    PSA DIAGNOSTIC   Other Visit Diagnoses     Erectile dysfunction, unspecified erectile dysfunction type          -Patient doing well since prostatectomy.  PSA remains undetectable.  -Patient has PDE 5 refractory erectile dysfunction.  Not interested in injections or surgery at this point        FOLLOW UP     Return in about 6 months (around 5/15/2023) for PSA before visit.        (Please note that portions of this note were completed with a voice recognition program.)  Chemo Tierney MD  11/15/22  17:02 CST

## 2022-11-15 ENCOUNTER — OFFICE VISIT (OUTPATIENT)
Dept: UROLOGY | Facility: CLINIC | Age: 77
End: 2022-11-15

## 2022-11-15 VITALS — TEMPERATURE: 96.8 F | WEIGHT: 201.6 LBS | HEIGHT: 68 IN | BODY MASS INDEX: 30.55 KG/M2

## 2022-11-15 DIAGNOSIS — N52.9 ERECTILE DYSFUNCTION, UNSPECIFIED ERECTILE DYSFUNCTION TYPE: ICD-10-CM

## 2022-11-15 DIAGNOSIS — C61 PROSTATE CANCER: Primary | ICD-10-CM

## 2022-11-15 LAB
BILIRUB BLD-MCNC: NEGATIVE MG/DL
CLARITY, POC: CLEAR
COLOR UR: YELLOW
GLUCOSE UR STRIP-MCNC: NEGATIVE MG/DL
KETONES UR QL: ABNORMAL
LEUKOCYTE EST, POC: NEGATIVE
NITRITE UR-MCNC: NEGATIVE MG/ML
PH UR: 7 [PH] (ref 5–8)
PROT UR STRIP-MCNC: NEGATIVE MG/DL
RBC # UR STRIP: NEGATIVE /UL
SP GR UR: 1.02 (ref 1–1.03)
UROBILINOGEN UR QL: ABNORMAL

## 2022-11-15 PROCEDURE — 81003 URINALYSIS AUTO W/O SCOPE: CPT | Performed by: UROLOGY

## 2022-11-15 PROCEDURE — 99213 OFFICE O/P EST LOW 20 MIN: CPT | Performed by: UROLOGY

## 2022-11-15 RX ORDER — CETIRIZINE HYDROCHLORIDE 10 MG/1
10 TABLET ORAL DAILY
COMMUNITY

## 2023-05-08 NOTE — PROGRESS NOTES
Chief Complaint  Prostate Cancer    Subjective          Karan Piedra presents to [facility] UROLOGY [place]   He is status post robot-assisted laparoscopic prostatectomy for favorable intermediate risk disease. Patient denies any possible systemic symptoms of prostate cancer such as weight loss, lower extremity edema, or skeletal pain that could be worrisome for systemic disease.  His continence is good.  Have erectile dysfunction secondary to prostatectomy.  He is not interested in pursuing any further treatment since this is been refractory to phosphodiesterase 5 inhibitors  -11/13/2018: Robot-assisted laparoscopic prostatectomy; [place] 3+ 4 = 7; pT2 with negative margins        Current Outpatient Medications:     aspirin 81 MG EC tablet, Take 1 tablet by mouth Daily. On hold for surgery, Disp: , Rfl:     cetirizine (zyrTEC) 10 MG tablet, Take 1 tablet by mouth Daily., Disp: , Rfl:     hydroCHLOROthiazide (HYDRODIURIL) 25 MG tablet, Take 1 tablet by mouth Daily., Disp: , Rfl:     irbesartan (AVAPRO) 300 MG tablet, Take 1 tablet by mouth Daily., Disp: , Rfl:     meloxicam (MOBIC) 7.5 MG tablet, Take 1 tablet by mouth Daily., Disp: , Rfl:     Multiple Vitamins-Minerals (VISION-KATARINA PRESERVE PO), Take 1 tablet by mouth Daily., Disp: , Rfl:     rosuvastatin (CRESTOR) 20 MG tablet, Take 1 tablet by mouth Daily., Disp: , Rfl:   Past Medical History:   Diagnosis Date    Arthritis     Colon polyps     High cholesterol     Hypertension     Mycobacterium avium complex     Prostate CA     PSA elevation     Seasonal allergies      Past Surgical History:   Procedure Laterality Date    COLONOSCOPY  08/10/2016    five 5 mm polyps in the transverse colon,at the hepatic flexure and in the cecum  resected and retrieved    COLONOSCOPY N/A 10/19/2021    Procedure: COLONOSCOPY WITH ANESTHESIA;  Surgeon: Scotty Jorgensen DO;  Location: [facility] ENDOSCOPY;  Service: Gastroenterology;  Laterality: N/A;  pre  "adenomatous polyp  post polyps  Ozzie Feliz MD    COLONOSCOPY N/A 10/20/2021    Procedure: COLONOSCOPY WITH ANESTHESIA;  Surgeon: Scotty Jorgensen DO;  Location:  [facility] ENDOSCOPY;  Service: Gastroenterology;  Laterality: N/A;  pre: hx polyps  post: diverticulosis  Ozzie Feliz MD    HERNIA REPAIR      abdominal    PROSTATE BIOPSY N/A 8/7/2018    Procedure: PROSTATE ULTRASOUND  URONAV FUSION BIOPSY;  Surgeon: Chemo Tierney MD;  Location:  [facility] OR;  Service: Urology    PROSTATE ULTRASOUND BIOPSY  12/2016    negative    PROSTATECTOMY N/A 11/13/2018    Procedure: PROSTATECTOMY LAPAROSCOPIC WITH DAVINCI SI ROBOT WITH PELVIC LYMPH NODE DISSECTION;  Surgeon: Chemo Tierney MD;  Location:  [facility] OR;  Service: DaVinci    ROTATOR CUFF REPAIR Bilateral     SKIN LESION EXCISION Right 2/15/2022    Procedure: 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 with superior M-plasty closure.;  Surgeon: Ye Argueta MD;  Location:  [facility] OR;  Service: ENT;  Laterality: Right;    TONSILLECTOMY             Review of Systems      Objective   PHYSICAL EXAM  Vital Signs:   Temp 97 °F (36.1 °C)   Ht 172.7 cm (68\")   Wt 91.6 kg (202 lb)   BMI 30.71 kg/m²     Physical Exam      DATA  Result Review :              Results for orders placed or performed in visit on 05/16/23   POC Urinalysis Dipstick, Multipro    Specimen: Urine   Result Value Ref Range    Color Yellow Yellow, Straw, Dark Yellow, Soni    Clarity, UA Clear Clear    Glucose, UA Negative Negative mg/dL    Bilirubin Negative Negative    Ketones, UA Negative Negative    Specific Gravity  1.020 1.005 - 1.030    Blood, UA Negative Negative    pH, Urine 7.0 5.0 - 8.0    Protein, POC Negative Negative mg/dL    Urobilinogen, UA 2.0 E.U./dL (A) Normal, 0.2 E.U./dL    Nitrite, UA Negative Negative    Leukocytes Negative Negative       Lab Results   Component Value Date    PSA <0.014 05/09/2023    PSA <0.014 11/08/2022    PSA " <0.014 05/05/2022          ASSESSMENT AND PLAN          Problem List Items Addressed This Visit          Hematology and Neoplasia    Prostate cancer - Primary    Relevant Orders    POC Urinalysis Dipstick, Multipro (Completed)    PSA DIAGNOSTIC     Other Visit Diagnoses       Erectile dysfunction, unspecified erectile dysfunction type              PSA is <0.2 which is indicative of MARTHA status.  Quality life is good after prostatectomy.  He does have erectile dysfunction but does not wish to pursue further management of this.        FOLLOW UP     Return in about 6 months (around 11/16/2023) for PSA before visit.        (Please note that portions of this note were completed with a voice recognition program.)  Chemo Tierney MD  05/17/23  11:06 CDT

## 2023-05-16 ENCOUNTER — OFFICE VISIT (OUTPATIENT)
Dept: UROLOGY | Facility: CLINIC | Age: 78
End: 2023-05-16
Payer: MEDICARE

## 2023-05-16 VITALS — WEIGHT: 202 LBS | BODY MASS INDEX: 30.62 KG/M2 | TEMPERATURE: 97 F | HEIGHT: 68 IN

## 2023-05-16 DIAGNOSIS — C61 PROSTATE CANCER: Primary | ICD-10-CM

## 2023-05-16 DIAGNOSIS — N52.9 ERECTILE DYSFUNCTION, UNSPECIFIED ERECTILE DYSFUNCTION TYPE: ICD-10-CM

## 2023-05-16 LAB
BILIRUB BLD-MCNC: NEGATIVE MG/DL
CLARITY, POC: CLEAR
COLOR UR: YELLOW
GLUCOSE UR STRIP-MCNC: NEGATIVE MG/DL
KETONES UR QL: NEGATIVE
LEUKOCYTE EST, POC: NEGATIVE
NITRITE UR-MCNC: NEGATIVE MG/ML
PH UR: 7 [PH] (ref 5–8)
PROT UR STRIP-MCNC: NEGATIVE MG/DL
RBC # UR STRIP: NEGATIVE /UL
SP GR UR: 1.02 (ref 1–1.03)
UROBILINOGEN UR QL: ABNORMAL

## 2023-05-16 PROCEDURE — 1159F MED LIST DOCD IN RCRD: CPT | Performed by: UROLOGY

## 2023-05-16 PROCEDURE — 1160F RVW MEDS BY RX/DR IN RCRD: CPT | Performed by: UROLOGY

## 2023-05-16 PROCEDURE — 81003 URINALYSIS AUTO W/O SCOPE: CPT | Performed by: UROLOGY

## 2023-05-16 PROCEDURE — 99213 OFFICE O/P EST LOW 20 MIN: CPT | Performed by: UROLOGY

## 2023-08-08 NOTE — ANESTHESIA POSTPROCEDURE EVALUATION
"Patient: Karan Piedra    Procedure Summary     Date: 10/19/21 Room / Location: [facility] ENDOSCOPY 5 / [facility]    Anesthesia Start: 0813 Anesthesia Stop: 0836    Procedure: COLONOSCOPY WITH ANESTHESIA (N/A ) Diagnosis:       History of adenomatous polyp of colon      (History of adenomatous polyp of colon [Z86.010])    Surgeons: Scotty Jorgensen DO Provider: Hermelinda Fisher CRNA    Anesthesia Type: MAC ASA Status: 2          Anesthesia Type: MAC    Vitals  Vitals Value Taken Time   /59 10/19/21 0851   Temp     Pulse 79 10/19/21 0852   Resp 20 10/19/21 0850   SpO2 97 % 10/19/21 0852   Vitals shown include unvalidated device data.        Post Anesthesia Care and Evaluation    Patient location during evaluation: PACU  Patient participation: complete - patient participated  Level of consciousness: awake and alert  Pain management: adequate  Airway patency: patent  Anesthetic complications: No anesthetic complications    Cardiovascular status: acceptable  Respiratory status: acceptable  Hydration status: acceptable    Comments: Blood pressure 106/59, pulse 72, temperature 97.3 °F (36.3 °C), temperature source Temporal, resp. rate 20, height 172.7 cm (68\"), weight 87.1 kg (192 lb), SpO2 98 %.    Pt discharged from PACU based on jane score >8      "
[de-identified] : Neurovascularly intact distally   Right Shoulder: No effusion, clean and dry incisions, intact skin, no fluctuance, no sign of infection, no wound erythema, no induration, no drainage,. 160 ff

## 2023-09-19 ENCOUNTER — TELEPHONE (OUTPATIENT)
Dept: FAMILY MEDICINE CLINIC | Facility: CLINIC | Age: 78
End: 2023-09-19
Payer: MEDICARE

## 2023-09-19 ENCOUNTER — OFFICE VISIT (OUTPATIENT)
Dept: FAMILY MEDICINE CLINIC | Facility: CLINIC | Age: 78
End: 2023-09-19
Payer: MEDICARE

## 2023-09-19 ENCOUNTER — LAB (OUTPATIENT)
Dept: LAB | Facility: HOSPITAL | Age: 78
End: 2023-09-19
Payer: MEDICARE

## 2023-09-19 ENCOUNTER — HOSPITAL ENCOUNTER (OUTPATIENT)
Dept: GENERAL RADIOLOGY | Facility: HOSPITAL | Age: 78
Discharge: HOME | End: 2023-09-19
Payer: MEDICARE

## 2023-09-19 VITALS
HEART RATE: 84 BPM | OXYGEN SATURATION: 97 % | TEMPERATURE: 98.4 F | WEIGHT: 200.25 LBS | SYSTOLIC BLOOD PRESSURE: 121 MMHG | DIASTOLIC BLOOD PRESSURE: 75 MMHG | HEIGHT: 68 IN | BODY MASS INDEX: 30.35 KG/M2

## 2023-09-19 DIAGNOSIS — M25.531 RIGHT WRIST PAIN: ICD-10-CM

## 2023-09-19 DIAGNOSIS — M19.031 ARTHRITIS OF WRIST, RIGHT: ICD-10-CM

## 2023-09-19 DIAGNOSIS — M11.231 PSEUDOGOUT OF WRIST, RIGHT: Primary | ICD-10-CM

## 2023-09-19 LAB
ALBUMIN SERPL-MCNC: 4.1 G/DL (ref 3.5–5)
ALBUMIN/GLOB SERPL: 1.3 G/DL (ref 1.1–2.5)
ALP SERPL-CCNC: 150 U/L (ref 24–120)
ALT SERPL W P-5'-P-CCNC: 21 U/L (ref 0–50)
ANION GAP SERPL CALCULATED.3IONS-SCNC: 10 MMOL/L (ref 4–13)
AST SERPL-CCNC: 23 U/L (ref 7–45)
AUTO MIXED CELLS #: 0.7 10*3/MM3 (ref 0.1–2.6)
AUTO MIXED CELLS %: 8.1 % (ref 0.1–24)
BILIRUB SERPL-MCNC: 0.7 MG/DL (ref 0.1–1)
BUN SERPL-MCNC: 20 MG/DL (ref 5–21)
BUN/CREAT SERPL: 28.2
CALCIUM SPEC-SCNC: 8.8 MG/DL (ref 8.4–10.4)
CHLORIDE SERPL-SCNC: 104 MMOL/L (ref 98–110)
CO2 SERPL-SCNC: 26 MMOL/L (ref 24–31)
CREAT SERPL-MCNC: 0.71 MG/DL (ref 0.5–1.4)
EGFRCR SERPLBLD CKD-EPI 2021: 95.1 ML/MIN/1.73
ERYTHROCYTE [DISTWIDTH] IN BLOOD BY AUTOMATED COUNT: 13.3 % (ref 12.3–15.4)
ERYTHROCYTE [SEDIMENTATION RATE] IN BLOOD: 22 MM/HR (ref 0–20)
GLOBULIN UR ELPH-MCNC: 3.1 GM/DL
GLUCOSE SERPL-MCNC: 199 MG/DL (ref 70–100)
HCT VFR BLD AUTO: 40.9 % (ref 37.5–51)
HGB BLD-MCNC: 13.4 G/DL (ref 13–17.7)
LYMPHOCYTES # BLD AUTO: 1.3 10*3/MM3 (ref 0.7–3.1)
LYMPHOCYTES NFR BLD AUTO: 15.5 % (ref 19.6–45.3)
MCH RBC QN AUTO: 27.9 PG (ref 26.6–33)
MCHC RBC AUTO-ENTMCNC: 32.8 G/DL (ref 31.5–35.7)
MCV RBC AUTO: 85.2 FL (ref 79–97)
NEUTROPHILS NFR BLD AUTO: 6.1 10*3/MM3 (ref 1.7–7)
NEUTROPHILS NFR BLD AUTO: 76.4 % (ref 42.7–76)
PLATELET # BLD AUTO: 282 10*3/MM3 (ref 140–450)
PMV BLD AUTO: 8 FL (ref 6–12)
POTASSIUM SERPL-SCNC: 4.1 MMOL/L (ref 3.5–5.3)
PROT SERPL-MCNC: 7.2 G/DL (ref 6.3–8.7)
RBC # BLD AUTO: 4.8 10*6/MM3 (ref 4.14–5.8)
SODIUM SERPL-SCNC: 140 MMOL/L (ref 135–145)
URATE SERPL-MCNC: 1.8 MG/DL (ref 3.4–7)
WBC NRBC COR # BLD: 8.1 10*3/MM3 (ref 3.4–10.8)

## 2023-09-19 PROCEDURE — 85652 RBC SED RATE AUTOMATED: CPT

## 2023-09-19 PROCEDURE — 36415 COLL VENOUS BLD VENIPUNCTURE: CPT

## 2023-09-19 PROCEDURE — 84550 ASSAY OF BLOOD/URIC ACID: CPT

## 2023-09-19 PROCEDURE — 80053 COMPREHEN METABOLIC PANEL: CPT

## 2023-09-19 PROCEDURE — 85025 COMPLETE CBC W/AUTO DIFF WBC: CPT

## 2023-09-19 PROCEDURE — 73110 X-RAY EXAM OF WRIST: CPT

## 2023-09-19 RX ORDER — PANTOPRAZOLE SODIUM 40 MG/1
TABLET, DELAYED RELEASE ORAL
COMMUNITY
Start: 2023-08-26

## 2023-09-19 RX ORDER — COLCHICINE 0.6 MG/1
TABLET ORAL
Qty: 3 TABLET | Refills: 0 | Status: SHIPPED | OUTPATIENT
Start: 2023-09-19

## 2023-09-19 RX ORDER — METHYLPREDNISOLONE 4 MG/1
TABLET ORAL
Qty: 1 EACH | Refills: 0 | Status: SHIPPED | OUTPATIENT
Start: 2023-09-19

## 2023-09-19 NOTE — PROGRESS NOTES
"Chief Complaint  Wrist Pain (Right )    Subjective        Karan Piedra presents to [facility]  History of Present Illness  Patient complains of right wrist pain that started about 3 weeks ago.   Wrist Pain       Objective   Vital Signs:  /75   Pulse 84   Temp 98.4 °F (36.9 °C)   Ht 172.7 cm (68\")   Wt 90.8 kg (200 lb 4 oz)   SpO2 97%   BMI 30.45 kg/m²   Estimated body mass index is 30.45 kg/m² as calculated from the following:    Height as of this encounter: 172.7 cm (68\").    Weight as of this encounter: 90.8 kg (200 lb 4 oz).       BMI is >= 30 and <35. (Class 1 Obesity). The following options were offered after discussion;: weight loss educational material (shared in after visit summary)      Physical Exam  Constitutional:       Appearance: Normal appearance. He is well-developed. He is obese.   HENT:      Head: Normocephalic and atraumatic.      Right Ear: External ear normal.      Left Ear: External ear normal.      Nose: Nose normal. No nasal tenderness or congestion.      Mouth/Throat:      Lips: Pink. No lesions.      Mouth: Mucous membranes are moist. No oral lesions.      Dentition: Normal dentition.      Pharynx: Oropharynx is clear. No pharyngeal swelling, oropharyngeal exudate or posterior oropharyngeal erythema.   Eyes:      General: Lids are normal. Vision grossly intact. No scleral icterus.        Right eye: No discharge.         Left eye: No discharge.      Extraocular Movements: Extraocular movements intact.      Conjunctiva/sclera: Conjunctivae normal.      Right eye: Right conjunctiva is not injected.      Left eye: Left conjunctiva is not injected.      Pupils: Pupils are equal, round, and reactive to light.   Cardiovascular:      Rate and Rhythm: Normal rate and regular rhythm.      Heart sounds: Normal heart sounds. No murmur heard.    No gallop.   Pulmonary:      Effort: Pulmonary effort is normal.      Breath sounds: Normal breath sounds and " air entry. No wheezing, rhonchi or rales.   Musculoskeletal:         General: No tenderness or deformity. Normal range of motion.      Cervical back: Full passive range of motion without pain, normal range of motion and neck supple.      Right lower leg: No edema.      Left lower leg: No edema.      Comments: Erythema and swelling to right wrist. Limited ROM d/t pain as well.    Skin:     General: Skin is warm and dry.      Coloration: Skin is not jaundiced.      Findings: No rash.   Neurological:      Mental Status: He is alert and oriented to person, place, and time.      Sensory: Sensation is intact.      Motor: Motor function is intact.      Coordination: Coordination is intact.      Gait: Gait is intact.   Psychiatric:         Attention and Perception: Attention normal.         Mood and Affect: Mood and affect normal.         Behavior: Behavior is not hyperactive. Behavior is cooperative.         Thought Content: Thought content normal.         Judgment: Judgment normal.      Result Review :  The following data was reviewed by: LUZ MARIA Cao on 09/19/2023:  Uric Acid (09/19/2023 14:26)  Sedimentation Rate (09/19/2023 14:26)  Comprehensive Metabolic Panel (09/19/2023 14:26)  CBC Auto Differential (09/19/2023 14:26)  XR Wrist 3+ View Right (09/19/2023 14:20)            Assessment and Plan   Diagnoses and all orders for this visit:    1. Pseudogout of wrist, right (Primary)  -     colchicine 0.6 MG tablet; Take 2 tabs po x 1 dose and then 1 tab po 1 hour later.  Dispense: 3 tablet; Refill: 0    2. Arthritis of wrist, right    3. Right wrist pain  -     XR Wrist 3+ View Right; Future  -     CBC Auto Differential; Future  -     Comprehensive Metabolic Panel; Future  -     Sedimentation Rate; Future  -     Uric Acid; Future  -     methylPREDNISolone (MEDROL) 4 MG dose pack; Take as directed on package instructions.  Dispense: 1 each; Refill: 0      New patient here today with complaints of right wrist pain.   He states he has had pain to his right wrist x3 weeks.  He states his wrist has been swollen, red, and slightly warm to touch.  He also states that he is experiencing pain with simple things like using a fork while eating or turning a key to unlock a door.  Denies any known injury.  He has never had issues with this in the past.  He does have a history of arthritis to his shoulders, hips, and knees.  Erythema and swelling noted to right wrist on exam.    Plan:  1.  Right wrist x-ray ordered.  2.  CBC, CMP, sed rate, and uric acid labs ordered.  3.  Steroid pack sent.  4.  Continue to use anti-inflammatory and Tylenol arthritis.  5.  Will make further recommendations when lab and x-ray results are available to review.    -- Right wrist x-ray showed arthritis to right wrist, severe in certain joints.  Mild and moderate to other parts of the wrist joint.  CBC stable, CMP shows glucose is elevated, likely not fasting.  Otherwise, stable.  Sed rate slightly elevated, and uric acid is slightly low.  We will treat as pseudogout with colchicine.  Follow-up with PCP if symptoms do not improve or become worse           Follow Up   Return if symptoms worsen or fail to improve.  Patient was given instructions and counseling regarding his condition or for health maintenance advice. Please see specific information pulled into the AVS if appropriate.

## 2023-09-19 NOTE — PROGRESS NOTES
CMP glucose elevated, likely not fasting. Otherwise stable  CBC- stable  Sed rate slightly elevated  Uric acid is still pending.

## 2023-09-19 NOTE — PROGRESS NOTES
Uric acid is low. Will treat as pseudogout. Will add colchicine. Take 2 tabs po x 1 dose and repeat with 1 tab po 1 hour later. Continue with other tx discussed in office as well. F/u with PCP if no improvements.

## 2023-09-20 ENCOUNTER — TELEPHONE (OUTPATIENT)
Dept: FAMILY MEDICINE CLINIC | Facility: CLINIC | Age: 78
End: 2023-09-20
Payer: MEDICARE

## 2023-09-20 ENCOUNTER — PATIENT ROUNDING (BHMG ONLY) (OUTPATIENT)
Dept: FAMILY MEDICINE CLINIC | Facility: CLINIC | Age: 78
End: 2023-09-20
Payer: MEDICARE

## 2023-09-20 NOTE — TELEPHONE ENCOUNTER
Left message for call back    ----- Message from LUZ MARIA Lake sent at 9/19/2023  4:26 PM CDT -----  Uric acid is low. Will treat as pseudogout. Will add colchicine. Take 2 tabs po x 1 dose and repeat with 1 tab po 1 hour later. Continue with other tx discussed in office as well. F/u with PCP if no improvements.

## 2023-09-20 NOTE — TELEPHONE ENCOUNTER
Called patient and confirmed date of birth. Relayed results/recommendations. Pt verbally understood all, no questions at this time.     ----- Message from LUZ MARIA Lake sent at 9/19/2023  4:26 PM CDT -----  Uric acid is low. Will treat as pseudogout. Will add colchicine. Take 2 tabs po x 1 dose and repeat with 1 tab po 1 hour later. Continue with other tx discussed in office as well. F/u with PCP if no improvements.

## 2023-09-20 NOTE — PROGRESS NOTES
September 20, 2023    Hello, may I speak with Karan Piedra?    My name is Sherlyn      I am  with [facility]  [facility]  [address]  [place] [address] [phone].    Before we get started may I verify your date of birth? 1945    I am calling to officially welcome you to our practice and ask about your recent visit. Is this a good time to talk? Yes. Just to clear up any confusion...you use the word welcome but I still plan on seeing Dr. Feliz for my primary care.     Tell me about your visit with us. What things went well?  You all were really GREAT! My wife had been in several day prior and just sang ya'lls praises! Everyone was so nice and accomodating. Mary was a very nice young lady.       We're always looking for ways to make our patients' experiences even better. Do you have recommendations on ways we may improve?  no    Overall were you satisfied with your first visit to our practice? yes       I appreciate you taking the time to speak with me today. Is there anything else I can do for you? no      Thank you, and have a great day.

## 2023-09-20 NOTE — TELEPHONE ENCOUNTER
"Hub staff attempted to follow warm transfer process and was unsuccessful     Caller: Karan Piedra \"Bill\"    Relationship to patient: Self    Best call back number: [phone]    Patient is needing: TO RETURN A CALL FROM [place]  "

## 2023-10-30 ENCOUNTER — OFFICE VISIT (OUTPATIENT)
Dept: FAMILY MEDICINE CLINIC | Facility: CLINIC | Age: 78
End: 2023-10-30
Payer: MEDICARE

## 2023-10-30 ENCOUNTER — HOSPITAL ENCOUNTER (OUTPATIENT)
Dept: GENERAL RADIOLOGY | Facility: HOSPITAL | Age: 78
Discharge: HOME | End: 2023-10-30
Admitting: NURSE PRACTITIONER
Payer: MEDICARE

## 2023-10-30 ENCOUNTER — TELEPHONE (OUTPATIENT)
Dept: FAMILY MEDICINE CLINIC | Facility: CLINIC | Age: 78
End: 2023-10-30
Payer: MEDICARE

## 2023-10-30 VITALS
DIASTOLIC BLOOD PRESSURE: 73 MMHG | RESPIRATION RATE: 20 BRPM | HEART RATE: 72 BPM | SYSTOLIC BLOOD PRESSURE: 159 MMHG | BODY MASS INDEX: 30.62 KG/M2 | WEIGHT: 202 LBS | HEIGHT: 68 IN

## 2023-10-30 DIAGNOSIS — M25.532 LEFT WRIST PAIN: ICD-10-CM

## 2023-10-30 DIAGNOSIS — M11.231 PSEUDOGOUT OF WRIST, RIGHT: Primary | ICD-10-CM

## 2023-10-30 DIAGNOSIS — M19.032 OSTEOARTHRITIS OF LEFT WRIST, UNSPECIFIED OSTEOARTHRITIS TYPE: ICD-10-CM

## 2023-10-30 DIAGNOSIS — E66.9 CLASS 1 OBESITY WITH BODY MASS INDEX (BMI) OF 30.0 TO 30.9 IN ADULT, UNSPECIFIED OBESITY TYPE, UNSPECIFIED WHETHER SERIOUS COMORBIDITY PRESENT: ICD-10-CM

## 2023-10-30 PROCEDURE — 99213 OFFICE O/P EST LOW 20 MIN: CPT | Performed by: NURSE PRACTITIONER

## 2023-10-30 PROCEDURE — 1159F MED LIST DOCD IN RCRD: CPT | Performed by: NURSE PRACTITIONER

## 2023-10-30 PROCEDURE — 1160F RVW MEDS BY RX/DR IN RCRD: CPT | Performed by: NURSE PRACTITIONER

## 2023-10-30 PROCEDURE — 3077F SYST BP >= 140 MM HG: CPT | Performed by: NURSE PRACTITIONER

## 2023-10-30 PROCEDURE — 3078F DIAST BP <80 MM HG: CPT | Performed by: NURSE PRACTITIONER

## 2023-10-30 PROCEDURE — 73110 X-RAY EXAM OF WRIST: CPT

## 2023-10-30 RX ORDER — METHYLPREDNISOLONE 4 MG/1
TABLET ORAL
Qty: 1 EACH | Refills: 0 | Status: SHIPPED | OUTPATIENT
Start: 2023-10-30

## 2023-10-30 RX ORDER — COLCHICINE 0.6 MG/1
TABLET ORAL
Qty: 3 TABLET | Refills: 0 | Status: SHIPPED | OUTPATIENT
Start: 2023-10-30

## 2023-10-30 NOTE — PROGRESS NOTES
"Chief Complaint  Wrist Pain    Subjective    History of Present Illness      Patient presents to [facility] for   History of Present Illness  C/o L wrist pain. Already treated his R wrist with steroid pack and tylenol arthritis. Nothing acute noted on R wrist xray. He states he is now having severe L wrist pain. He had an old rx of pain medication that he had to take last night due to the pain        Review of Systems   Constitutional: Negative.    HENT: Negative.     Eyes: Negative.    Respiratory: Negative.     Cardiovascular: Negative.    Gastrointestinal: Negative.    Endocrine: Negative.    Genitourinary: Negative.    Musculoskeletal:         Left wrist   Skin: Negative.    Allergic/Immunologic: Negative.    Neurological: Negative.    Hematological: Negative.    Psychiatric/Behavioral: Negative.         I have reviewed and agree with the HPI and ROS information as above.  Stella Garcia, APRN     Objective   Vital Signs:   /73   Pulse 72   Resp 20   Ht 172.7 cm (68\")   Wt 91.6 kg (202 lb)   BMI 30.71 kg/m²            Physical Exam  Constitutional:       Appearance: Normal appearance. He is well-developed. He is obese.   HENT:      Head: Normocephalic and atraumatic.      Right Ear: External ear normal.      Left Ear: External ear normal.      Nose: Nose normal. No nasal tenderness or congestion.      Mouth/Throat:      Lips: Pink. No lesions.      Mouth: Mucous membranes are moist. No oral lesions.      Dentition: Normal dentition.      Pharynx: Oropharynx is clear. No pharyngeal swelling, oropharyngeal exudate or posterior oropharyngeal erythema.   Eyes:      General: Lids are normal. Vision grossly intact. No scleral icterus.        Right eye: No discharge.         Left eye: No discharge.      Extraocular Movements: Extraocular movements intact.      Conjunctiva/sclera: Conjunctivae normal.      Right eye: Right conjunctiva is not injected.      Left eye: Left " conjunctiva is not injected.      Pupils: Pupils are equal, round, and reactive to light.   Cardiovascular:      Rate and Rhythm: Normal rate and regular rhythm.      Heart sounds: Normal heart sounds. No murmur heard.     No gallop.   Pulmonary:      Effort: Pulmonary effort is normal.      Breath sounds: Normal breath sounds and air entry. No wheezing, rhonchi or rales.   Musculoskeletal:         General: No tenderness or deformity. Normal range of motion.      Cervical back: Full passive range of motion without pain, normal range of motion and neck supple.      Right lower leg: No edema.      Left lower leg: No edema.      Comments: Swelling and tenderness to left wrist   Skin:     General: Skin is warm and dry.      Coloration: Skin is not jaundiced.      Findings: No rash.   Neurological:      Mental Status: He is alert and oriented to person, place, and time.      Sensory: Sensation is intact.      Motor: Motor function is intact.      Coordination: Coordination is intact.      Gait: Gait is intact.   Psychiatric:         Attention and Perception: Attention normal.         Mood and Affect: Mood and affect normal.         Behavior: Behavior is not hyperactive. Behavior is cooperative.         Thought Content: Thought content normal.         Judgment: Judgment normal.          LEO-7:      PHQ-2 Depression Screening  Little interest or pleasure in doing things? 0-->not at all   Feeling down, depressed, or hopeless? 0-->not at all   PHQ-2 Total Score 0     PHQ-9 Depression Screening  Little interest or pleasure in doing things? 0-->not at all   Feeling down, depressed, or hopeless? 0-->not at all   Trouble falling or staying asleep, or sleeping too much?     Feeling tired or having little energy?     Poor appetite or overeating?     Feeling bad about yourself - or that you are a failure or have let yourself or your family down?     Trouble concentrating on things, such as reading the newspaper or watching  television?     Moving or speaking so slowly that other people could have noticed? Or the opposite - being so fidgety or restless that you have been moving around a lot more than usual?     Thoughts that you would be better off dead, or of hurting yourself in some way?     PHQ-9 Total Score 0   If you checked off any problems, how difficult have these problems made it for you to do your work, take care of things at home, or get along with other people?        Result Review  Data Reviewed:   The following data was reviewed by: LUZ MARIA Cao on 10/30/2023:  XR Wrist 3+ View Left (10/30/2023 11:34)            Assessment and Plan      Diagnoses and all orders for this visit:    1. Pseudogout of wrist, right (Primary)  -     colchicine 0.6 MG tablet; Take 2 tabs po x 1 dose and then 1 tab po 1 hour later.  Dispense: 3 tablet; Refill: 0    2. Osteoarthritis of left wrist, unspecified osteoarthritis type    3. Left wrist pain  -     methylPREDNISolone (MEDROL) 4 MG dose pack; Take as directed on package instructions.  Dispense: 1 each; Refill: 0  -     colchicine 0.6 MG tablet; Take 2 tabs po x 1 dose and then 1 tab po 1 hour later.  Dispense: 3 tablet; Refill: 0  -     XR Wrist 3+ View Left; Future    4. Class 1 obesity with body mass index (BMI) of 30.0 to 30.9 in adult, unspecified obesity type, unspecified whether serious comorbidity present      Patient here today with left wrist pain.  He states his symptoms began a couple of days ago.  His pain becomes severe last night he was not able to sleep.  He had an issue like this about 1 month ago in his right wrist.  X-rays were completed at that time which showed arthritis.  He was treated for pseudogout with colchicine and a steroid pack which improved his symptoms.  He does admit to having issues with joint pain in his knees, shoulders, and hips.  There is swelling, slight redness, and tenderness noted on exam to his left wrist.    Plan:    1.  Left wrist  x-ray ordered.  2.  Colchicine sent.  3.  Steroid pack sent.  4.  Continue to use anti-inflammatory and Tylenol arthritis.  5.  We will make further recommendations when x-ray results are available.  6.  Encourage patient to discuss these symptoms of joint pain at multiple sites with his PCP to see if further work-up or treatment is needed.   7.  Follow-up with PCP for continued or worsening symptoms.    --Left wrist x-ray shows high-grade osteoarthritic changes at the first carpal metacarpal junction.  Soft tissue edema noted.  No fractures or dislocations noted.    Follow Up   Return if symptoms worsen or fail to improve.  Patient was given instructions and counseling regarding his condition or for health maintenance advice. Please see specific information pulled into the AVS if appropriate.

## 2023-10-30 NOTE — TELEPHONE ENCOUNTER
----- Message from LUZ MARIA Lake sent at 10/30/2023 12:52 PM CDT -----  High grade osteoarthritis changes at the first carpal metacarpal junction. This was seen 2 years ago and it appears similar. Soft tissue swelling noted.

## 2023-10-30 NOTE — PROGRESS NOTES
High grade osteoarthritis changes at the first carpal metacarpal junction. This was seen 2 years ago and it appears similar. Soft tissue swelling noted.

## 2023-11-08 NOTE — PROGRESS NOTES
Chief Complaint  Prostate Cancer    Subjective          Karan Piedra presents to [facility] to follow-up prostate cancer.  Prior history includes  -11/13/2018: Robot-assisted laparoscopic prostatectomy; Aliza 3+ 4 = 7; pT2 with negative margins  Patient denies any possible systemic symptoms of prostate cancer such as weight loss, lower extremity edema, or skeletal pain that could be worrisome for systemic disease.  Continence is good.  No longer gets erections.  Did not respond to PDE 5 inhibitors.  Declined penile injections or penile implant      Current Outpatient Medications:     aspirin 81 MG EC tablet, Take 1 tablet by mouth Daily. On hold for surgery, Disp: , Rfl:     colchicine 0.6 MG tablet, Take 2 tabs po x 1 dose and then 1 tab po 1 hour later., Disp: 3 tablet, Rfl: 0    hydroCHLOROthiazide (HYDRODIURIL) 25 MG tablet, Take 1 tablet by mouth Daily., Disp: , Rfl:     irbesartan (AVAPRO) 300 MG tablet, Take 1 tablet by mouth Daily., Disp: , Rfl:     meloxicam (MOBIC) 7.5 MG tablet, Take 1 tablet by mouth Daily., Disp: , Rfl:     methylPREDNISolone (MEDROL) 4 MG dose pack, Take as directed on package instructions., Disp: 1 each, Rfl: 0    Multiple Vitamins-Minerals (VISION-KATARINA PRESERVE PO), Take 1 tablet by mouth Daily., Disp: , Rfl:     pantoprazole (PROTONIX) 40 MG EC tablet, TAKE 1 TABLET BY MOUTH ONCE DAILY BEFORE MEAL(S), Disp: , Rfl:     rosuvastatin (CRESTOR) 20 MG tablet, Take 1 tablet by mouth Daily., Disp: , Rfl:   Past Medical History:   Diagnosis Date    Arthritis     Colon polyps     High cholesterol     Hypertension     Mycobacterium avium complex     Prostate CA     PSA elevation     Seasonal allergies      Past Surgical History:   Procedure Laterality Date    COLONOSCOPY  08/10/2016    five 5 mm polyps in the transverse colon,at the hepatic flexure and in the cecum  resected and retrieved    COLONOSCOPY N/A 10/19/2021    Procedure: COLONOSCOPY WITH  "ANESTHESIA;  Surgeon: Scotty Jorgensen DO;  Location:  [facility];  Service: Gastroenterology;  Laterality: N/A;  pre adenomatous polyp  post polyps  Ozzie Feliz MD    COLONOSCOPY N/A 10/20/2021    Procedure: COLONOSCOPY WITH ANESTHESIA;  Surgeon: Scotty Jorgensen DO;  Location:  [facility];  Service: Gastroenterology;  Laterality: N/A;  pre: hx polyps  post: diverticulosis  Ozzie Feliz MD    HERNIA REPAIR      abdominal    PROSTATE BIOPSY N/A 8/7/2018    Procedure: PROSTATE ULTRASOUND  URONAV FUSION BIOPSY;  Surgeon: Chemo Tierney MD;  Location[facility] OR;  Service: Urology    PROSTATE ULTRASOUND BIOPSY  12/2016    negative    PROSTATECTOMY N/A 11/13/2018    Procedure: PROSTATECTOMY LAPAROSCOPIC WITH DAVINCI SI ROBOT WITH PELVIC LYMPH NODE DISSECTION;  Surgeon: Chemo Tierney MD;  Location:  [facility] OR;  Service: DaVinci    ROTATOR CUFF REPAIR Bilateral     SKIN LESION EXCISION Right 2/15/2022    Procedure: 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 with superior M-plasty closure.;  Surgeon: Ye Argueta MD;  Location:  [facility] OR;  Service: ENT;  Laterality: Right;    TONSILLECTOMY             Review of Systems      Objective   PHYSICAL EXAM  Vital Signs:   Temp 97.1 °F (36.2 °C)   Ht 172.7 cm (68\")   Wt 91.6 kg (202 lb)   BMI 30.71 kg/m²     Physical Exam      DATA  Result Review :              Results for orders placed or performed in visit on 11/16/23   POC Urinalysis Dipstick, Multipro    Specimen: Urine   Result Value Ref Range    Color Yellow Yellow, Straw, Dark Yellow, Soni    Clarity, UA Clear Clear    Glucose, UA Negative Negative mg/dL    Bilirubin Negative Negative    Ketones, UA Negative Negative    Specific Gravity  1.020 1.005 - 1.030    Blood, UA Negative Negative    pH, Urine 7.0 5.0 - 8.0    Protein, POC Negative Negative mg/dL    Urobilinogen, UA 2.0 E.U./dL (A) Normal, 0.2 E.U./dL    Nitrite, UA Negative Negative    " Leukocytes Negative Negative     Lab Results   Component Value Date    PSA <0.014 11/09/2023    PSA <0.014 05/09/2023    PSA <0.014 11/08/2022          ASSESSMENT AND PLAN          Problem List Items Addressed This Visit          Hematology and Neoplasia    Prostate cancer - Primary    Relevant Orders    POC Urinalysis Dipstick, Multipro (Completed)    PSA DIAGNOSTIC   PSA is <0.2 which is indicative of MARTHA status.            FOLLOW UP     Return in about 1 year (around 11/16/2024) for PSA before visit.        (Please note that portions of this note were completed with a voice recognition program.)  Chemo Tierney MD  11/17/23  08:27 CST

## 2023-11-09 ENCOUNTER — LAB (OUTPATIENT)
Dept: UROLOGY | Facility: CLINIC | Age: 78
End: 2023-11-09
Payer: MEDICARE

## 2023-11-09 DIAGNOSIS — C61 PROSTATE CANCER: ICD-10-CM

## 2023-11-10 LAB — PSA SERPL-MCNC: <0.014 NG/ML (ref 0–4)

## 2023-11-16 ENCOUNTER — OFFICE VISIT (OUTPATIENT)
Dept: UROLOGY | Facility: CLINIC | Age: 78
End: 2023-11-16
Payer: MEDICARE

## 2023-11-16 VITALS — TEMPERATURE: 97.1 F | BODY MASS INDEX: 30.62 KG/M2 | WEIGHT: 202 LBS | HEIGHT: 68 IN

## 2023-11-16 DIAGNOSIS — C61 PROSTATE CANCER: Primary | ICD-10-CM

## 2023-11-16 PROCEDURE — 99213 OFFICE O/P EST LOW 20 MIN: CPT | Performed by: UROLOGY

## 2023-11-16 PROCEDURE — 1159F MED LIST DOCD IN RCRD: CPT | Performed by: UROLOGY

## 2023-11-16 PROCEDURE — 1160F RVW MEDS BY RX/DR IN RCRD: CPT | Performed by: UROLOGY

## 2023-11-16 PROCEDURE — 81003 URINALYSIS AUTO W/O SCOPE: CPT | Performed by: UROLOGY

## 2023-12-07 ENCOUNTER — TRANSCRIBE ORDERS (OUTPATIENT)
Dept: ADMINISTRATIVE | Facility: HOSPITAL | Age: 78
End: 2023-12-07
Payer: MEDICARE

## 2023-12-07 DIAGNOSIS — M62.81 MUSCLE WEAKNESS (GENERALIZED): Primary | ICD-10-CM

## 2023-12-11 ENCOUNTER — TELEPHONE (OUTPATIENT)
Dept: UROLOGY | Facility: CLINIC | Age: 78
End: 2023-12-11
Payer: MEDICARE

## 2023-12-11 NOTE — TELEPHONE ENCOUNTER
Provider: DR. HEATH    Caller: FRANC    Relationship to Patient: Provider     Phone Number: [phone]    Reason for Call: TRAVIS UPDATE    Notes: FRANC WITH [facility] CALLED TO CHECK THE STATUS OF FAXED REQUEST.

## 2023-12-18 ENCOUNTER — TELEPHONE (OUTPATIENT)
Dept: UROLOGY | Facility: CLINIC | Age: 78
End: 2023-12-18

## 2023-12-18 NOTE — TELEPHONE ENCOUNTER
Provider: DR HEATH    Caller: RAMBO    Relationship to Patient: PROVIDER - HOME HEALTH    Reason for Call: SENDING FORMS FOR APPROVAL FOR ADD INSUR THROUGH DEPT OF LABOR.    When was the patient last seen: 11/16/23    FORMS WILL BE FAXED TO OFFICE FAX, PLEASE CALL IF ANY QUESTIONS REGARDING APPROVAL. # [phone]

## 2024-01-17 ENCOUNTER — TELEPHONE (OUTPATIENT)
Dept: NEUROLOGY | Facility: HOSPITAL | Age: 79
End: 2024-01-17
Payer: MEDICARE

## 2024-04-11 ENCOUNTER — TELEPHONE (OUTPATIENT)
Dept: UROLOGY | Facility: CLINIC | Age: 79
End: 2024-04-11
Payer: MEDICARE

## 2024-04-11 NOTE — TELEPHONE ENCOUNTER
Patient stopped by the office and dropped off a folder with paperwork regarding his prostate cancer. He is a patient of Dr. Tierney. I will deliver the paperwork to Kareem.

## 2024-04-15 ENCOUNTER — TELEPHONE (OUTPATIENT)
Dept: UROLOGY | Facility: CLINIC | Age: 79
End: 2024-04-15

## 2024-04-15 NOTE — TELEPHONE ENCOUNTER
[facility] received a fax that requires your attention. The document has been indexed to the patient’s chart for your review.      Reason for sending: NEEDS PROVIDER REVIEW AND SIGNATURE    Documents Description: WORKER'S COMPENSATION LETTER    Name of Sender: ANTONINA ASHFORD    Date Indexed:04/15/24

## 2024-04-16 ENCOUNTER — TELEPHONE (OUTPATIENT)
Dept: UROLOGY | Facility: CLINIC | Age: 79
End: 2024-04-16
Payer: MEDICARE

## 2024-08-14 ENCOUNTER — TELEPHONE (OUTPATIENT)
Dept: UROLOGY | Facility: CLINIC | Age: 79
End: 2024-08-14
Payer: MEDICARE

## 2024-08-14 NOTE — TELEPHONE ENCOUNTER
Gilda Astudillo, representative of patient from [facility], stopped by to drop off some paperwork that needed to be signed by Dr. Tierney for patient.  I gave the paperwork to the clinical department to be reviewed and followed up on.

## 2024-10-14 ENCOUNTER — HOSPITAL ENCOUNTER (OUTPATIENT)
Dept: GENERAL RADIOLOGY | Facility: HOSPITAL | Age: 79
Discharge: HOME | End: 2024-10-14
Admitting: INTERNAL MEDICINE
Payer: MEDICARE

## 2024-10-14 ENCOUNTER — TRANSCRIBE ORDERS (OUTPATIENT)
Dept: ADMINISTRATIVE | Facility: HOSPITAL | Age: 79
End: 2024-10-14
Payer: MEDICARE

## 2024-10-14 DIAGNOSIS — M54.6 PAIN IN THORACIC SPINE: Primary | ICD-10-CM

## 2024-10-14 PROCEDURE — 72072 X-RAY EXAM THORAC SPINE 3VWS: CPT

## 2024-10-21 ENCOUNTER — TRANSCRIBE ORDERS (OUTPATIENT)
Dept: ADMINISTRATIVE | Facility: HOSPITAL | Age: 79
End: 2024-10-21
Payer: MEDICARE

## 2024-10-21 DIAGNOSIS — R10.9 ABDOMINAL PAIN, UNSPECIFIED ABDOMINAL LOCATION: Primary | ICD-10-CM

## 2024-11-01 ENCOUNTER — HOSPITAL ENCOUNTER (OUTPATIENT)
Dept: CT IMAGING | Facility: HOSPITAL | Age: 79
Discharge: HOME | End: 2024-11-01
Payer: MEDICARE

## 2024-11-01 DIAGNOSIS — R10.9 ABDOMINAL PAIN, UNSPECIFIED ABDOMINAL LOCATION: ICD-10-CM

## 2024-11-01 PROCEDURE — 25510000001 IOPAMIDOL 61 % SOLUTION: Performed by: INTERNAL MEDICINE

## 2024-11-01 PROCEDURE — 74177 CT ABD & PELVIS W/CONTRAST: CPT

## 2024-11-01 RX ORDER — IOPAMIDOL 612 MG/ML
100 INJECTION, SOLUTION INTRAVASCULAR
Status: COMPLETED | OUTPATIENT
Start: 2024-11-01 | End: 2024-11-01

## 2024-11-01 RX ADMIN — IOPAMIDOL 100 ML: 612 INJECTION, SOLUTION INTRAVENOUS at 10:30

## 2024-11-05 NOTE — PROGRESS NOTES
Chief Complaint  Prostate Cancer     Subjective          Karan Piedra presents to [facility] UROLOG[facility] to follow-up prostate cancer.  He is now 6 years status post prostatectomy.Patient denies any possible systemic symptoms of prostate cancer such as weight loss, lower extremity edema, or skeletal pain that could be worrisome for systemic disease.  Having significant issues with nocturia, urgency, and frequency.  He even describes a couple episodes of near incontinence.  Limits his caffeine already.      -11/13/2018: Robot-assisted laparoscopic prostatectomy; [place] 3+ 4 = 7; pT2 with negative margins             Current Outpatient Medications:     aspirin 81 MG EC tablet, Take 1 tablet by mouth Daily. On hold for surgery, Disp: , Rfl:     hydroCHLOROthiazide (HYDRODIURIL) 25 MG tablet, Take 1 tablet by mouth Daily., Disp: , Rfl:     HYDROcodone-acetaminophen (NORCO) 5-325 MG per tablet, , Disp: , Rfl:     irbesartan (AVAPRO) 300 MG tablet, Take 1 tablet by mouth Daily., Disp: , Rfl:     meloxicam (MOBIC) 7.5 MG tablet, Take 1 tablet by mouth Daily., Disp: , Rfl:     pantoprazole (PROTONIX) 40 MG EC tablet, TAKE 1 TABLET BY MOUTH ONCE DAILY BEFORE MEAL(S), Disp: , Rfl:     rosuvastatin (CRESTOR) 10 MG tablet, , Disp: , Rfl:     tiZANidine (ZANAFLEX) 4 MG tablet, Take 1 tablet by mouth Every 6 (Six) Hours As Needed., Disp: , Rfl:     Mirabegron ER (MYRBETRIQ) 25 MG tablet sustained-release 24 hour 24 hr tablet, Take 1 tablet by mouth Daily., Disp: 30 tablet, Rfl: 11  Past Medical History:   Diagnosis Date    Arthritis     Colon polyps     High cholesterol     Hypertension     Mycobacterium avium complex     Prostate CA     PSA elevation     Seasonal allergies      Past Surgical History:   Procedure Laterality Date    COLONOSCOPY  08/10/2016    five 5 mm polyps in the transverse colon,at the hepatic flexure and in the cecum  resected and retrieved    COLONOSCOPY N/A 10/19/2021    Procedure:  "COLONOSCOPY WITH ANESTHESIA;  Surgeon: Scotty Jorgensen DO;  Location:  [facility];  Service: Gastroenterology;  Laterality: N/A;  pre adenomatous polyp  post polyps  Ozzie Feliz MD    COLONOSCOPY N/A 10/20/2021    Procedure: COLONOSCOPY WITH ANESTHESIA;  Surgeon: Scotty Jorgensen DO;  Location:  [facility];  Service: Gastroenterology;  Laterality: N/A;  pre: hx polyps  post: diverticulosis  Ozzie Feliz MD    HERNIA REPAIR      abdominal    PROSTATE BIOPSY N/A 8/7/2018    Procedure: PROSTATE ULTRASOUND  URONAV FUSION BIOPSY;  Surgeon: Chemo Tierney MD;  Location:  [facility] OR;  Service: Urology    PROSTATE ULTRASOUND BIOPSY  12/2016    negative    PROSTATECTOMY N/A 11/13/2018    Procedure: PROSTATECTOMY LAPAROSCOPIC WITH DAVINCI SI ROBOT WITH PELVIC LYMPH NODE DISSECTION;  Surgeon: Chemo Tierney MD;  Location:  [facility] OR;  Service: DaVinci    ROTATOR CUFF REPAIR Bilateral     SKIN LESION EXCISION Right 2/15/2022    Procedure: 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 with superior M-plasty closure.;  Surgeon: Ye Argueta MD;  Location:  [facility] OR;  Service: ENT;  Laterality: Right;    TONSILLECTOMY             Review of Systems      Objective   PHYSICAL EXAM  Vital Signs:   Temp 97.6 °F (36.4 °C)   Ht 172.7 cm (68\")   Wt 94 kg (207 lb 3.2 oz)   BMI 31.50 kg/m²     Physical Exam      DATA  Result Review :              Results for orders placed or performed in visit on 11/14/24   POC Urinalysis Dipstick, Multipro    Collection Time: 11/14/24 10:29 AM    Specimen: Urine   Result Value Ref Range    Color Yellow Yellow, Straw, Dark Yellow, Soni    Clarity, UA Clear Clear    Glucose, UA Negative Negative mg/dL    Bilirubin Negative Negative    Ketones, UA Negative Negative    Specific Gravity  1.020 1.005 - 1.030    Blood, UA Negative Negative    pH, Urine 6.0 5.0 - 8.0    Protein, POC Negative Negative mg/dL    Urobilinogen, UA 1 E.U./dL (A) " Normal, 0.2 E.U./dL    Nitrite, UA Negative Negative    Leukocytes Negative Negative           Lab Results   Component Value Date    PSA <0.014 11/07/2024    PSA <0.014 11/09/2023    PSA <0.014 05/09/2023          ASSESSMENT AND PLAN          Problem List Items Addressed This Visit          Hematology and Neoplasia    Prostate cancer - Primary    Relevant Orders    POC Urinalysis Dipstick, Multipro (Completed)     Other Visit Diagnoses       Urinary urgency        Relevant Medications    Mirabegron ER (MYRBETRIQ) 25 MG tablet sustained-release 24 hour 24 hr tablet          PSA is <0.2 which is indicative of MARTHA status.  Would like to start a trial of mirabegron at 25 mg daily.  He can contact me if this fails to work or there is issues with his insurance covering it.        FOLLOW UP     Return in about 6 months (around 5/14/2025).        (Please note that portions of this note were completed with a voice recognition program.)  Chemo Tierney MD  11/15/24  07:30 CST

## 2024-11-07 ENCOUNTER — LAB (OUTPATIENT)
Dept: LAB | Facility: HOSPITAL | Age: 79
End: 2024-11-07
Payer: MEDICARE

## 2024-11-07 DIAGNOSIS — C61 PROSTATE CANCER: ICD-10-CM

## 2024-11-07 LAB — PSA SERPL-MCNC: <0.014 NG/ML (ref 0–4)

## 2024-11-07 PROCEDURE — 84153 ASSAY OF PSA TOTAL: CPT

## 2024-11-07 PROCEDURE — 36415 COLL VENOUS BLD VENIPUNCTURE: CPT

## 2024-11-14 ENCOUNTER — OFFICE VISIT (OUTPATIENT)
Dept: UROLOGY | Facility: CLINIC | Age: 79
End: 2024-11-14
Payer: MEDICARE

## 2024-11-14 VITALS — BODY MASS INDEX: 31.4 KG/M2 | TEMPERATURE: 97.6 F | WEIGHT: 207.2 LBS | HEIGHT: 68 IN

## 2024-11-14 DIAGNOSIS — R39.15 URINARY URGENCY: ICD-10-CM

## 2024-11-14 DIAGNOSIS — C61 PROSTATE CANCER: Primary | ICD-10-CM

## 2024-11-14 LAB
BILIRUB BLD-MCNC: NEGATIVE MG/DL
CLARITY, POC: CLEAR
COLOR UR: YELLOW
GLUCOSE UR STRIP-MCNC: NEGATIVE MG/DL
KETONES UR QL: NEGATIVE
LEUKOCYTE EST, POC: NEGATIVE
NITRITE UR-MCNC: NEGATIVE MG/ML
PH UR: 6 [PH] (ref 5–8)
PROT UR STRIP-MCNC: NEGATIVE MG/DL
RBC # UR STRIP: NEGATIVE /UL
SP GR UR: 1.02 (ref 1–1.03)
UROBILINOGEN UR QL: ABNORMAL

## 2024-11-14 RX ORDER — HYDROCODONE BITARTRATE AND ACETAMINOPHEN 5; 325 MG/1; MG/1
TABLET ORAL
COMMUNITY
Start: 2024-11-13

## 2024-11-14 RX ORDER — MIRABEGRON 25 MG/1
25 TABLET, FILM COATED, EXTENDED RELEASE ORAL DAILY
Qty: 30 TABLET | Refills: 11 | Status: SHIPPED | OUTPATIENT
Start: 2024-11-14

## 2024-11-14 RX ORDER — ROSUVASTATIN CALCIUM 10 MG/1
TABLET, COATED ORAL
COMMUNITY
Start: 2024-10-15

## 2024-11-15 ENCOUNTER — TRANSCRIBE ORDERS (OUTPATIENT)
Dept: ADMINISTRATIVE | Facility: HOSPITAL | Age: 79
End: 2024-11-15
Payer: MEDICARE

## 2024-11-15 DIAGNOSIS — S22.080A CLOSED WEDGE COMPRESSION FRACTURE OF T11 VERTEBRA, INITIAL ENCOUNTER: Primary | ICD-10-CM

## 2024-11-23 ENCOUNTER — HOSPITAL ENCOUNTER (OUTPATIENT)
Dept: MRI IMAGING | Facility: HOSPITAL | Age: 79
Discharge: HOME | End: 2024-11-23
Payer: MEDICARE

## 2024-11-23 DIAGNOSIS — S22.080A CLOSED WEDGE COMPRESSION FRACTURE OF T11 VERTEBRA, INITIAL ENCOUNTER: ICD-10-CM

## 2024-11-23 PROCEDURE — 72146 MRI CHEST SPINE W/O DYE: CPT

## 2025-01-08 ENCOUNTER — TELEPHONE (OUTPATIENT)
Dept: NEUROSURGERY | Facility: CLINIC | Age: 80
End: 2025-01-08
Payer: MEDICARE

## 2025-01-08 NOTE — TELEPHONE ENCOUNTER
Patient referred to our clinic for a compression fracture.  Dr Santana can see the patient tomorrow.  I called the patient to make an appointment but he didn't answer so I left a VM asking him to call me back.  I called the patient's wife and she did answer.  I have given her the appt information.        ,BRITTANI ESCOTO [facility]  CLINICAL COORDINATOR  [facility] [place] [facility]      IT IS OKAY FOR THE HUB TO DELIVER THIS INFORMATION TO THE PATIENT IF THEY RECEIVE THIS CALL BACK

## 2025-01-09 ENCOUNTER — OFFICE VISIT (OUTPATIENT)
Dept: NEUROSURGERY | Facility: CLINIC | Age: 80
End: 2025-01-09
Payer: MEDICARE

## 2025-01-09 VITALS — HEIGHT: 68 IN | WEIGHT: 202 LBS | BODY MASS INDEX: 30.62 KG/M2

## 2025-01-09 DIAGNOSIS — E66.09 CLASS 1 OBESITY DUE TO EXCESS CALORIES WITH SERIOUS COMORBIDITY AND BODY MASS INDEX (BMI) OF 30.0 TO 30.9 IN ADULT: ICD-10-CM

## 2025-01-09 DIAGNOSIS — C61 PROSTATE CANCER: ICD-10-CM

## 2025-01-09 DIAGNOSIS — M54.50 ACUTE MIDLINE LOW BACK PAIN WITHOUT SCIATICA: Primary | ICD-10-CM

## 2025-01-09 DIAGNOSIS — S22.080A CLOSED WEDGE COMPRESSION FRACTURE OF T12 VERTEBRA, INITIAL ENCOUNTER: ICD-10-CM

## 2025-01-09 DIAGNOSIS — E66.811 CLASS 1 OBESITY DUE TO EXCESS CALORIES WITH SERIOUS COMORBIDITY AND BODY MASS INDEX (BMI) OF 30.0 TO 30.9 IN ADULT: ICD-10-CM

## 2025-01-09 DIAGNOSIS — Z78.9 NON-SMOKER: ICD-10-CM

## 2025-01-09 NOTE — PATIENT INSTRUCTIONS
"PATIENT TO CONTINUE TO FOLLOW UP WITH HIS PRIMARY CARE PROVIDER FOR YEARLY PHYSICAL EXAMS TO ENSURE COMPLETE HEALTH MAINTENANCE    BMI for Adults  Body mass index (BMI) is a number found using a person's weight and height. BMI can help tell how much of a person's weight is made up of fat. BMI does not measure body fat directly. It is used instead of tests that directly measure body fat, which can be difficult and expensive.  What are BMI measurements used for?  BMI is useful to:  Find out if your weight puts you at higher risk for medical problems.  Help recommend changes, such as in diet and exercise. This can help you reach a healthy weight. BMI screening can be done again to see if these changes are working.  How is BMI calculated?  Your height and weight are measured. The BMI is found from those numbers. This can be done with U.S[place] or metric measurements. Note that charts and online BMI calculators are available to help you find your BMI quickly and easily without doing these calculations.  To calculate your BMI in U.S. measurements:  Measure your weight in pounds (lb).  Multiply the number of pounds by 703.  So, for an adult who weighs 150 lb, multiply that number by 703: 150 x 703, which equals 105,450.  Measure your height in inches. Then multiply that number by itself to get a measurement called \"inches squared.\"  So, for an adult who is 70 inches tall, the \"inches squared\" measurement is 70 inches x 70 inches, which equals 4,900 inches squared.  Divide the total from step 2 (number of lb x 703) by the total from step 3 (inches squared): 105,450 ÷ 4,900 = 21.5. This is your BMI.  To calculate your BMI in metric measurements:    Measure your weight in kilograms (kg).  For this example, the weight is 70 kg.  Measure your height in meters (m). Then multiply that number by itself to get a measurement called \"meters squared.\"  So, for an adult who is 1.75 m tall, the \"meters squared\" measurement is 1.75 m x 1.75 " m, which equals 3.1 meters squared.  Divide the number of kilograms (your weight) by the meters squared number. In this example: 70 ÷ 3.1 = 22.6. This is your BMI.  What do the results mean?  BMI charts are used to see if you are underweight, normal weight, overweight, or obese. The following guidelines will be used:  Underweight: BMI less than 18.5.  Normal weight: BMI between 18.5 and 24.9.  Overweight: BMI between 25 and 29.9.  Obese: BMI of 30 or above.  BMI is a tool and cannot diagnose a condition. Talk with your health care provider about what your BMI means for you. Keep these notes in mind:  Weight includes fat and muscle. Someone with a muscular build, such as an athlete, may have a BMI that is higher than 24.9. In cases like these, BMI is not a correct measure of body fat.  If you have a BMI of 25 or higher, your provider may need to do more testing to find out if excess body fat is the cause.  BMI is measured the same way for males and females. Females usually have more body fat than males of the same height and weight.  Where to find more information  For more information about BMI, including tools to quickly find your BMI, go to:  Centers for Disease Control and Prevention: cdc.gov  American Heart Association: heart.org  National Heart, Lung, and Blood [place]: nhlbi.nih.gov  This information is not intended to replace advice given to you by your health care provider. Make sure you discuss any questions you have with your health care provider.  Document Revised: 09/07/2023 Document Reviewed: 08/31/2023  [employer] Patient Education © 2024 [employer]DASH Eating Plan  DASH stands for Dietary Approaches to Stop Hypertension. The DASH eating plan is a healthy eating plan that has been shown to:  Lower high blood pressure (hypertension).  Reduce your risk for type 2 diabetes, heart disease, and stroke.  Help with weight loss.  What are tips for following this plan?  Reading food labels  Check food labels  "for the amount of salt (sodium) per serving. Choose foods with less than 5 percent of the Daily Value (DV) of sodium. In general, foods with less than 300 milligrams (mg) of sodium per serving fit into this eating plan.  To find whole grains, look for the word \"whole\" as the first word in the ingredient list.  Shopping  Buy products labeled as \"low-sodium\" or \"no salt added.\"  Buy fresh foods. Avoid canned foods and pre-made or frozen meals.  Cooking  Try not to add salt when you cook. Use salt-free seasonings or herbs instead of table salt or sea salt. Check with your health care provider or pharmacist before using salt substitutes.  Do not meza foods. Cook foods in healthy ways, such as baking, boiling, grilling, roasting, or broiling.  Cook using oils that are good for your heart. These include olive, canola, avocado, soybean, and sunflower oil.  Meal planning    Eat a balanced diet. This should include:  4 or more servings of fruits and 4 or more servings of vegetables each day. Try to fill half of your plate with fruits and vegetables.  6-8 servings of whole grains each day.  6 or less servings of lean meat, poultry, or fish each day. 1 oz is 1 serving. A 3 oz (85 g) serving of meat is about the same size as the palm of your hand. One egg is 1 oz (28 g).  2-3 servings of low-fat dairy each day. One serving is 1 cup (237 mL).  1 serving of nuts, seeds, or beans 5 times each week.  2-3 servings of heart-healthy fats. Healthy fats called omega-3 fatty acids are found in foods such as walnuts, flaxseeds, fortified milks, and eggs. These fats are also found in cold-water fish, such as sardines, salmon, and mackerel.  Limit how much you eat of:  Canned or prepackaged foods.  Food that is high in trans fat, such as fried foods.  Food that is high in saturated fat, such as fatty meat.  Desserts and other sweets, sugary drinks, and other foods with added sugar.  Full-fat dairy products.  Do not salt foods before " eating.  Do not eat more than 4 egg yolks a week.  Try to eat at least 2 vegetarian meals a week.  Eat more home-cooked food and less restaurant, buffet, and fast food.  Lifestyle  When eating at a restaurant, ask if your food can be made with less salt or no salt.  If you drink alcohol:  Limit how much you have to:  0-1 drink a day if you are female.  0-2 drinks a day if you are male.  Know how much alcohol is in your drink. In the [place] one drink is one 12 oz bottle of beer (355 mL), one 5 oz glass of wine (148 mL), or one 1½ oz glass of hard liquor (44 mL).  General information  Avoid eating more than 2,300 mg of salt a day. If you have hypertension, you may need to reduce your sodium intake to 1,500 mg a day.  Work with your provider to stay at a healthy body weight or lose weight. Ask what the best weight range is for you.  On most days of the week, get at least 30 minutes of exercise that causes your heart to beat faster. This may include walking, swimming, or biking.  Work with your provider or dietitian to adjust your eating plan to meet your specific calorie needs.  What foods should I eat?  Fruits  All fresh, dried, or frozen fruit. Canned fruits that are in their natural juice and do not have sugar added to them.  Vegetables  Fresh or frozen vegetables that are raw, steamed, roasted, or grilled. Low-sodium or reduced-sodium tomato and vegetable juice. Low-sodium or reduced-sodium tomato sauce and tomato paste. Low-sodium or reduced-sodium canned vegetables.  Grains  Whole-grain or whole-wheat bread. Whole-grain or whole-wheat pasta. Brown rice. Oatmeal. Quinoa. Bulgur. Whole-grain and low-sodium cereals. Kajal bread. Low-fat, low-sodium crackers. Whole-wheat flour tortillas.  Meats and other proteins  Skinless chicken or turkey. Ground chicken or turkey. Pork with fat trimmed off. Fish and seafood. Egg whites. Dried beans, peas, or lentils. Unsalted nuts, nut butters, and seeds. Unsalted canned beans. Lean  cuts of beef with fat trimmed off. Low-sodium, lean precooked or cured meat, such as sausages or meat loaves.  Dairy  Low-fat (1%) or fat-free (skim) milk. Reduced-fat, low-fat, or fat-free cheeses. Nonfat, low-sodium ricotta or cottage cheese. Low-fat or nonfat yogurt. Low-fat, low-sodium cheese.  Fats and oils  Soft margarine without trans fats. Vegetable oil. Reduced-fat, low-fat, or light mayonnaise and salad dressings (reduced-sodium). Canola, safflower, olive, avocado, soybean, and sunflower oils. Avocado.  Seasonings and condiments  Herbs. Spices. Seasoning mixes without salt.  Other foods  Unsalted popcorn and pretzels. Fat-free sweets.  The items listed above may not be all the foods and drinks you can have. Talk to a dietitian to learn more.  What foods should I avoid?  Fruits  Canned fruit in a light or heavy syrup. Fried fruit. Fruit in cream or butter sauce.  Vegetables  Creamed or fried vegetables. Vegetables in a cheese sauce. Regular canned vegetables that are not marked as low-sodium or reduced-sodium. Regular canned tomato sauce and paste that are not marked as low-sodium or reduced-sodium. Regular tomato and vegetable juices that are not marked as low-sodium or reduced-sodium. Pickles. Olives.  Grains  Baked goods made with fat, such as croissants, muffins, or some breads. Dry pasta or rice meal packs.  Meats and other proteins  Fatty cuts of meat. Ribs. Fried meat. Gonsales. Bologna, salami, and other precooked or cured meats, such as sausages or meat loaves, that are not lean and low in sodium. Fat from the back of a pig (fatback). Bratwurst. Salted nuts and seeds. Canned beans with added salt. Canned or smoked fish. Whole eggs or egg yolks. Chicken or turkey with skin.  Dairy  Whole or 2% milk, cream, and half-and-half. Whole or full-fat cream cheese. Whole-fat or sweetened yogurt. Full-fat cheese. Nondairy creamers. Whipped toppings. Processed cheese and cheese spreads.  Fats and oils  Butter.  Stick margarine. Lard. Shortening. Ghee. Gonsales fat. Tropical oils, such as coconut, palm kernel, or palm oil.  Seasonings and condiments  Onion salt, garlic salt, seasoned salt, table salt, and sea salt. Worcestershire sauce. Tartar sauce. Barbecue sauce. Teriyaki sauce. Soy sauce, including reduced-sodium soy sauce. Steak sauce. Canned and packaged gravies. Fish sauce. Oyster sauce. Cocktail sauce. Store-bought horseradish. Ketchup. Mustard. Meat flavorings and tenderizers. Bouillon cubes. Hot sauces. Pre-made or packaged marinades. Pre-made or packaged taco seasonings. Relishes. Regular salad dressings.  Other foods  Salted popcorn and pretzels.  The items listed above may not be all the foods and drinks you should avoid. Talk to a dietitian to learn more.  Where to find more information  National Heart, Lung, and Blood [place] (NHLBI): nhlbi.nih.gov  American Heart Association (AHA): heart.org  Academy of Nutrition and Dietetics: eatright.org  National Kidney Foundation (NKF): kidney.org  This information is not intended to replace advice given to you by your health care provider. Make sure you discuss any questions you have with your health care provider.  Document Revised: 01/04/2024 Document Reviewed: 01/04/2024  [employer] Patient Education © 2024 [employer]

## 2025-01-09 NOTE — PROGRESS NOTES
Patient: Karan Piedra  : 1945    Primary Care Provider: Ozzie Thibodeaux MD    Requesting Provider: Ozzie Thibodeaux MD        History    Chief Complaint: Back pain  Chief Complaint   Patient presents with    Abnormal Imaging     NEW PATIENT/DR THIBODEAUX:  back pain, no accident/injury that he is aware of.  Imaging [facility]       History of Present Illness: 79-year-old gentleman that presents with about an 18-month history of right paraspinal thoracolumbar pain.  He is very clear that if he sits down or lies down the pain will go away but if he is up and moving especially if he is twisting or reaching the pain well recur to the point where it can be severe at times.  This was a waxing and waning problem for about a year within the last 6 months he says it has been far more frequent and consistent.  He has done at least 2 dedicated courses of physical therapy for this issue without any improvement.  Dr. Vasquez did state some sort of localized injection over the area of concern without any improvement.  He has tried bracing.  He has not seen a chiropractor.  He has used a Lortab which she says is helpful to lessen the severity of the pain.  He does have a history of prostate cancer.    Review of Systems   Musculoskeletal:  Positive for back pain.   All other systems reviewed and are negative.      Past Medical History:   Past Medical History:   Diagnosis Date    Arthritis     Colon polyps     High cholesterol     Hypertension     Mycobacterium avium complex     Prostate CA     PSA elevation     Seasonal allergies        Past Surgical History:   Past Surgical History:   Procedure Laterality Date    COLONOSCOPY  08/10/2016    five 5 mm polyps in the transverse colon,at the hepatic flexure and in the cecum  resected and retrieved    COLONOSCOPY N/A 10/19/2021    Procedure: COLONOSCOPY WITH ANESTHESIA;  Surgeon: Scotty Jorgensen DO;  Location: [facility] ENDOSCOPY;  Service: Gastroenterology;   Laterality: N/A;  pre adenomatous polyp  post polyps  Ozzie Feliz MD    COLONOSCOPY N/A 10/20/2021    Procedure: COLONOSCOPY WITH ANESTHESIA;  Surgeon: Scotyt Jorgensen DO;  Location: [facility] ENDOSCOPY;  Service: Gastroenterology;  Laterality: N/A;  pre: hx polyps  post: diverticulosis  Ozzie Feliz MD    HERNIA REPAIR      abdominal    PROSTATE BIOPSY N/A 8/7/2018    Procedure: PROSTATE ULTRASOUND  URONAV FUSION BIOPSY;  Surgeon: Chemo Tierney MD;  Location:  [facility] OR;  Service: Urology    PROSTATE ULTRASOUND BIOPSY  12/2016    negative    PROSTATECTOMY N/A 11/13/2018    Procedure: PROSTATECTOMY LAPAROSCOPIC WITH DAVINCI SI ROBOT WITH PELVIC LYMPH NODE DISSECTION;  Surgeon: Chemo Tierney MD;  Location:  [facility] OR;  Service: DaVinci    ROTATOR CUFF REPAIR Bilateral     SKIN LESION EXCISION Right 2/15/2022    Procedure: 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    Social History:  reports that he has never smoked. He has never been exposed to tobacco smoke. He has never used smokeless tobacco. He reports current alcohol use. He reports that he does not use drugs.    Medications:  (Not in a hospital admission)      Allergies:  Patient has no known allergies.    Physical Exam:     Physical Exam  Constitutional:       General: He is awake.   Eyes:      Extraocular Movements: Extraocular movements intact.      Pupils: Pupils are equal, round, and reactive to light.   Cardiovascular:      Rate and Rhythm: Normal rate and regular rhythm.   Pulmonary:      Effort: Pulmonary effort is normal.   Abdominal:      General: There is no distension.      Palpations: Abdomen is soft.   Neurological:      Motor: Motor strength is normal.     Deep  Tendon Reflexes: Reflexes are normal and symmetric.   Psychiatric:         Speech: Speech normal.         Neurological Exam  Mental Status  Awake. Oriented to person, place and time. Speech is normal. Language is fluent with no aphasia. Attention and concentration are normal.    Cranial Nerves  CN I: Sense of smell is normal.  CN II: Right normal visual field. Left normal visual field.  CN III, IV, VI: Extraocular movements intact bilaterally. Pupils equal round and reactive to light bilaterally.  CN V: Facial sensation is normal.  CN VII:  Right: There is no facial weakness.  Left: There is no facial weakness.  CN XI: Shoulder shrug strength is normal.  CN XII: Tongue midline without atrophy or fasciculations.    Motor  Normal muscle bulk throughout. Normal muscle tone. Strength is 5/5 throughout all four extremities.    Sensory  Sensation is intact to light touch, pinprick, vibration and proprioception in all four extremities.    Reflexes  Deep tendon reflexes are 2+ and symmetric in all four extremities.    Gait  Normal casual, toe, heel and tandem gait.    Localized area of tenderness to the right of midline at the thoracolumbar junction in the vicinity of the last rib.    Independent Review of Radiographic Studies:       ASSESSMENT/PLAN: MRI of the thoracic spine images to L1.  There are some chronic wedging at T11 and T12.  There is no significant neuroforaminal compromise or compression.  CT scan of the abdomen pelvis does not show any obvious bony abnormality in the area in question.  There is a large left renal cyst which is a chronic finding.    The patient complains of right paraspinal pain and tenderness it is worse with any sort of activity.  I do not appreciate any sort of obvious structural issue that would explain this pain.  This could either be a myofascial pain syndrome or perhaps some sort of pathology involving the rib head.  I would recommend an MRI of the lumbar spine with and without contrast  given the patient's history of prostate cancer.  His last PSA was normal.  He did have a nuclear medicine bone scan in 2020 which was negative.  We also going to prescribe a compounding cream.  Will see him in follow-up after the imaging is completed  1. Acute midline low back pain without sciatica    2. Closed wedge compression fracture of T12 vertebra, initial encounter    3. Prostate cancer    4. Non-smoker    5. Class 1 obesity due to excess calories with serious comorbidity and body mass index (BMI) of 30.0 to 30.9 in adult          Return for APPT BASED ON TEST DATE/PT TO CALL WITH TEST DATE.      Mahendra Santana MD

## 2025-01-10 ENCOUNTER — PATIENT ROUNDING (BHMG ONLY) (OUTPATIENT)
Dept: NEUROSURGERY | Facility: CLINIC | Age: 80
End: 2025-01-10
Payer: MEDICARE

## 2025-01-10 NOTE — PROGRESS NOTES
A My-Chart message has been sent to the patient for PATIENT ROUNDING with [facility].

## 2025-01-16 ENCOUNTER — TELEPHONE (OUTPATIENT)
Dept: NEUROSURGERY | Facility: CLINIC | Age: 80
End: 2025-01-16
Payer: MEDICARE

## 2025-01-16 NOTE — TELEPHONE ENCOUNTER
Patient called and left a VM stating he has not been scheduled for his MRI yet.  I reviewed his referral & it looks like someone attempted to contact him today with an appt but had to leave a VM.  I called the patient to let him know but I also had to leave a VM.  I did give him the # to call scheduling.    BRITTANI ESCOTO [facility]  CLINICAL COORDINATOR  DR ERA BASS  [facility]    IT IS OKAY FOR THE HUB TO DELIVER THIS INFORMATION TO THE PATIENT IF THEY RECEIVE THIS CALL BACK

## 2025-01-17 NOTE — TELEPHONE ENCOUNTER
Called patient to let him know I would work on getting him an appt to come back after his MRI - had to leave a VM letting him know I would call him when I have an opening for an appointment.      BRITTANI ESCOTO CMA  CLINICAL COORDINATOR  DR ERA BASS  [facility] [place] [facility]      ,IT IS OKAY FOR THE HUB TO DELIVER THIS INFORMATION TO THE PATIENT IF THEY RECEIVE THIS CALL BACK

## 2025-02-03 ENCOUNTER — HOSPITAL ENCOUNTER (OUTPATIENT)
Dept: MRI IMAGING | Facility: HOSPITAL | Age: 80
Discharge: HOME | End: 2025-02-03
Admitting: NEUROLOGICAL SURGERY
Payer: MEDICARE

## 2025-02-03 DIAGNOSIS — C61 PROSTATE CANCER: ICD-10-CM

## 2025-02-03 DIAGNOSIS — M54.50 ACUTE MIDLINE LOW BACK PAIN WITHOUT SCIATICA: ICD-10-CM

## 2025-02-03 PROCEDURE — 72158 MRI LUMBAR SPINE W/O & W/DYE: CPT

## 2025-02-03 PROCEDURE — 25510000001 GADOPICLENOL 0.5 MMOL/ML SOLUTION: Performed by: NEUROLOGICAL SURGERY

## 2025-02-03 PROCEDURE — A9579 GAD-BASE MR CONTRAST NOS,1ML: HCPCS | Performed by: NEUROLOGICAL SURGERY

## 2025-02-03 RX ADMIN — GADOPICLENOL 9 ML: 485.1 INJECTION INTRAVENOUS at 16:20

## 2025-02-04 ENCOUNTER — OFFICE VISIT (OUTPATIENT)
Dept: NEUROSURGERY | Facility: CLINIC | Age: 80
End: 2025-02-04
Payer: MEDICARE

## 2025-02-04 VITALS — BODY MASS INDEX: 30.31 KG/M2 | WEIGHT: 200 LBS | HEIGHT: 68 IN

## 2025-02-04 DIAGNOSIS — E66.09 CLASS 1 OBESITY DUE TO EXCESS CALORIES WITH SERIOUS COMORBIDITY AND BODY MASS INDEX (BMI) OF 30.0 TO 30.9 IN ADULT: ICD-10-CM

## 2025-02-04 DIAGNOSIS — Z78.9 NON-SMOKER: ICD-10-CM

## 2025-02-04 DIAGNOSIS — M54.50 ACUTE MIDLINE LOW BACK PAIN WITHOUT SCIATICA: ICD-10-CM

## 2025-02-04 DIAGNOSIS — M51.370 DEGENERATION OF INTERVERTEBRAL DISC OF LUMBOSACRAL REGION WITH DISCOGENIC BACK PAIN: Primary | ICD-10-CM

## 2025-02-04 DIAGNOSIS — S22.080G COMPRESSION FRACTURE OF T12 VERTEBRA, WITH DELAYED HEALING, SUBSEQUENT ENCOUNTER: ICD-10-CM

## 2025-02-04 DIAGNOSIS — E66.811 CLASS 1 OBESITY DUE TO EXCESS CALORIES WITH SERIOUS COMORBIDITY AND BODY MASS INDEX (BMI) OF 30.0 TO 30.9 IN ADULT: ICD-10-CM

## 2025-02-04 RX ORDER — HYDROCHLOROTHIAZIDE 12.5 MG/1
TABLET ORAL
COMMUNITY
Start: 2025-01-15

## 2025-02-04 NOTE — PATIENT INSTRUCTIONS
"PATIENT TO CONTINUE TO FOLLOW UP WITH HIS PRIMARY CARE PROVIDER FOR YEARLY PHYSICAL EXAMS TO ENSURE COMPLETE HEALTH MAINTENANCE    BMI for Adults  Body mass index (BMI) is a number found using a person's weight and height. BMI can help tell how much of a person's weight is made up of fat. BMI does not measure body fat directly. It is used instead of tests that directly measure body fat, which can be difficult and expensive.  What are BMI measurements used for?  BMI is useful to:  Find out if your weight puts you at higher risk for medical problems.  Help recommend changes, such as in diet and exercise. This can help you reach a healthy weight. BMI screening can be done again to see if these changes are working.  How is BMI calculated?  Your height and weight are measured. The BMI is found from those numbers. This can be done with U.S[place] or metric measurements. Note that charts and online BMI calculators are available to help you find your BMI quickly and easily without doing these calculations.  To calculate your BMI in U.S. measurements:  Measure your weight in pounds (lb).  Multiply the number of pounds by 703.  So, for an adult who weighs 150 lb, multiply that number by 703: 150 x 703, which equals 105,450.  Measure your height in inches. Then multiply that number by itself to get a measurement called \"inches squared.\"  So, for an adult who is 70 inches tall, the \"inches squared\" measurement is 70 inches x 70 inches, which equals 4,900 inches squared.  Divide the total from step 2 (number of lb x 703) by the total from step 3 (inches squared): 105,450 ÷ 4,900 = 21.5. This is your BMI.  To calculate your BMI in metric measurements:    Measure your weight in kilograms (kg).  For this example, the weight is 70 kg.  Measure your height in meters (m). Then multiply that number by itself to get a measurement called \"meters squared.\"  So, for an adult who is 1.75 m tall, the \"meters squared\" measurement is 1.75 m x 1.75 " m, which equals 3.1 meters squared.  Divide the number of kilograms (your weight) by the meters squared number. In this example: 70 ÷ 3.1 = 22.6. This is your BMI.  What do the results mean?  BMI charts are used to see if you are underweight, normal weight, overweight, or obese. The following guidelines will be used:  Underweight: BMI less than 18.5.  Normal weight: BMI between 18.5 and 24.9.  Overweight: BMI between 25 and 29.9.  Obese: BMI of 30 or above.  BMI is a tool and cannot diagnose a condition. Talk with your health care provider about what your BMI means for you. Keep these notes in mind:  Weight includes fat and muscle. Someone with a muscular build, such as an athlete, may have a BMI that is higher than 24.9. In cases like these, BMI is not a correct measure of body fat.  If you have a BMI of 25 or higher, your provider may need to do more testing to find out if excess body fat is the cause.  BMI is measured the same way for males and females. Females usually have more body fat than males of the same height and weight.  Where to find more information  For more information about BMI, including tools to quickly find your BMI, go to:  Centers for Disease Control and Prevention: cdc.gov  American Heart Association: heart.org  National Heart, Lung, and Blood [place]: nhlbi.nih.gov  This information is not intended to replace advice given to you by your health care provider. Make sure you discuss any questions you have with your health care provider.  Document Revised: 09/07/2023 Document Reviewed: 08/31/2023  [employer] Patient Education © 2024 [employer]DASH Eating Plan  DASH stands for Dietary Approaches to Stop Hypertension. The DASH eating plan is a healthy eating plan that has been shown to:  Lower high blood pressure (hypertension).  Reduce your risk for type 2 diabetes, heart disease, and stroke.  Help with weight loss.  What are tips for following this plan?  Reading food labels  Check food labels  "for the amount of salt (sodium) per serving. Choose foods with less than 5 percent of the Daily Value (DV) of sodium. In general, foods with less than 300 milligrams (mg) of sodium per serving fit into this eating plan.  To find whole grains, look for the word \"whole\" as the first word in the ingredient list.  Shopping  Buy products labeled as \"low-sodium\" or \"no salt added.\"  Buy fresh foods. Avoid canned foods and pre-made or frozen meals.  Cooking  Try not to add salt when you cook. Use salt-free seasonings or herbs instead of table salt or sea salt. Check with your health care provider or pharmacist before using salt substitutes.  Do not meza foods. Cook foods in healthy ways, such as baking, boiling, grilling, roasting, or broiling.  Cook using oils that are good for your heart. These include olive, canola, avocado, soybean, and sunflower oil.  Meal planning    Eat a balanced diet. This should include:  4 or more servings of fruits and 4 or more servings of vegetables each day. Try to fill half of your plate with fruits and vegetables.  6-8 servings of whole grains each day.  6 or less servings of lean meat, poultry, or fish each day. 1 oz is 1 serving. A 3 oz (85 g) serving of meat is about the same size as the palm of your hand. One egg is 1 oz (28 g).  2-3 servings of low-fat dairy each day. One serving is 1 cup (237 mL).  1 serving of nuts, seeds, or beans 5 times each week.  2-3 servings of heart-healthy fats. Healthy fats called omega-3 fatty acids are found in foods such as walnuts, flaxseeds, fortified milks, and eggs. These fats are also found in cold-water fish, such as sardines, salmon, and mackerel.  Limit how much you eat of:  Canned or prepackaged foods.  Food that is high in trans fat, such as fried foods.  Food that is high in saturated fat, such as fatty meat.  Desserts and other sweets, sugary drinks, and other foods with added sugar.  Full-fat dairy products.  Do not salt foods before " eating.  Do not eat more than 4 egg yolks a week.  Try to eat at least 2 vegetarian meals a week.  Eat more home-cooked food and less restaurant, buffet, and fast food.  Lifestyle  When eating at a restaurant, ask if your food can be made with less salt or no salt.  If you drink alcohol:  Limit how much you have to:  0-1 drink a day if you are female.  0-2 drinks a day if you are male.  Know how much alcohol is in your drink. In the U[place] one drink is one 12 oz bottle of beer (355 mL), one 5 oz glass of wine (148 mL), or one 1½ oz glass of hard liquor (44 mL).  General information  Avoid eating more than 2,300 mg of salt a day. If you have hypertension, you may need to reduce your sodium intake to 1,500 mg a day.  Work with your provider to stay at a healthy body weight or lose weight. Ask what the best weight range is for you.  On most days of the week, get at least 30 minutes of exercise that causes your heart to beat faster. This may include walking, swimming, or biking.  Work with your provider or dietitian to adjust your eating plan to meet your specific calorie needs.  What foods should I eat?  Fruits  All fresh, dried, or frozen fruit. Canned fruits that are in their natural juice and do not have sugar added to them.  Vegetables  Fresh or frozen vegetables that are raw, steamed, roasted, or grilled. Low-sodium or reduced-sodium tomato and vegetable juice. Low-sodium or reduced-sodium tomato sauce and tomato paste. Low-sodium or reduced-sodium canned vegetables.  Grains  Whole-grain or whole-wheat bread. Whole-grain or whole-wheat pasta. Brown rice. Oatmeal. Quinoa. Bulgur. Whole-grain and low-sodium cereals. Kajal bread. Low-fat, low-sodium crackers. Whole-wheat flour tortillas.  Meats and other proteins  Skinless chicken or turkey. Ground chicken or turkey. Pork with fat trimmed off. Fish and seafood. Egg whites. Dried beans, peas, or lentils. Unsalted nuts, nut butters, and seeds. Unsalted canned beans. Lean  cuts of beef with fat trimmed off. Low-sodium, lean precooked or cured meat, such as sausages or meat loaves.  Dairy  Low-fat (1%) or fat-free (skim) milk. Reduced-fat, low-fat, or fat-free cheeses. Nonfat, low-sodium ricotta or cottage cheese. Low-fat or nonfat yogurt. Low-fat, low-sodium cheese.  Fats and oils  Soft margarine without trans fats. Vegetable oil. Reduced-fat, low-fat, or light mayonnaise and salad dressings (reduced-sodium). Canola, safflower, olive, avocado, soybean, and sunflower oils. Avocado.  Seasonings and condiments  Herbs. Spices. Seasoning mixes without salt.  Other foods  Unsalted popcorn and pretzels. Fat-free sweets.  The items listed above may not be all the foods and drinks you can have. Talk to a dietitian to learn more.  What foods should I avoid?  Fruits  Canned fruit in a light or heavy syrup. Fried fruit. Fruit in cream or butter sauce.  Vegetables  Creamed or fried vegetables. Vegetables in a cheese sauce. Regular canned vegetables that are not marked as low-sodium or reduced-sodium. Regular canned tomato sauce and paste that are not marked as low-sodium or reduced-sodium. Regular tomato and vegetable juices that are not marked as low-sodium or reduced-sodium. Pickles. Olives.  Grains  Baked goods made with fat, such as croissants, muffins, or some breads. Dry pasta or rice meal packs.  Meats and other proteins  Fatty cuts of meat. Ribs. Fried meat. Gonsales. Bologna, salami, and other precooked or cured meats, such as sausages or meat loaves, that are not lean and low in sodium. Fat from the back of a pig (fatback). Bratwurst. Salted nuts and seeds. Canned beans with added salt. Canned or smoked fish. Whole eggs or egg yolks. Chicken or turkey with skin.  Dairy  Whole or 2% milk, cream, and half-and-half. Whole or full-fat cream cheese. Whole-fat or sweetened yogurt. Full-fat cheese. Nondairy creamers. Whipped toppings. Processed cheese and cheese spreads.  Fats and oils  Butter.  Stick margarine. Lard. Shortening. Ghee. Gonsales fat. Tropical oils, such as coconut, palm kernel, or palm oil.  Seasonings and condiments  Onion salt, garlic salt, seasoned salt, table salt, and sea salt. Worcestershire sauce. Tartar sauce. Barbecue sauce. Teriyaki sauce. Soy sauce, including reduced-sodium soy sauce. Steak sauce. Canned and packaged gravies. Fish sauce. Oyster sauce. Cocktail sauce. Store-bought horseradish. Ketchup. Mustard. Meat flavorings and tenderizers. Bouillon cubes. Hot sauces. Pre-made or packaged marinades. Pre-made or packaged taco seasonings. Relishes. Regular salad dressings.  Other foods  Salted popcorn and pretzels.  The items listed above may not be all the foods and drinks you should avoid. Talk to a dietitian to learn more.  Where to find more information  National Heart, Lung, and Blood [place] (NHLBI): nhlbi.nih.gov  American Heart Association (AHA): heart.org  Academy of Nutrition and Dietetics: eatright.org  National Kidney Foundation (NKF): kidney.org  This information is not intended to replace advice given to you by your health care provider. Make sure you discuss any questions you have with your health care provider.  Document Revised: 01/04/2024 Document Reviewed: 01/04/2024  [employer] Patient Education © 2024 [employer]

## 2025-02-04 NOTE — PROGRESS NOTES
SUBJECTIVE:  Patient ID: Karan Piedra is a 79 y.o. male is here today for follow-up.    Chief Complaint: Back pain  Chief Complaint   Patient presents with    Results     Patient is here today to discuss MRI ([facility] 2/3/2024).         HPI  79-year-old gentleman we have been following for right paraspinal pain.  We sent him for additional imaging.  He says that the pain is not getting worse.  It seems to be associated with certain mechanical triggers.  He also gets relief from Norco 5 mg for a few hours.  He did have trigger point injections in this area but did not get any relief.    The following portions of the patient's history were reviewed and updated as appropriate: allergies, current medications, past family history, past medical history, past social history, past surgical history and problem list.    OBJECTIVE:    Review of Systems   Musculoskeletal:  Positive for back pain.          Physical Exam  Constitutional:       General: He is awake.   Eyes:      Extraocular Movements: Extraocular movements intact.      Pupils: Pupils are equal, round, and reactive to light.   Neurological:      Motor: Motor strength is normal.     Deep Tendon Reflexes: Reflexes are normal and symmetric.   Psychiatric:         Speech: Speech normal.         Neurological Exam  Mental Status  Awake. Oriented to person, place and time. Speech is normal. Language is fluent with no aphasia. Attention and concentration are normal.    Cranial Nerves  CN I: Sense of smell is normal.  CN II: Right normal visual field. Left normal visual field.  CN III, IV, VI: Extraocular movements intact bilaterally. Pupils equal round and reactive to light bilaterally.  CN V: Facial sensation is normal.  CN VII:  Right: There is no facial weakness.  Left: There is no facial weakness.  CN XI: Shoulder shrug strength is normal.  CN XII: Tongue midline without atrophy or fasciculations.    Motor  Normal muscle bulk throughout. Normal muscle tone. Strength  is 5/5 throughout all four extremities.    Sensory  Sensation is intact to light touch, pinprick, vibration and proprioception in all four extremities.    Reflexes  Deep tendon reflexes are 2+ and symmetric in all four extremities.    Gait  Normal casual, toe, heel and tandem gait.      Independent Review of Radiographic Studies:       ASSESSMENT/PLAN:  The patient continues to complain of right paraspinal back pain at the thoracolumbar junction.  Upon review of all of the imaging there may be some foraminal narrowing at T11-12 .  I spoke to Dr. Cook who would be willing to explore the possibility of a selective nerve root injection at T11-12 on the right for both therapeutic and diagnostic purposes.  I also communicated with the patient's primary care doctor who is going to continue to treat this with oral narcotics as needed.  Will see him in follow-up after the injections been completed.      1. 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 Acute midline low back pain without sciatica    3. Compression fracture of T12 vertebra, with delayed healing, subsequent encounter    4. Non-smoker    5. Class 1 obesity due to excess calories with serious comorbidity and body mass index (BMI) of 30.0 to 30.9 in adult        The patient's Body mass index is 30.41 kg/m².. BMI is above normal parameters. Recommendations include: educational material    Return for PRN - REFERRING TO PAIN MGMT.      Mahendra Santana MD

## 2025-02-24 ENCOUNTER — TELEPHONE (OUTPATIENT)
Dept: NEUROSURGERY | Facility: CLINIC | Age: 80
End: 2025-02-24
Payer: MEDICARE

## 2025-02-24 NOTE — TELEPHONE ENCOUNTER
Had an injection a couple weeks ago but did not get any significant relief.  He had a follow up with them last Friday 2/21, they increased his Gabapentin and changed his muscle relaxer.  He is scheduled for another injection on 3/10/25.  I have scheduled him a follow up with Dr Bass for 4/17/25 to give him a few weeks in between injections and seeing us.  He will call if he needs to be seen sooner.    BRITTANI ESCOTO [facility]  CLINICAL COORDINATOR  DR ERA BASS  [facility] [place] [facility]

## 2025-02-24 NOTE — TELEPHONE ENCOUNTER
Patient was referred to [facility]mt.  He has been seen a few times and so I called him today to check in on him and see how it was going.  He didn't answer so I left a VM asking him to call me back.  Dr Bass had wanted to see him at some point after he was evaluated by [facility] & had some injections.    BRITTANI ESCOTO [facility]  CLINICAL COORDINATOR  DR ERA BASS  [facility] [place] [facility]    IT IS OKAY FOR THE HUB TO DELIVER THIS INFORMATION TO THE PATIENT IF THEY RECEIVE THIS CALL BACK

## 2025-03-24 ENCOUNTER — TELEPHONE (OUTPATIENT)
Dept: UROLOGY | Facility: CLINIC | Age: 80
End: 2025-03-24
Payer: MEDICARE

## 2025-03-24 NOTE — TELEPHONE ENCOUNTER
Pt called to ask about a medication to help with urinary urgency at night. He said that last time he talked to Dr. Tierney he was told there may be a generic coming out this year.    I believe he is talking about mirabegron based on the most recent note. The patient stated he is not currently taking mirabegron.  --  Called patient back to let him know that he would need to schedule an appointment for this. Patient was agreeable. Transferred patient to the  to get scheduled.

## 2025-04-04 NOTE — PROGRESS NOTES
Chief Complaint  Prostate cancer    Subjective          Karan Piedra presents to [facility] UROLOGY   Continued issues with nocturia and some mild daytime frequency.  He is just over 6 years status post robot-assisted laparoscopic prostatectomy.  His continence is good.Patient denies any possible systemic symptoms of prostate cancer such as weight loss, lower extremity edema, or skeletal pain that could be worrisome for systemic disease.                  Current Outpatient Medications:     aspirin 81 MG EC tablet, Take 1 tablet by mouth Daily. On hold for surgery, Disp: , Rfl:     cyclobenzaprine (FLEXERIL) 10 MG tablet, Take 1 tablet by mouth Every 12 (Twelve) Hours., Disp: , Rfl:     hydroCHLOROthiazide 12.5 MG tablet, , Disp: , Rfl:     HYDROcodone-acetaminophen (NORCO) 5-325 MG per tablet, , Disp: , Rfl:     irbesartan (AVAPRO) 300 MG tablet, Take 1 tablet by mouth Daily., Disp: , Rfl:     meloxicam (MOBIC) 7.5 MG tablet, Take 1 tablet by mouth Daily., Disp: , Rfl:     Mirabegron ER (MYRBETRIQ) 25 MG tablet sustained-release 24 hour 24 hr tablet, Take 1 tablet by mouth Daily., Disp: 30 tablet, Rfl: 11    naloxone (NARCAN) 4 MG/0.1ML nasal spray, ADMINISTER A SINGLE SPRAY IN ONE NOSTRIL UPON SIGNS OF OPIOID OVERDOSE. CALL 911. REPEAT AFTER 3 MINUTES IF NO RESPONSE., Disp: , Rfl:     NON FORMULARY - PHARMACY TO ENTER MEDICATION, APPLY 1-2 PUMPS TO AFFECTED AREA 3-4 TIMES DAILY AS NEEDED., Disp: , Rfl:     oxyCODONE-acetaminophen (PERCOCET) 7.5-325 MG per tablet, Take 1 tablet by mouth Every 12 (Twelve) Hours., Disp: , Rfl:     pantoprazole (PROTONIX) 40 MG EC tablet, TAKE 1 TABLET BY MOUTH ONCE DAILY BEFORE MEAL(S), Disp: , Rfl:     pregabalin (LYRICA) 75 MG capsule, TAKE 1 CAPSULE BY MOUTH EVERY 12 HOURS AS NEEDED, Disp: , Rfl:     rosuvastatin (CRESTOR) 10 MG tablet, , Disp: , Rfl:     tiZANidine (ZANAFLEX) 4 MG tablet, Take 1 tablet by mouth Every 6 (Six) Hours As Needed., Disp: , Rfl:  "  Past Medical History:   Diagnosis Date    Arthritis     Colon polyps     High cholesterol     HL (hearing loss) proably    Hypertension     Low back pain     Mycobacterium avium complex     Prostate CA     PSA elevation     Seasonal allergies      Past Surgical History:   Procedure Laterality Date    COLONOSCOPY  08/10/2016    five 5 mm polyps in the transverse colon,at the hepatic flexure and in the cecum  resected and retrieved    COLONOSCOPY N/A 10/19/2021    Procedure: COLONOSCOPY WITH ANESTHESIA;  Surgeon: Scotty Jorgensen DO;  Location:  [facility];  Service: Gastroenterology;  Laterality: N/A;  pre adenomatous polyp  post polyps  Ozzie Feliz MD    COLONOSCOPY N/A 10/20/2021    Procedure: COLONOSCOPY WITH ANESTHESIA;  Surgeon: Scotty Jorgensen DO;  Location:  [facility];  Service: Gastroenterology;  Laterality: N/A;  pre: hx polyps  post: diverticulosis  Ozzie Feliz MD    HERNIA REPAIR      abdominal    PROSTATE BIOPSY N/A 8/7/2018    Procedure: PROSTATE ULTRASOUND  URONAV FUSION BIOPSY;  Surgeon: Chemo Tierney MD;  Location:  [facility] OR;  Service: Urology    PROSTATE ULTRASOUND BIOPSY  12/2016    negative    PROSTATECTOMY N/A 11/13/2018    Procedure: PROSTATECTOMY LAPAROSCOPIC WITH DAVINCI SI ROBOT WITH PELVIC LYMPH NODE DISSECTION;  Surgeon: Chemo Tierney MD;  Location:  [facility] OR;  Service: DaVinci    ROTATOR CUFF REPAIR Bilateral     SKIN LESION EXCISION Right 2/15/2022    Procedure: 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 with superior M-plasty closure.;  Surgeon: Ye Argueta MD;  Location:  [facility] OR;  Service: ENT;  Laterality: Right;    TONSILLECTOMY             Review of Systems      Objective   PHYSICAL EXAM  Vital Signs:   Temp 96.6 °F (35.9 °C)   Ht 172.7 cm (68\")   Wt 94.4 kg (208 lb 3.2 oz)   BMI 31.66 kg/m²     Physical Exam      DATA  Result Review :              Results for orders placed or performed in visit on " 05/01/25   POC Urinalysis Dipstick    Collection Time: 05/01/25  1:41 PM    Specimen: Urine   Result Value Ref Range    Color Yellow Yellow, Straw, Dark Yellow, Soni    Clarity, UA Clear Clear    Glucose, UA Negative Negative mg/dL    Bilirubin Negative Negative    Ketones, UA Negative Negative    Specific Gravity  1.010 1.005 - 1.030    Blood, UA Negative Negative    pH, Urine 5.5 5.0 - 8.0    Protein, POC Negative Negative mg/dL    Urobilinogen, UA 0.2 E.U./dL Normal, 0.2 E.U./dL    Leukocytes Negative Negative    Nitrite, UA Negative Negative               Lab Results   Component Value Date    PSA <0.014 04/28/2025    PSA <0.014 11/07/2024    PSA <0.014 11/09/2023              ASSESSMENT AND PLAN          Problem List Items Addressed This Visit          Hematology and Neoplasia    Prostate cancer     Other Visit Diagnoses         Nocturia    -  Primary    Relevant Orders    PSA DIAGNOSTIC (Completed)    POC Urinalysis Dipstick (Completed)      Urinary urgency              Will start a trial of Gemtesa  Mirabegron is cost prohibitive.      FOLLOW UP     Return in about 1 year (around 5/1/2026) for PSA before visit.    I've had the privilege to manage this patient over 5 years for his prostate cancer and subsequent urinary tract symptoms. This will require ongoing management to include follow-up, possible medication adjustments and other therapies.      (Please note that portions of this note were completed with a voice recognition program.)  Chemo Tierney MD  05/01/25  15:24 CDT

## 2025-04-17 ENCOUNTER — OFFICE VISIT (OUTPATIENT)
Dept: NEUROSURGERY | Facility: CLINIC | Age: 80
End: 2025-04-17
Payer: MEDICARE

## 2025-04-17 VITALS — WEIGHT: 202 LBS | HEIGHT: 68 IN | BODY MASS INDEX: 30.62 KG/M2

## 2025-04-17 DIAGNOSIS — S22.080G COMPRESSION FRACTURE OF T12 VERTEBRA, WITH DELAYED HEALING, SUBSEQUENT ENCOUNTER: ICD-10-CM

## 2025-04-17 DIAGNOSIS — E66.09 CLASS 1 OBESITY DUE TO EXCESS CALORIES WITH SERIOUS COMORBIDITY AND BODY MASS INDEX (BMI) OF 30.0 TO 30.9 IN ADULT: ICD-10-CM

## 2025-04-17 DIAGNOSIS — Z78.9 NON-SMOKER: ICD-10-CM

## 2025-04-17 DIAGNOSIS — M54.50 ACUTE MIDLINE LOW BACK PAIN WITHOUT SCIATICA: ICD-10-CM

## 2025-04-17 DIAGNOSIS — E66.811 CLASS 1 OBESITY DUE TO EXCESS CALORIES WITH SERIOUS COMORBIDITY AND BODY MASS INDEX (BMI) OF 30.0 TO 30.9 IN ADULT: ICD-10-CM

## 2025-04-17 DIAGNOSIS — M51.370 DEGENERATION OF INTERVERTEBRAL DISC OF LUMBOSACRAL REGION WITH DISCOGENIC BACK PAIN: Primary | ICD-10-CM

## 2025-04-17 RX ORDER — CYCLOBENZAPRINE HCL 10 MG
1 TABLET ORAL EVERY 12 HOURS SCHEDULED
COMMUNITY
Start: 2025-04-04

## 2025-04-17 RX ORDER — GABAPENTIN 100 MG/1
1 CAPSULE ORAL EVERY 12 HOURS SCHEDULED
COMMUNITY
Start: 2025-02-06

## 2025-04-17 NOTE — PROGRESS NOTES
SUBJECTIVE:  Patient ID: Karan Piedra is a 79 y.o. male is here today for follow-up.    Chief Complaint: Back pain  Chief Complaint   Patient presents with    PAIN MGMT FOLLOW UP     Patient states he has had several injections since his last visit and none of them have given him any relief of his pain complaint.    He is scheduled for another injection on 4/22/25.  He has been started on Norco by [facility].       HPI  79-year-old gentleman we have been following for right paraspinal pain. We sent him for additional imaging. He says that the pain is not getting worse. It seems to be associated with certain mechanical triggers. He also gets relief from Norco 5 mg for a few hours. He did have trigger point injections in this area but did not get any relief.   He has had 2 sessions with Dr. Cook where they did selective nerve root injections involving T11-L2 on the right.  He may have gotten some short-term minimal relief from these injections.  He is also currently under the process of going through evaluation for possible radiofrequency ablation.  The following portions of the patient's history were reviewed and updated as appropriate: allergies, current medications, past family history, past medical history, past social history, past surgical history and problem list.    OBJECTIVE:    Review of Systems   Musculoskeletal:  Positive for back pain.   All other systems reviewed and are negative.         Physical Exam  Constitutional:       General: He is awake.   Eyes:      Extraocular Movements: Extraocular movements intact.      Pupils: Pupils are equal, round, and reactive to light.   Neurological:      Motor: Motor strength is normal.     Deep Tendon Reflexes: Reflexes are normal and symmetric.   Psychiatric:         Speech: Speech normal.         Neurological Exam  Mental Status  Awake. Oriented to person, place and time. Speech is normal. Language is fluent with no aphasia. Attention and concentration are  normal.    Cranial Nerves  CN I: Sense of smell is normal.  CN II: Right normal visual field. Left normal visual field.  CN III, IV, VI: Extraocular movements intact bilaterally. Pupils equal round and reactive to light bilaterally.  CN V: Facial sensation is normal.  CN VII:  Right: There is no facial weakness.  Left: There is no facial weakness.  CN XI: Shoulder shrug strength is normal.  CN XII: Tongue midline without atrophy or fasciculations.    Motor  Normal muscle bulk throughout. Normal muscle tone. Strength is 5/5 throughout all four extremities.    Sensory  Sensation is intact to light touch, pinprick, vibration and proprioception in all four extremities.    Reflexes  Deep tendon reflexes are 2+ and symmetric in all four extremities.    Gait  Normal casual, toe, heel and tandem gait.      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  He wants to finish the program he is currently involved in with Dr. Cook to see if his treatments will bring him a measure of relief.  We did discuss the possibility of a limited right hemilaminectomy foraminotomy medial facetectomy to try to treat this issue with surgery should no other conservative treatments to be effective.  Will see him in follow-up in about a month.      1. 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 Acute midline low back pain without sciatica    3. Compression fracture of T12 vertebra, with delayed healing, subsequent encounter    4. Non-smoker    5. Class 1 obesity due to excess calories with serious comorbidity and body mass index (BMI) of 30.0 to 30.9 in adult        The patient's Body mass index is 30.71 kg/m².. BMI is above normal parameters. Recommendations include: educational material    Return in about 6 weeks (around 5/29/2025) for SYMPTOM RECHECK (30 MINS)  - ALFONSO GASTON ALREADY IN THE SYSTEM.      Mahendra Santana MD

## 2025-04-17 NOTE — PATIENT INSTRUCTIONS
"PATIENT TO CONTINUE TO FOLLOW UP WITH HIS PRIMARY CARE PROVIDER FOR YEARLY PHYSICAL EXAMS TO ENSURE COMPLETE HEALTH MAINTENANCE      BMI for Adults  Body mass index (BMI) is a number found using a person's weight and height. BMI can help tell how much of a person's weight is made up of fat. BMI does not measure body fat directly. It is used instead of tests that directly measure body fat, which can be difficult and expensive.  What are BMI measurements used for?  BMI is useful to:  Find out if your weight puts you at higher risk for medical problems.  Help recommend changes, such as in diet and exercise. This can help you reach a healthy weight. BMI screening can be done again to see if these changes are working.  How is BMI calculated?  Your height and weight are measured. The BMI is found from those numbers. This can be done with [place] or metric measurements. Note that charts and online BMI calculators are available to help you find your BMI quickly and easily without doing these calculations.  To calculate your BMI in [place] measurements:  Measure your weight in pounds (lb).  Multiply the number of pounds by 703.  So, for an adult who weighs 150 lb, multiply that number by 703: 150 x 703, which equals 105,450.  Measure your height in inches. Then multiply that number by itself to get a measurement called \"inches squared.\"  So, for an adult who is 70 inches tall, the \"inches squared\" measurement is 70 inches x 70 inches, which equals 4,900 inches squared.  Divide the total from step 2 (number of lb x 703) by the total from step 3 (inches squared): 105,450 ÷ 4,900 = 21.5. This is your BMI.  To calculate your BMI in metric measurements:    Measure your weight in kilograms (kg).  For this example, the weight is 70 kg.  Measure your height in meters (m). Then multiply that number by itself to get a measurement called \"meters squared.\"  So, for an adult who is 1.75 m tall, the \"meters squared\" measurement is 1.75 m x 1.75 " m, which equals 3.1 meters squared.  Divide the number of kilograms (your weight) by the meters squared number. In this example: 70 ÷ 3.1 = 22.6. This is your BMI.  What do the results mean?  BMI charts are used to see if you are underweight, normal weight, overweight, or obese. The following guidelines will be used:  Underweight: BMI less than 18.5.  Normal weight: BMI between 18.5 and 24.9.  Overweight: BMI between 25 and 29.9.  Obese: BMI of 30 or above.  BMI is a tool and cannot diagnose a condition. Talk with your health care provider about what your BMI means for you. Keep these notes in mind:  Weight includes fat and muscle. Someone with a muscular build, such as an athlete, may have a BMI that is higher than 24.9. In cases like these, BMI is not a correct measure of body fat.  If you have a BMI of 25 or higher, your provider may need to do more testing to find out if excess body fat is the cause.  BMI is measured the same way for males and females. Females usually have more body fat than males of the same height and weight.  Where to find more information  For more information about BMI, including tools to quickly find your BMI, go to:  Centers for Disease Control and Prevention: cdc.gov  American Heart Association: heart.org  National Heart, Lung, and Blood [place]: nhlbi.nih.gov  This information is not intended to replace advice given to you by your health care provider. Make sure you discuss any questions you have with your health care provider.  Document Revised: 09/07/2023 Document Reviewed: 08/31/2023  [employer]" Patient Education © 2024 [employer]DASH Eating Plan  DASH stands for Dietary Approaches to Stop Hypertension. The DASH eating plan is a healthy eating plan that has been shown to:  Lower high blood pressure (hypertension).  Reduce your risk for type 2 diabetes, heart disease, and stroke.  Help with weight loss.  What are tips for following this plan?  Reading food labels  Check food labels  "for the amount of salt (sodium) per serving. Choose foods with less than 5 percent of the Daily Value (DV) of sodium. In general, foods with less than 300 milligrams (mg) of sodium per serving fit into this eating plan.  To find whole grains, look for the word \"whole\" as the first word in the ingredient list.  Shopping  Buy products labeled as \"low-sodium\" or \"no salt added.\"  Buy fresh foods. Avoid canned foods and pre-made or frozen meals.  Cooking  Try not to add salt when you cook. Use salt-free seasonings or herbs instead of table salt or sea salt. Check with your health care provider or pharmacist before using salt substitutes.  Do not meza foods. Cook foods in healthy ways, such as baking, boiling, grilling, roasting, or broiling.  Cook using oils that are good for your heart. These include olive, canola, avocado, soybean, and sunflower oil.  Meal planning    Eat a balanced diet. This should include:  4 or more servings of fruits and 4 or more servings of vegetables each day. Try to fill half of your plate with fruits and vegetables.  6-8 servings of whole grains each day.  6 or less servings of lean meat, poultry, or fish each day. 1 oz is 1 serving. A 3 oz (85 g) serving of meat is about the same size as the palm of your hand. One egg is 1 oz (28 g).  2-3 servings of low-fat dairy each day. One serving is 1 cup (237 mL).  1 serving of nuts, seeds, or beans 5 times each week.  2-3 servings of heart-healthy fats. Healthy fats called omega-3 fatty acids are found in foods such as walnuts, flaxseeds, fortified milks, and eggs. These fats are also found in cold-water fish, such as sardines, salmon, and mackerel.  Limit how much you eat of:  Canned or prepackaged foods.  Food that is high in trans fat, such as fried foods.  Food that is high in saturated fat, such as fatty meat.  Desserts and other sweets, sugary drinks, and other foods with added sugar.  Full-fat dairy products.  Do not salt foods before " eating.  Do not eat more than 4 egg yolks a week.  Try to eat at least 2 vegetarian meals a week.  Eat more home-cooked food and less restaurant, buffet, and fast food.  Lifestyle  When eating at a restaurant, ask if your food can be made with less salt or no salt.  If you drink alcohol:  Limit how much you have to:  0-1 drink a day if you are female.  0-2 drinks a day if you are male.  Know how much alcohol is in your drink. In the [place] one drink is one 12 oz bottle of beer (355 mL), one 5 oz glass of wine (148 mL), or one 1½ oz glass of hard liquor (44 mL).  General information  Avoid eating more than 2,300 mg of salt a day. If you have hypertension, you may need to reduce your sodium intake to 1,500 mg a day.  Work with your provider to stay at a healthy body weight or lose weight. Ask what the best weight range is for you.  On most days of the week, get at least 30 minutes of exercise that causes your heart to beat faster. This may include walking, swimming, or biking.  Work with your provider or dietitian to adjust your eating plan to meet your specific calorie needs.  What foods should I eat?  Fruits  All fresh, dried, or frozen fruit. Canned fruits that are in their natural juice and do not have sugar added to them.  Vegetables  Fresh or frozen vegetables that are raw, steamed, roasted, or grilled. Low-sodium or reduced-sodium tomato and vegetable juice. Low-sodium or reduced-sodium tomato sauce and tomato paste. Low-sodium or reduced-sodium canned vegetables.  Grains  Whole-grain or whole-wheat bread. Whole-grain or whole-wheat pasta. Brown rice. Oatmeal. Quinoa. Bulgur. Whole-grain and low-sodium cereals. Kajal bread. Low-fat, low-sodium crackers. Whole-wheat flour tortillas.  Meats and other proteins  Skinless chicken or turkey. Ground chicken or turkey. Pork with fat trimmed off. Fish and seafood. Egg whites. Dried beans, peas, or lentils. Unsalted nuts, nut butters, and seeds. Unsalted canned beans. Lean  cuts of beef with fat trimmed off. Low-sodium, lean precooked or cured meat, such as sausages or meat loaves.  Dairy  Low-fat (1%) or fat-free (skim) milk. Reduced-fat, low-fat, or fat-free cheeses. Nonfat, low-sodium ricotta or cottage cheese. Low-fat or nonfat yogurt. Low-fat, low-sodium cheese.  Fats and oils  Soft margarine without trans fats. Vegetable oil. Reduced-fat, low-fat, or light mayonnaise and salad dressings (reduced-sodium). Canola, safflower, olive, avocado, soybean, and sunflower oils. Avocado.  Seasonings and condiments  Herbs. Spices. Seasoning mixes without salt.  Other foods  Unsalted popcorn and pretzels. Fat-free sweets.  The items listed above may not be all the foods and drinks you can have. Talk to a dietitian to learn more.  What foods should I avoid?  Fruits  Canned fruit in a light or heavy syrup. Fried fruit. Fruit in cream or butter sauce.  Vegetables  Creamed or fried vegetables. Vegetables in a cheese sauce. Regular canned vegetables that are not marked as low-sodium or reduced-sodium. Regular canned tomato sauce and paste that are not marked as low-sodium or reduced-sodium. Regular tomato and vegetable juices that are not marked as low-sodium or reduced-sodium. Pickles. Olives.  Grains  Baked goods made with fat, such as croissants, muffins, or some breads. Dry pasta or rice meal packs.  Meats and other proteins  Fatty cuts of meat. Ribs. Fried meat. Gonsales. Bologna, salami, and other precooked or cured meats, such as sausages or meat loaves, that are not lean and low in sodium. Fat from the back of a pig (fatback). Bratwurst. Salted nuts and seeds. Canned beans with added salt. Canned or smoked fish. Whole eggs or egg yolks. Chicken or turkey with skin.  Dairy  Whole or 2% milk, cream, and half-and-half. Whole or full-fat cream cheese. Whole-fat or sweetened yogurt. Full-fat cheese. Nondairy creamers. Whipped toppings. Processed cheese and cheese spreads.  Fats and oils  Butter.  Stick margarine. Lard. Shortening. Ghee. Gonsales fat. Tropical oils, such as coconut, palm kernel, or palm oil.  Seasonings and condiments  Onion salt, garlic salt, seasoned salt, table salt, and sea salt. Worcestershire sauce. Tartar sauce. Barbecue sauce. Teriyaki sauce. Soy sauce, including reduced-sodium soy sauce. Steak sauce. Canned and packaged gravies. Fish sauce. Oyster sauce. Cocktail sauce. Store-bought horseradish. Ketchup. Mustard. Meat flavorings and tenderizers. Bouillon cubes. Hot sauces. Pre-made or packaged marinades. Pre-made or packaged taco seasonings. Relishes. Regular salad dressings.  Other foods  Salted popcorn and pretzels.  The items listed above may not be all the foods and drinks you should avoid. Talk to a dietitian to learn more.  Where to find more information  National Heart, Lung, and Blood [place] (NHLBI): nhlbi.nih.gov  American Heart Association (AHA): heart.org  Academy of Nutrition and Dietetics: eatright.org  National Kidney Foundation (NKF): kidney.org  This information is not intended to replace advice given to you by your health care provider. Make sure you discuss any questions you have with your health care provider.  Document Revised: 01/04/2024 Document Reviewed: 01/04/2024  [employer] Patient Education © 2024 [employer]

## 2025-04-24 ENCOUNTER — TELEPHONE (OUTPATIENT)
Dept: NEUROSURGERY | Facility: CLINIC | Age: 80
End: 2025-04-24
Payer: MEDICARE

## 2025-04-24 NOTE — TELEPHONE ENCOUNTER
Dr Bass spoke w/patient's son and then asked me to contact him with a sooner appointment.  I called the patient & he stated since the 2 docs spoke he is not having any pain.  He would like to wait a couple weeks and see if pain returns.  I have bumped up his appt to 5/13/25 @ 230 pm.  He is agreeable.    BRITTANI ESCOTO [facility]  CLINICAL COORDINATOR  DR ERA BASS  [facility] [place] [facility]

## 2025-04-25 ENCOUNTER — TELEPHONE (OUTPATIENT)
Dept: UROLOGY | Facility: CLINIC | Age: 80
End: 2025-04-25
Payer: MEDICARE

## 2025-04-28 ENCOUNTER — LAB (OUTPATIENT)
Dept: LAB | Facility: HOSPITAL | Age: 80
End: 2025-04-28
Payer: MEDICARE

## 2025-04-28 DIAGNOSIS — C61 PROSTATE CANCER: ICD-10-CM

## 2025-04-28 PROCEDURE — 84153 ASSAY OF PSA TOTAL: CPT

## 2025-04-28 PROCEDURE — 36415 COLL VENOUS BLD VENIPUNCTURE: CPT

## 2025-04-29 LAB — PSA SERPL-MCNC: <0.014 NG/ML (ref 0–4)

## 2025-05-01 ENCOUNTER — OFFICE VISIT (OUTPATIENT)
Dept: UROLOGY | Facility: CLINIC | Age: 80
End: 2025-05-01
Payer: MEDICARE

## 2025-05-01 VITALS — BODY MASS INDEX: 31.55 KG/M2 | TEMPERATURE: 96.6 F | WEIGHT: 208.2 LBS | HEIGHT: 68 IN

## 2025-05-01 DIAGNOSIS — R35.1 NOCTURIA: Primary | ICD-10-CM

## 2025-05-01 DIAGNOSIS — R39.15 URINARY URGENCY: ICD-10-CM

## 2025-05-01 DIAGNOSIS — C61 PROSTATE CANCER: ICD-10-CM

## 2025-05-01 RX ORDER — OXYCODONE AND ACETAMINOPHEN 7.5; 325 MG/1; MG/1
1 TABLET ORAL EVERY 12 HOURS SCHEDULED
COMMUNITY
Start: 2025-04-22

## 2025-05-01 RX ORDER — PREGABALIN 75 MG/1
CAPSULE ORAL
COMMUNITY
Start: 2025-04-21

## 2025-05-13 ENCOUNTER — OFFICE VISIT (OUTPATIENT)
Dept: NEUROSURGERY | Facility: CLINIC | Age: 80
End: 2025-05-13
Payer: MEDICARE

## 2025-05-13 VITALS — WEIGHT: 204 LBS | HEIGHT: 68 IN | BODY MASS INDEX: 30.92 KG/M2

## 2025-05-13 DIAGNOSIS — M51.370 DEGENERATION OF INTERVERTEBRAL DISC OF LUMBOSACRAL REGION WITH DISCOGENIC BACK PAIN: Primary | ICD-10-CM

## 2025-05-13 DIAGNOSIS — M54.50 ACUTE MIDLINE LOW BACK PAIN WITHOUT SCIATICA: ICD-10-CM

## 2025-05-13 DIAGNOSIS — E66.09 CLASS 1 OBESITY DUE TO EXCESS CALORIES WITH SERIOUS COMORBIDITY AND BODY MASS INDEX (BMI) OF 31.0 TO 31.9 IN ADULT: ICD-10-CM

## 2025-05-13 DIAGNOSIS — Z78.9 NON-SMOKER: ICD-10-CM

## 2025-05-13 DIAGNOSIS — E66.811 CLASS 1 OBESITY DUE TO EXCESS CALORIES WITH SERIOUS COMORBIDITY AND BODY MASS INDEX (BMI) OF 31.0 TO 31.9 IN ADULT: ICD-10-CM

## 2025-05-13 RX ORDER — HYDROCODONE BITARTRATE AND ACETAMINOPHEN 10; 325 MG/1; MG/1
1 TABLET ORAL EVERY 8 HOURS PRN
COMMUNITY
Start: 2025-05-07

## 2025-05-13 NOTE — PATIENT INSTRUCTIONS
"PATIENT TO CONTINUE TO FOLLOW UP WITH HIS PRIMARY CARE PROVIDER FOR YEARLY PHYSICAL EXAMS TO ENSURE COMPLETE HEALTH MAINTENANCE      BMI for Adults  Body mass index (BMI) is a number found using a person's weight and height. BMI can help tell how much of a person's weight is made up of fat. BMI does not measure body fat directly. It is used instead of tests that directly measure body fat, which can be difficult and expensive.  What are BMI measurements used for?  BMI is useful to:  Find out if your weight puts you at higher risk for medical problems.  Help recommend changes, such as in diet and exercise. This can help you reach a healthy weight. BMI screening can be done again to see if these changes are working.  How is BMI calculated?  Your height and weight are measured. The BMI is found from those numbers. This can be done with [place] or metric measurements. Note that charts and online BMI calculators are available to help you find your BMI quickly and easily without doing these calculations.  To calculate your BMI in [place] measurements:  Measure your weight in pounds (lb).  Multiply the number of pounds by 703.  So, for an adult who weighs 150 lb, multiply that number by 703: 150 x 703, which equals 105,450.  Measure your height in inches. Then multiply that number by itself to get a measurement called \"inches squared.\"  So, for an adult who is 70 inches tall, the \"inches squared\" measurement is 70 inches x 70 inches, which equals 4,900 inches squared.  Divide the total from step 2 (number of lb x 703) by the total from step 3 (inches squared): 105,450 ÷ 4,900 = 21.5. This is your BMI.  To calculate your BMI in metric measurements:    Measure your weight in kilograms (kg).  For this example, the weight is 70 kg.  Measure your height in meters (m). Then multiply that number by itself to get a measurement called \"meters squared.\"  So, for an adult who is 1.75 m tall, the \"meters squared\" measurement is 1.75 m x 1.75 " m, which equals 3.1 meters squared.  Divide the number of kilograms (your weight) by the meters squared number. In this example: 70 ÷ 3.1 = 22.6. This is your BMI.  What do the results mean?  BMI charts are used to see if you are underweight, normal weight, overweight, or obese. The following guidelines will be used:  Underweight: BMI less than 18.5.  Normal weight: BMI between 18.5 and 24.9.  Overweight: BMI between 25 and 29.9.  Obese: BMI of 30 or above.  BMI is a tool and cannot diagnose a condition. Talk with your health care provider about what your BMI means for you. Keep these notes in mind:  Weight includes fat and muscle. Someone with a muscular build, such as an athlete, may have a BMI that is higher than 24.9. In cases like these, BMI is not a correct measure of body fat.  If you have a BMI of 25 or higher, your provider may need to do more testing to find out if excess body fat is the cause.  BMI is measured the same way for males and females. Females usually have more body fat than males of the same height and weight.  Where to find more information  For more information about BMI, including tools to quickly find your BMI, go to:  Centers for Disease Control and Prevention: cdc.gov  American Heart Association: heart.org  National Heart, Lung, and Blood [place]: nhlbi.nih.gov  This information is not intended to replace advice given to you by your health care provider. Make sure you discuss any questions you have with your health care provider.  Document Revised: 09/07/2023 Document Reviewed: 08/31/2023  [employer] Patient Education © 2024 [employer]DASH Eating Plan  DASH stands for Dietary Approaches to Stop Hypertension. The DASH eating plan is a healthy eating plan that has been shown to:  Lower high blood pressure (hypertension).  Reduce your risk for type 2 diabetes, heart disease, and stroke.  Help with weight loss.  What are tips for following this plan?  Reading food labels  Check food labels  "for the amount of salt (sodium) per serving. Choose foods with less than 5 percent of the Daily Value (DV) of sodium. In general, foods with less than 300 milligrams (mg) of sodium per serving fit into this eating plan.  To find whole grains, look for the word \"whole\" as the first word in the ingredient list.  Shopping  Buy products labeled as \"low-sodium\" or \"no salt added.\"  Buy fresh foods. Avoid canned foods and pre-made or frozen meals.  Cooking  Try not to add salt when you cook. Use salt-free seasonings or herbs instead of table salt or sea salt. Check with your health care provider or pharmacist before using salt substitutes.  Do not meza foods. Cook foods in healthy ways, such as baking, boiling, grilling, roasting, or broiling.  Cook using oils that are good for your heart. These include olive, canola, avocado, soybean, and sunflower oil.  Meal planning    Eat a balanced diet. This should include:  4 or more servings of fruits and 4 or more servings of vegetables each day. Try to fill half of your plate with fruits and vegetables.  6-8 servings of whole grains each day.  6 or less servings of lean meat, poultry, or fish each day. 1 oz is 1 serving. A 3 oz (85 g) serving of meat is about the same size as the palm of your hand. One egg is 1 oz (28 g).  2-3 servings of low-fat dairy each day. One serving is 1 cup (237 mL).  1 serving of nuts, seeds, or beans 5 times each week.  2-3 servings of heart-healthy fats. Healthy fats called omega-3 fatty acids are found in foods such as walnuts, flaxseeds, fortified milks, and eggs. These fats are also found in cold-water fish, such as sardines, salmon, and mackerel.  Limit how much you eat of:  Canned or prepackaged foods.  Food that is high in trans fat, such as fried foods.  Food that is high in saturated fat, such as fatty meat.  Desserts and other sweets, sugary drinks, and other foods with added sugar.  Full-fat dairy products.  Do not salt foods before " eating.  Do not eat more than 4 egg yolks a week.  Try to eat at least 2 vegetarian meals a week.  Eat more home-cooked food and less restaurant, buffet, and fast food.  Lifestyle  When eating at a restaurant, ask if your food can be made with less salt or no salt.  If you drink alcohol:  Limit how much you have to:  0-1 drink a day if you are female.  0-2 drinks a day if you are male.  Know how much alcohol is in your drink. In the [place] one drink is one 12 oz bottle of beer (355 mL), one 5 oz glass of wine (148 mL), or one 1½ oz glass of hard liquor (44 mL).  General information  Avoid eating more than 2,300 mg of salt a day. If you have hypertension, you may need to reduce your sodium intake to 1,500 mg a day.  Work with your provider to stay at a healthy body weight or lose weight. Ask what the best weight range is for you.  On most days of the week, get at least 30 minutes of exercise that causes your heart to beat faster. This may include walking, swimming, or biking.  Work with your provider or dietitian to adjust your eating plan to meet your specific calorie needs.  What foods should I eat?  Fruits  All fresh, dried, or frozen fruit. Canned fruits that are in their natural juice and do not have sugar added to them.  Vegetables  Fresh or frozen vegetables that are raw, steamed, roasted, or grilled. Low-sodium or reduced-sodium tomato and vegetable juice. Low-sodium or reduced-sodium tomato sauce and tomato paste. Low-sodium or reduced-sodium canned vegetables.  Grains  Whole-grain or whole-wheat bread. Whole-grain or whole-wheat pasta. Brown rice. Oatmeal. Quinoa. Bulgur. Whole-grain and low-sodium cereals. Kajal bread. Low-fat, low-sodium crackers. Whole-wheat flour tortillas.  Meats and other proteins  Skinless chicken or turkey. Ground chicken or turkey. Pork with fat trimmed off. Fish and seafood. Egg whites. Dried beans, peas, or lentils. Unsalted nuts, nut butters, and seeds. Unsalted canned beans. Lean  cuts of beef with fat trimmed off. Low-sodium, lean precooked or cured meat, such as sausages or meat loaves.  Dairy  Low-fat (1%) or fat-free (skim) milk. Reduced-fat, low-fat, or fat-free cheeses. Nonfat, low-sodium ricotta or cottage cheese. Low-fat or nonfat yogurt. Low-fat, low-sodium cheese.  Fats and oils  Soft margarine without trans fats. Vegetable oil. Reduced-fat, low-fat, or light mayonnaise and salad dressings (reduced-sodium). Canola, safflower, olive, avocado, soybean, and sunflower oils. Avocado.  Seasonings and condiments  Herbs. Spices. Seasoning mixes without salt.  Other foods  Unsalted popcorn and pretzels. Fat-free sweets.  The items listed above may not be all the foods and drinks you can have. Talk to a dietitian to learn more.  What foods should I avoid?  Fruits  Canned fruit in a light or heavy syrup. Fried fruit. Fruit in cream or butter sauce.  Vegetables  Creamed or fried vegetables. Vegetables in a cheese sauce. Regular canned vegetables that are not marked as low-sodium or reduced-sodium. Regular canned tomato sauce and paste that are not marked as low-sodium or reduced-sodium. Regular tomato and vegetable juices that are not marked as low-sodium or reduced-sodium. Pickles. Olives.  Grains  Baked goods made with fat, such as croissants, muffins, or some breads. Dry pasta or rice meal packs.  Meats and other proteins  Fatty cuts of meat. Ribs. Fried meat. Gonsales. Bologna, salami, and other precooked or cured meats, such as sausages or meat loaves, that are not lean and low in sodium. Fat from the back of a pig (fatback). Bratwurst. Salted nuts and seeds. Canned beans with added salt. Canned or smoked fish. Whole eggs or egg yolks. Chicken or turkey with skin.  Dairy  Whole or 2% milk, cream, and half-and-half. Whole or full-fat cream cheese. Whole-fat or sweetened yogurt. Full-fat cheese. Nondairy creamers. Whipped toppings. Processed cheese and cheese spreads.  Fats and oils  Butter.  Stick margarine. Lard. Shortening. Ghee. Gonsales fat. Tropical oils, such as coconut, palm kernel, or palm oil.  Seasonings and condiments  Onion salt, garlic salt, seasoned salt, table salt, and sea salt. Worcestershire sauce. Tartar sauce. Barbecue sauce. Teriyaki sauce. Soy sauce, including reduced-sodium soy sauce. Steak sauce. Canned and packaged gravies. Fish sauce. Oyster sauce. Cocktail sauce. Store-bought horseradish. Ketchup. Mustard. Meat flavorings and tenderizers. Bouillon cubes. Hot sauces. Pre-made or packaged marinades. Pre-made or packaged taco seasonings. Relishes. Regular salad dressings.  Other foods  Salted popcorn and pretzels.  The items listed above may not be all the foods and drinks you should avoid. Talk to a dietitian to learn more.  Where to find more information  National Heart, Lung, and Blood [place] (NHLBI): nhlbi.nih.gov  American Heart Association (AHA): heart.org  Academy of Nutrition and Dietetics: eatright.org  National Kidney Foundation (NKF): kidney.org  This information is not intended to replace advice given to you by your health care provider. Make sure you discuss any questions you have with your health care provider.  Document Revised: 01/04/2024 Document Reviewed: 01/04/2024  [employer] Patient Education © 2024 [employer]

## 2025-05-13 NOTE — PROGRESS NOTES
SUBJECTIVE:  Patient ID: Karan Piedra is a 80 y.o. male is here today for follow-up.    Chief Complaint:back pain   Chief Complaint   Patient presents with    SYMPTOM CHECK     Patient is here for follow up.  He continues to have symptoms.  He says the injection treatment is not giving him any relief.       HPI  80-year-old gentleman that we have been following for right paraspinal back pain that radiates in a radicular fashion to the right mid axillary line.  Got an extensive course of conservative care including physical therapy and multiple therapeutic and diagnostic attempts by pain management.  The only relief he is really gotten was from sitting and Norco.  Since he stands to start to twist or bend or do any sort of work he develops the pain.    The following portions of the patient's history were reviewed and updated as appropriate: allergies, current medications, past family history, past medical history, past social history, past surgical history and problem list.    OBJECTIVE:    Review of Systems   Musculoskeletal:  Positive for back pain.   All other systems reviewed and are negative.         Physical Exam  Constitutional:       General: He is awake.   Eyes:      Extraocular Movements: Extraocular movements intact.      Pupils: Pupils are equal, round, and reactive to light.   Neurological:      Motor: Motor strength is normal.     Deep Tendon Reflexes: Reflexes are normal and symmetric.   Psychiatric:         Speech: Speech normal.         Neurological Exam  Mental Status  Awake. Oriented to person, place and time. Speech is normal. Language is fluent with no aphasia. Attention and concentration are normal.    Cranial Nerves  CN I: Sense of smell is normal.  CN II: Right normal visual field. Left normal visual field.  CN III, IV, VI: Extraocular movements intact bilaterally. Pupils equal round and reactive to light bilaterally.  CN V: Facial sensation is normal.  CN VII:  Right: There is no facial  weakness.  Left: There is no facial weakness.  CN XI: Shoulder shrug strength is normal.  CN XII: Tongue midline without atrophy or fasciculations.    Motor  Normal muscle bulk throughout. Normal muscle tone. Strength is 5/5 throughout all four extremities.    Sensory  Sensation is intact to light touch, pinprick, vibration and proprioception in all four extremities.    Reflexes  Deep tendon reflexes are 2+ and symmetric in all four extremities.    Gait  Normal casual, toe, heel and tandem gait.      Independent Review of Radiographic Studies:       ASSESSMENT/PLAN:  Imaging shows a large right osteophytic spur at T11-12 which may be causing some relative foraminal stenosis or compression of the nerve at that level.  This could be the source of his pain.  He is gone through an extensive nonsurgical treatment plan without any significant relief.  I do not think it would be unreasonable at this point to consider a right T11-12 hemilaminectomy facetectomy to decompress the exiting nerve root at that level to see if that gives him any relief.  I was very clear with the patient and his wife that there is no promise or guarantee as to how much relief this would give him.  The risks and benefits of the procedure were reviewed.  They are marah consider their options and get back to us.      1. 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 Acute midline low back pain without sciatica    3. Non-smoker    4. Class 1 obesity due to excess calories with serious comorbidity and body mass index (BMI) of 31.0 to 31.9 in adult        The patient's Body mass index is 31.02 kg/m².. BMI is above normal parameters. Recommendations include: educational material    Return for PATIENT TO C/B WITH DECISION.      Mahendra Santana MD

## 2025-05-14 PROBLEM — M54.50 ACUTE MIDLINE LOW BACK PAIN WITHOUT SCIATICA: Status: ACTIVE | Noted: 2025-05-13

## 2025-05-14 PROBLEM — M51.370 DEGENERATION OF INTERVERTEBRAL DISC OF LUMBOSACRAL REGION WITH DISCOGENIC BACK PAIN: Status: ACTIVE | Noted: 2025-05-13

## 2025-05-16 ENCOUNTER — PRE-ADMISSION TESTING (OUTPATIENT)
Dept: PREADMISSION TESTING | Facility: HOSPITAL | Age: 80
End: 2025-05-16
Payer: MEDICARE

## 2025-05-16 VITALS
BODY MASS INDEX: 31.14 KG/M2 | HEIGHT: 68 IN | DIASTOLIC BLOOD PRESSURE: 65 MMHG | OXYGEN SATURATION: 99 % | RESPIRATION RATE: 16 BRPM | WEIGHT: 205.47 LBS | SYSTOLIC BLOOD PRESSURE: 146 MMHG | HEART RATE: 96 BPM

## 2025-05-16 LAB
ANION GAP SERPL CALCULATED.3IONS-SCNC: 11 MMOL/L (ref 5–15)
BUN SERPL-MCNC: 13 MG/DL (ref 8–23)
BUN/CREAT SERPL: 19.1 (ref 7–25)
CALCIUM SPEC-SCNC: 8.7 MG/DL (ref 8.6–10.5)
CHLORIDE SERPL-SCNC: 106 MMOL/L (ref 98–107)
CO2 SERPL-SCNC: 23 MMOL/L (ref 22–29)
CREAT SERPL-MCNC: 0.68 MG/DL (ref 0.76–1.27)
DEPRECATED RDW RBC AUTO: 43.1 FL (ref 37–54)
EGFRCR SERPLBLD CKD-EPI 2021: 95.1 ML/MIN/1.73
ERYTHROCYTE [DISTWIDTH] IN BLOOD BY AUTOMATED COUNT: 18.9 % (ref 12.3–15.4)
GLUCOSE SERPL-MCNC: 164 MG/DL (ref 65–99)
HCT VFR BLD AUTO: 33.1 % (ref 37.5–51)
HGB BLD-MCNC: 9.1 G/DL (ref 13–17.7)
MCH RBC QN AUTO: 17.8 PG (ref 26.6–33)
MCHC RBC AUTO-ENTMCNC: 27.5 G/DL (ref 31.5–35.7)
MCV RBC AUTO: 64.8 FL (ref 79–97)
PLATELET # BLD AUTO: 283 10*3/MM3 (ref 140–450)
PMV BLD AUTO: 9.3 FL (ref 6–12)
POTASSIUM SERPL-SCNC: 4.3 MMOL/L (ref 3.5–5.2)
RBC # BLD AUTO: 5.11 10*6/MM3 (ref 4.14–5.8)
SODIUM SERPL-SCNC: 140 MMOL/L (ref 136–145)
WBC NRBC COR # BLD AUTO: 7.2 10*3/MM3 (ref 3.4–10.8)

## 2025-05-16 PROCEDURE — 36415 COLL VENOUS BLD VENIPUNCTURE: CPT

## 2025-05-16 PROCEDURE — 80048 BASIC METABOLIC PNL TOTAL CA: CPT

## 2025-05-16 PROCEDURE — 85027 COMPLETE CBC AUTOMATED: CPT

## 2025-05-16 NOTE — DISCHARGE INSTRUCTIONS

## 2025-05-20 ENCOUNTER — HOSPITAL ENCOUNTER (OUTPATIENT)
Dept: CT IMAGING | Facility: HOSPITAL | Age: 80
Discharge: HOME | End: 2025-05-20
Admitting: NEUROLOGICAL SURGERY
Payer: MEDICARE

## 2025-05-20 DIAGNOSIS — M51.370 DEGENERATION OF INTERVERTEBRAL DISC OF LUMBOSACRAL REGION WITH DISCOGENIC BACK PAIN: ICD-10-CM

## 2025-05-20 PROCEDURE — 72131 CT LUMBAR SPINE W/O DYE: CPT

## 2025-05-23 ENCOUNTER — TELEPHONE (OUTPATIENT)
Dept: GASTROENTEROLOGY | Facility: CLINIC | Age: 80
End: 2025-05-23
Payer: MEDICARE

## 2025-05-23 NOTE — TELEPHONE ENCOUNTER
Called patient to schedule office visit with LUZ MARIA Lui-no answer-left a voicemail to call the office at [phone] and ask for Katlyn to schedule an appointment

## 2025-05-23 NOTE — TELEPHONE ENCOUNTER
Attempted to call patient to schedule office visit for anemia    Message left that video visit at 915 or 930 this morning was an option or we would get him in Tue or Wed of next week    Please try to get in touch with him again to schedule appointment       Thank you   Case Murdock, APRN

## 2025-05-27 ENCOUNTER — OFFICE VISIT (OUTPATIENT)
Dept: GASTROENTEROLOGY | Facility: CLINIC | Age: 80
End: 2025-05-27
Payer: MEDICARE

## 2025-05-27 VITALS
OXYGEN SATURATION: 100 % | TEMPERATURE: 97.6 F | HEART RATE: 78 BPM | DIASTOLIC BLOOD PRESSURE: 70 MMHG | SYSTOLIC BLOOD PRESSURE: 148 MMHG | WEIGHT: 204 LBS | HEIGHT: 68 IN | BODY MASS INDEX: 30.92 KG/M2

## 2025-05-27 DIAGNOSIS — Z86.0101 HISTORY OF ADENOMATOUS POLYP OF COLON: ICD-10-CM

## 2025-05-27 DIAGNOSIS — D50.9 IRON DEFICIENCY ANEMIA, UNSPECIFIED IRON DEFICIENCY ANEMIA TYPE: Primary | ICD-10-CM

## 2025-05-27 PROCEDURE — 3077F SYST BP >= 140 MM HG: CPT | Performed by: NURSE PRACTITIONER

## 2025-05-27 PROCEDURE — 99214 OFFICE O/P EST MOD 30 MIN: CPT | Performed by: NURSE PRACTITIONER

## 2025-05-27 PROCEDURE — 3078F DIAST BP <80 MM HG: CPT | Performed by: NURSE PRACTITIONER

## 2025-05-27 PROCEDURE — 1160F RVW MEDS BY RX/DR IN RCRD: CPT | Performed by: NURSE PRACTITIONER

## 2025-05-27 PROCEDURE — 1159F MED LIST DOCD IN RCRD: CPT | Performed by: NURSE PRACTITIONER

## 2025-05-27 RX ORDER — FERROUS SULFATE 325(65) MG
1 TABLET ORAL EVERY 12 HOURS SCHEDULED
COMMUNITY
Start: 2025-05-23

## 2025-05-27 NOTE — PROGRESS NOTES
"Chief Complaint   Patient presents with    Anemia     Low blood count having back surgery had labs        PCP: Ozzie Feliz MD  REFER: No ref. provider found    Subjective     HPI    Karan Piedra anticipates back surgery with Dr Santana June 13.  He was found to be anemic on preop lab work last week.  Karan Piedra denies observation of blood in stool.  No changes in bowel habit.  Stool sample submitted to PCP last week (I do not have results).    Karan Piedra takes Mobic and ASA on daily basis.  No PPI or H2 Blocker.   No heartburn, no indigestion.  No dysphagia.  No weight loss.      COLONOSCOPY (10/20/2021 07:15)   COLONOSCOPY (10/19/2021 08:14)   Tissue Pathology Exam (10/19/2021 08:24) tubular adenoma     SCANNED - COLONOSCOPY (08/10/2016 00:00)   Converted Surgical Pathology (08/10/2016 10:35)     Lab Results - Last 18 Months   Lab Units 05/16/25  1310   HEMOGLOBIN g/dL 9.1*   HEMATOCRIT % 33.1*   MCV fL 64.8*   WBC 10*3/mm3 7.20   PLATELETS 10*3/mm3 283       Lab Results - Last 18 Months   Lab Units 05/16/25  1310   GLUCOSE mg/dL 164*   SODIUM mmol/L 140   POTASSIUM mmol/L 4.3   CREATININE mg/dL 0.68*   BUN mg/dL 13   BUN / CREAT RATIO  19.1       No results for input(s): \"EGFRIFNONA\", \"EGFRIFAFRI\", \"EGFRRESULT\" in the last 09044 hours.    No results for input(s): \"IRON\", \"TIBC\", \"LABIRON\", \"FERRITIN\", \"B1IJJSW\", \"TSH\", \"FOLATE\" in the last 83647 hours.    Invalid input(s): \"VITB12\"      Past Medical History:   Diagnosis Date    Arthritis     Colon polyps     High cholesterol     HL (hearing loss) proably    Hypertension     Low back pain     Melanoma     Mycobacterium avium complex     Prostate CA     PSA elevation     Seasonal allergies        Past Surgical History:   Procedure Laterality Date    COLONOSCOPY  08/10/2016    five 5 mm polyps in the transverse colon,at the hepatic flexure and in the cecum  resected and retrieved    COLONOSCOPY N/A 10/19/2021    Procedure: COLONOSCOPY " WITH ANESTHESIA;  Surgeon: Scotty Jorgensen DO;  Location:  [facility];  Service: Gastroenterology;  Laterality: N/A;  pre adenomatous polyp  post polyps  Ozzie Feliz MD    COLONOSCOPY N/A 10/20/2021    Procedure: COLONOSCOPY WITH ANESTHESIA;  Surgeon: Scotty Jorgensen DO;  Location:  [facility];  Service: Gastroenterology;  Laterality: N/A;  pre: hx polyps  post: diverticulosis  Ozzie Feliz MD    HERNIA REPAIR      abdominal    PROSTATE BIOPSY N/A 8/7/2018    Procedure: PROSTATE ULTRASOUND  URONAV FUSION BIOPSY;  Surgeon: Chemo Tierney MD;  Location:  [facility];  Service: Urology    PROSTATE ULTRASOUND BIOPSY  12/2016    negative    PROSTATECTOMY N/A 11/13/2018    Procedure: PROSTATECTOMY LAPAROSCOPIC WITH DAVINCI SI ROBOT WITH PELVIC LYMPH NODE DISSECTION;  Surgeon: Chemo Tierney MD;  Location:  [facility];  Service: DaVinci    ROTATOR CUFF REPAIR Bilateral     SKIN LESION EXCISION Right 2/15/2022    Procedure: 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 with superior M-plasty closure.;  Surgeon: Ye Agrueta MD;  Location:  [facility];  Service: ENT;  Laterality: Right;    TONSILLECTOMY         Outpatient Medications Marked as Taking for the 5/27/25 encounter (Office Visit) with Case Murdock APRN   Medication Sig Dispense Refill    FeroSul 325 (65 Fe) MG tablet Take 1 tablet by mouth Every 12 (Twelve) Hours.      hydroCHLOROthiazide 12.5 MG tablet       HYDROcodone-acetaminophen (NORCO)  MG per tablet Take 1 tablet by mouth Every 8 (Eight) Hours As Needed. for pain      irbesartan (AVAPRO) 300 MG tablet Take 1 tablet by mouth Daily.      meloxicam (MOBIC) 7.5 MG tablet Take 1 tablet by mouth Daily.      rosuvastatin (CRESTOR) 10 MG tablet       tiZANidine (ZANAFLEX) 4 MG tablet Take 1 tablet by mouth Every 6 (Six) Hours As Needed.         No Known Allergies    Social History     Socioeconomic History    Marital status:   "   Number of children: 2   Tobacco Use    Smoking status: Never     Passive exposure: Never    Smokeless tobacco: Never   Vaping Use    Vaping status: Never Used   Substance and Sexual Activity    Alcohol use: Yes     Comment: rare    Drug use: No    Sexual activity: Defer       Review of Systems   Constitutional:  Negative for fever and unexpected weight change.   HENT:  Negative for trouble swallowing.    Respiratory:  Negative for shortness of breath.    Cardiovascular:  Negative for chest pain.   Gastrointestinal:  Negative for abdominal pain and anal bleeding.           Objective     Vitals:    05/27/25 0918   BP: 148/70   Pulse: 78   Temp: 97.6 °F (36.4 °C)   SpO2: 100%   Weight: 92.5 kg (204 lb)   Height: 172.7 cm (68\")     Body mass index is 31.02 kg/m².    Physical Exam  Constitutional:       Appearance: Normal appearance. He is well-developed.   Eyes:      General: No scleral icterus.  Cardiovascular:      Heart sounds: Normal heart sounds. No murmur heard.  Pulmonary:      Effort: Pulmonary effort is normal.   Abdominal:      General: Bowel sounds are normal. There is no distension.      Palpations: Abdomen is soft.      Tenderness: There is no abdominal tenderness. There is no guarding.   Skin:     General: Skin is warm and dry.      Coloration: Skin is not jaundiced.   Neurological:      Mental Status: He is alert.   Psychiatric:         Behavior: Behavior is cooperative.         Imaging Results (Most Recent)       None            Body mass index is 31.02 kg/m².    Assessment & Plan     Diagnoses and all orders for this visit:    1. Iron deficiency anemia, unspecified iron deficiency anemia type (Primary)  -     Case Request; Standing  -     Implement Anesthesia Orders Day of Procedure; Standing  -     Follow Anesthesia Guidelines / Protocol; Future  -     Verify bowel prep was successful; Standing  -     Give tap water enema if bowel prep was insufficient; Standing  -     Obtain Informed Consent; " Standing  -     Case Request    2. History of adenomatous polyp of colon  -     Case Request; Standing  -     Implement Anesthesia Orders Day of Procedure; Standing  -     Follow Anesthesia Guidelines / Protocol; Future  -     Verify bowel prep was successful; Standing  -     Give tap water enema if bowel prep was insufficient; Standing  -     Obtain Informed Consent; Standing  -     Case Request         COLONOSCOPY WITH ANESTHESIA (examination of colon) (N/A), ESOPHAGOGASTRODUODENOSCOPY WITH ANESTHESIA-(examination of esophagus, stomach, top portion of small intestine) (N/A)      Hold mobic for now      Hgb on preop lab work dated 5/16 was 9.1.  hgb decreased further to 8.8 on recheck at PCP office 5/21/25.  MCV noted to be low as well.   BUN/Cr ratio 19.1.   he has utilized NSAIDs on regular basis.   Due to upcoming back surgery I recommend endo/colon to ensure no underlying contributing factor to low blood count.  Karan GREEN Piedra verbalized understanding and willing to proceed    Karan Piedra was not adequately cleaned out prior to last procedure.      I recommend 1-2 doses of miralax today (samples given)- adjust dose if needed.  Take 4 doses of miralax Wed night and proceed with Miralax prep on Thur  Karan Piedra was asked to call office with concerns, questions, or clarification    Procedures scheduled for this Friday (today is Tue) with further recommendation pending findings.       The risks of the endoscopy were discussed in detail.  We discussed the risks of perforation (one out of 4096-6973, riskier with dilation), bleeding (one out of 500), and the rare risks of infection, adverse reaction to anesthesia, respiratory failure, cardiac failure including MI and adverse reaction to medications, etc.  We discussed consequences that could occur if a risk were to develop such as the need for hospitalization, blood transfusion, surgical intervention, medications, pain and disability and death.   Alternatives include not doing anything, or pursuing an UGI series which only offers a diagnosis with potential less accuracy compared to EGD.  The patient verbalizes understanding and agrees to proceed.      All risks, benefits, alternatives, and indications of colonoscopy procedure have been discussed with the patient. Risks to include perforation of the colon requiring possible surgery or colostomy, risk of bleeding from biopsies or removal of colon tissue, possibility of missing a colon polyp or cancer, or adverse drug reaction.  Benefits to include the diagnosis and management of disease of the colon and rectum. Alternatives to include barium enema, radiographic evaluation, lab testing or no intervention. Pt verbalizes understanding and agrees to proceed with procedure.          Case Murdock, APRN  05/27/25          There are no Patient Instructions on file for this visit.

## 2025-05-27 NOTE — H&P (VIEW-ONLY)
"Chief Complaint   Patient presents with    Anemia     Low blood count having back surgery had labs        PCP: Ozzie Feliz MD  REFER: No ref. provider found    Subjective     HPI    Karan Piedra anticipates back surgery with Dr Santana June 13.  He was found to be anemic on preop lab work last week.  Karan Piedra denies observation of blood in stool.  No changes in bowel habit.  Stool sample submitted to PCP last week (I do not have results).    Karan Piedra takes Mobic and ASA on daily basis.  No PPI or H2 Blocker.   No heartburn, no indigestion.  No dysphagia.  No weight loss.      COLONOSCOPY (10/20/2021 07:15)   COLONOSCOPY (10/19/2021 08:14)   Tissue Pathology Exam (10/19/2021 08:24) tubular adenoma     SCANNED - COLONOSCOPY (08/10/2016 00:00)   Converted Surgical Pathology (08/10/2016 10:35)     Lab Results - Last 18 Months   Lab Units 05/16/25  1310   HEMOGLOBIN g/dL 9.1*   HEMATOCRIT % 33.1*   MCV fL 64.8*   WBC 10*3/mm3 7.20   PLATELETS 10*3/mm3 283       Lab Results - Last 18 Months   Lab Units 05/16/25  1310   GLUCOSE mg/dL 164*   SODIUM mmol/L 140   POTASSIUM mmol/L 4.3   CREATININE mg/dL 0.68*   BUN mg/dL 13   BUN / CREAT RATIO  19.1       No results for input(s): \"EGFRIFNONA\", \"EGFRIFAFRI\", \"EGFRRESULT\" in the last 80727 hours.    No results for input(s): \"IRON\", \"TIBC\", \"LABIRON\", \"FERRITIN\", \"D8MFRKL\", \"TSH\", \"FOLATE\" in the last 54961 hours.    Invalid input(s): \"VITB12\"      Past Medical History:   Diagnosis Date    Arthritis     Colon polyps     High cholesterol     HL (hearing loss) proably    Hypertension     Low back pain     Melanoma     Mycobacterium avium complex     Prostate CA     PSA elevation     Seasonal allergies        Past Surgical History:   Procedure Laterality Date    COLONOSCOPY  08/10/2016    five 5 mm polyps in the transverse colon,at the hepatic flexure and in the cecum  resected and retrieved    COLONOSCOPY N/A 10/19/2021    Procedure: COLONOSCOPY " WITH ANESTHESIA;  Surgeon: Scotty Jorgensen DO;  Location:  [facility];  Service: Gastroenterology;  Laterality: N/A;  pre adenomatous polyp  post polyps  Ozzie Feliz MD    COLONOSCOPY N/A 10/20/2021    Procedure: COLONOSCOPY WITH ANESTHESIA;  Surgeon: Scotty Jorgensen DO;  Location:  [facility];  Service: Gastroenterology;  Laterality: N/A;  pre: hx polyps  post: diverticulosis  Ozzie Feliz MD    HERNIA REPAIR      abdominal    PROSTATE BIOPSY N/A 8/7/2018    Procedure: PROSTATE ULTRASOUND  URONAV FUSION BIOPSY;  Surgeon: Chemo Tierney MD;  Location:  [facility];  Service: Urology    PROSTATE ULTRASOUND BIOPSY  12/2016    negative    PROSTATECTOMY N/A 11/13/2018    Procedure: PROSTATECTOMY LAPAROSCOPIC WITH DAVINCI SI ROBOT WITH PELVIC LYMPH NODE DISSECTION;  Surgeon: Chemo Tierney MD;  Location:  [facility];  Service: DaVinci    ROTATOR CUFF REPAIR Bilateral     SKIN LESION EXCISION Right 2/15/2022    Procedure: 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 with superior M-plasty closure.;  Surgeon: Ye Argueta MD;  Location:  [facility];  Service: ENT;  Laterality: Right;    TONSILLECTOMY         Outpatient Medications Marked as Taking for the 5/27/25 encounter (Office Visit) with Case Murdock APRN   Medication Sig Dispense Refill    FeroSul 325 (65 Fe) MG tablet Take 1 tablet by mouth Every 12 (Twelve) Hours.      hydroCHLOROthiazide 12.5 MG tablet       HYDROcodone-acetaminophen (NORCO)  MG per tablet Take 1 tablet by mouth Every 8 (Eight) Hours As Needed. for pain      irbesartan (AVAPRO) 300 MG tablet Take 1 tablet by mouth Daily.      meloxicam (MOBIC) 7.5 MG tablet Take 1 tablet by mouth Daily.      rosuvastatin (CRESTOR) 10 MG tablet       tiZANidine (ZANAFLEX) 4 MG tablet Take 1 tablet by mouth Every 6 (Six) Hours As Needed.         No Known Allergies    Social History     Socioeconomic History    Marital status:   "   Number of children: 2   Tobacco Use    Smoking status: Never     Passive exposure: Never    Smokeless tobacco: Never   Vaping Use    Vaping status: Never Used   Substance and Sexual Activity    Alcohol use: Yes     Comment: rare    Drug use: No    Sexual activity: Defer       Review of Systems   Constitutional:  Negative for fever and unexpected weight change.   HENT:  Negative for trouble swallowing.    Respiratory:  Negative for shortness of breath.    Cardiovascular:  Negative for chest pain.   Gastrointestinal:  Negative for abdominal pain and anal bleeding.           Objective     Vitals:    05/27/25 0918   BP: 148/70   Pulse: 78   Temp: 97.6 °F (36.4 °C)   SpO2: 100%   Weight: 92.5 kg (204 lb)   Height: 172.7 cm (68\")     Body mass index is 31.02 kg/m².    Physical Exam  Constitutional:       Appearance: Normal appearance. He is well-developed.   Eyes:      General: No scleral icterus.  Cardiovascular:      Heart sounds: Normal heart sounds. No murmur heard.  Pulmonary:      Effort: Pulmonary effort is normal.   Abdominal:      General: Bowel sounds are normal. There is no distension.      Palpations: Abdomen is soft.      Tenderness: There is no abdominal tenderness. There is no guarding.   Skin:     General: Skin is warm and dry.      Coloration: Skin is not jaundiced.   Neurological:      Mental Status: He is alert.   Psychiatric:         Behavior: Behavior is cooperative.         Imaging Results (Most Recent)       None            Body mass index is 31.02 kg/m².    Assessment & Plan     Diagnoses and all orders for this visit:    1. Iron deficiency anemia, unspecified iron deficiency anemia type (Primary)  -     Case Request; Standing  -     Implement Anesthesia Orders Day of Procedure; Standing  -     Follow Anesthesia Guidelines / Protocol; Future  -     Verify bowel prep was successful; Standing  -     Give tap water enema if bowel prep was insufficient; Standing  -     Obtain Informed Consent; " Standing  -     Case Request    2. History of adenomatous polyp of colon  -     Case Request; Standing  -     Implement Anesthesia Orders Day of Procedure; Standing  -     Follow Anesthesia Guidelines / Protocol; Future  -     Verify bowel prep was successful; Standing  -     Give tap water enema if bowel prep was insufficient; Standing  -     Obtain Informed Consent; Standing  -     Case Request         COLONOSCOPY WITH ANESTHESIA (examination of colon) (N/A), ESOPHAGOGASTRODUODENOSCOPY WITH ANESTHESIA-(examination of esophagus, stomach, top portion of small intestine) (N/A)      Hold mobic for now      Hgb on preop lab work dated 5/16 was 9.1.  hgb decreased further to 8.8 on recheck at PCP office 5/21/25.  MCV noted to be low as well.   BUN/Cr ratio 19.1.   he has utilized NSAIDs on regular basis.   Due to upcoming back surgery I recommend endo/colon to ensure no underlying contributing factor to low blood count.  Karan GREEN Piedra verbalized understanding and willing to proceed    Karan Piedra was not adequately cleaned out prior to last procedure.      I recommend 1-2 doses of miralax today (samples given)- adjust dose if needed.  Take 4 doses of miralax Wed night and proceed with Miralax prep on Thur  Karan Piedra was asked to call office with concerns, questions, or clarification    Procedures scheduled for this Friday (today is Tue) with further recommendation pending findings.       The risks of the endoscopy were discussed in detail.  We discussed the risks of perforation (one out of 1515-2614, riskier with dilation), bleeding (one out of 500), and the rare risks of infection, adverse reaction to anesthesia, respiratory failure, cardiac failure including MI and adverse reaction to medications, etc.  We discussed consequences that could occur if a risk were to develop such as the need for hospitalization, blood transfusion, surgical intervention, medications, pain and disability and death.   Alternatives include not doing anything, or pursuing an UGI series which only offers a diagnosis with potential less accuracy compared to EGD.  The patient verbalizes understanding and agrees to proceed.      All risks, benefits, alternatives, and indications of colonoscopy procedure have been discussed with the patient. Risks to include perforation of the colon requiring possible surgery or colostomy, risk of bleeding from biopsies or removal of colon tissue, possibility of missing a colon polyp or cancer, or adverse drug reaction.  Benefits to include the diagnosis and management of disease of the colon and rectum. Alternatives to include barium enema, radiographic evaluation, lab testing or no intervention. Pt verbalizes understanding and agrees to proceed with procedure.          Case Murdock, APRN  05/27/25          There are no Patient Instructions on file for this visit.

## 2025-05-30 ENCOUNTER — ANESTHESIA EVENT (OUTPATIENT)
Dept: GASTROENTEROLOGY | Facility: HOSPITAL | Age: 80
End: 2025-05-30
Payer: MEDICARE

## 2025-05-30 ENCOUNTER — ANESTHESIA (OUTPATIENT)
Dept: GASTROENTEROLOGY | Facility: HOSPITAL | Age: 80
End: 2025-05-30
Payer: MEDICARE

## 2025-05-30 ENCOUNTER — HOSPITAL ENCOUNTER (OUTPATIENT)
Facility: HOSPITAL | Age: 80
Setting detail: HOSPITAL OUTPATIENT SURGERY
Discharge: HOME | End: 2025-05-30
Attending: INTERNAL MEDICINE | Admitting: INTERNAL MEDICINE
Payer: MEDICARE

## 2025-05-30 VITALS
RESPIRATION RATE: 16 BRPM | SYSTOLIC BLOOD PRESSURE: 125 MMHG | HEART RATE: 76 BPM | DIASTOLIC BLOOD PRESSURE: 58 MMHG | TEMPERATURE: 97 F | HEIGHT: 67 IN | WEIGHT: 199 LBS | OXYGEN SATURATION: 98 % | BODY MASS INDEX: 31.23 KG/M2

## 2025-05-30 DIAGNOSIS — Z86.0101 HISTORY OF ADENOMATOUS POLYP OF COLON: ICD-10-CM

## 2025-05-30 DIAGNOSIS — D50.9 IRON DEFICIENCY ANEMIA, UNSPECIFIED IRON DEFICIENCY ANEMIA TYPE: ICD-10-CM

## 2025-05-30 PROCEDURE — 25010000002 LIDOCAINE PF 2% 2 % SOLUTION: Performed by: NURSE ANESTHETIST, CERTIFIED REGISTERED

## 2025-05-30 PROCEDURE — 88305 TISSUE EXAM BY PATHOLOGIST: CPT | Performed by: INTERNAL MEDICINE

## 2025-05-30 PROCEDURE — 25010000002 PROPOFOL 10 MG/ML EMULSION: Performed by: NURSE ANESTHETIST, CERTIFIED REGISTERED

## 2025-05-30 PROCEDURE — 25810000003 SODIUM CHLORIDE 0.9 % SOLUTION: Performed by: ANESTHESIOLOGY

## 2025-05-30 PROCEDURE — 45378 DIAGNOSTIC COLONOSCOPY: CPT | Performed by: INTERNAL MEDICINE

## 2025-05-30 PROCEDURE — 43239 EGD BIOPSY SINGLE/MULTIPLE: CPT | Performed by: INTERNAL MEDICINE

## 2025-05-30 RX ORDER — LIDOCAINE HYDROCHLORIDE 20 MG/ML
INJECTION, SOLUTION EPIDURAL; INFILTRATION; INTRACAUDAL; PERINEURAL AS NEEDED
Status: DISCONTINUED | OUTPATIENT
Start: 2025-05-30 | End: 2025-05-30 | Stop reason: SURG

## 2025-05-30 RX ORDER — SODIUM CHLORIDE 0.9 % (FLUSH) 0.9 %
10 SYRINGE (ML) INJECTION AS NEEDED
Status: DISCONTINUED | OUTPATIENT
Start: 2025-05-30 | End: 2025-05-30 | Stop reason: HOSPADM

## 2025-05-30 RX ORDER — SODIUM CHLORIDE 9 MG/ML
1000 INJECTION, SOLUTION INTRAVENOUS CONTINUOUS
Status: DISCONTINUED | OUTPATIENT
Start: 2025-05-30 | End: 2025-05-30 | Stop reason: HOSPADM

## 2025-05-30 RX ORDER — LIDOCAINE HYDROCHLORIDE 10 MG/ML
0.5 INJECTION, SOLUTION EPIDURAL; INFILTRATION; INTRACAUDAL; PERINEURAL ONCE AS NEEDED
Status: DISCONTINUED | OUTPATIENT
Start: 2025-05-30 | End: 2025-05-30 | Stop reason: HOSPADM

## 2025-05-30 RX ORDER — SODIUM CHLORIDE 9 MG/ML
500 INJECTION, SOLUTION INTRAVENOUS CONTINUOUS PRN
Status: DISCONTINUED | OUTPATIENT
Start: 2025-05-30 | End: 2025-05-30 | Stop reason: HOSPADM

## 2025-05-30 RX ORDER — PROPOFOL 10 MG/ML
VIAL (ML) INTRAVENOUS AS NEEDED
Status: DISCONTINUED | OUTPATIENT
Start: 2025-05-30 | End: 2025-05-30 | Stop reason: SURG

## 2025-05-30 RX ADMIN — SODIUM CHLORIDE 500 ML: 9 INJECTION, SOLUTION INTRAVENOUS at 08:19

## 2025-05-30 RX ADMIN — PROPOFOL 350 MG: 10 INJECTION, EMULSION INTRAVENOUS at 09:23

## 2025-05-30 RX ADMIN — LIDOCAINE HYDROCHLORIDE 40 MG: 20 INJECTION, SOLUTION EPIDURAL; INFILTRATION; INTRACAUDAL; PERINEURAL at 09:07

## 2025-05-30 NOTE — ANESTHESIA POSTPROCEDURE EVALUATION
Patient: Karan Piedra    Procedure Summary       Date: 05/30/25 Room / Location: [facility] ENDOSCOPY 2 / [facility]    Anesthesia Start: 0904 Anesthesia Stop: 0925    Procedures:       COLONOSCOPY WITH ANESTHESIA (examination of colon)      ESOPHAGOGASTRODUODENOSCOPY WITH ANESTHESIA-(examination of esophagus, stomach, top portion of small intestine) Diagnosis:       Iron deficiency anemia, unspecified iron deficiency anemia type      History of adenomatous polyp of colon      (Iron deficiency anemia, unspecified iron deficiency anemia type [D50.9])      (History of adenomatous polyp of colon [Z86.0101])    Surgeons: Scotty Jorgensen DO Provider: Manpreet Ellison CRNA    Anesthesia Type: MAC ASA Status: 2            Anesthesia Type: MAC    Vitals  Vitals Value Taken Time   /58 05/30/25 09:53   Temp     Pulse 76 05/30/25 09:53   Resp 16 05/30/25 09:53   SpO2 98 % 05/30/25 09:53           Post Anesthesia Care and Evaluation    Patient location during evaluation: PHASE II  Level of consciousness: awake  Pain management: adequate  Anesthetic complications: No anesthetic complications  PONV Status: none  Cardiovascular status: acceptable  Respiratory status: acceptable  Hydration status: acceptable

## 2025-05-30 NOTE — ANESTHESIA PREPROCEDURE EVALUATION
Anesthesia Evaluation     no history of anesthetic complications:   NPO Solid Status: > 8 hours  NPO Liquid Status: > 2 hours           Airway   Mallampati: I  No difficulty expected  Dental      Pulmonary    (-) sleep apnea, not a smoker  Cardiovascular   Exercise tolerance: good (4-7 METS)    (+) hypertension, hyperlipidemia  (-) CAD      Neuro/Psych  GI/Hepatic/Renal/Endo      Musculoskeletal     Abdominal    Substance History      OB/GYN          Other   arthritis,   history of cancer (melanoma, prostate cancer) remission                Anesthesia Plan    ASA 2     MAC     intravenous induction     Anesthetic plan, risks, benefits, and alternatives have been provided, discussed and informed consent has been obtained with: patient.    CODE STATUS:

## 2025-06-02 LAB
LAB AP CASE REPORT: NORMAL
Lab: NORMAL
PATH REPORT.FINAL DX SPEC: NORMAL
PATH REPORT.GROSS SPEC: NORMAL

## 2025-06-13 ENCOUNTER — APPOINTMENT (OUTPATIENT)
Dept: GENERAL RADIOLOGY | Facility: HOSPITAL | Age: 80
End: 2025-06-13
Payer: MEDICARE

## 2025-06-13 ENCOUNTER — HOSPITAL ENCOUNTER (OUTPATIENT)
Facility: HOSPITAL | Age: 80
LOS: 1 days | Discharge: HOME | End: 2025-06-14
Attending: NEUROLOGICAL SURGERY | Admitting: NEUROLOGICAL SURGERY
Payer: MEDICARE

## 2025-06-13 ENCOUNTER — ANESTHESIA (OUTPATIENT)
Dept: PERIOP | Facility: HOSPITAL | Age: 80
End: 2025-06-13
Payer: MEDICARE

## 2025-06-13 ENCOUNTER — ANESTHESIA EVENT (OUTPATIENT)
Dept: PERIOP | Facility: HOSPITAL | Age: 80
End: 2025-06-13
Payer: MEDICARE

## 2025-06-13 DIAGNOSIS — M54.50 ACUTE MIDLINE LOW BACK PAIN WITHOUT SCIATICA: ICD-10-CM

## 2025-06-13 DIAGNOSIS — M51.370 DEGENERATION OF INTERVERTEBRAL DISC OF LUMBOSACRAL REGION WITH DISCOGENIC BACK PAIN: ICD-10-CM

## 2025-06-13 DIAGNOSIS — M43.24 FUSION OF SPINE, THORACIC REGION: ICD-10-CM

## 2025-06-13 DIAGNOSIS — Z74.09 IMPAIRED MOBILITY: Primary | ICD-10-CM

## 2025-06-13 LAB
HCT VFR BLD AUTO: 37.7 % (ref 37.5–51)
HGB BLD-MCNC: 10.9 G/DL (ref 13–17.7)

## 2025-06-13 PROCEDURE — 25010000002 HYDROMORPHONE 1 MG/ML SOLUTION: Performed by: NURSE ANESTHETIST, CERTIFIED REGISTERED

## 2025-06-13 PROCEDURE — 97116 GAIT TRAINING THERAPY: CPT

## 2025-06-13 PROCEDURE — 25810000003 LACTATED RINGERS PER 1000 ML: Performed by: ANESTHESIOLOGY

## 2025-06-13 PROCEDURE — 22842 INSERT SPINE FIXATION DEVICE: CPT | Performed by: NEUROLOGICAL SURGERY

## 2025-06-13 PROCEDURE — 63710000001 FERROUS SULFATE 325 (65 FE) MG TABLET: Performed by: NEUROLOGICAL SURGERY

## 2025-06-13 PROCEDURE — S0260 H&P FOR SURGERY: HCPCS | Performed by: NEUROLOGICAL SURGERY

## 2025-06-13 PROCEDURE — C1713 ANCHOR/SCREW BN/BN,TIS/BN: HCPCS | Performed by: NEUROLOGICAL SURGERY

## 2025-06-13 PROCEDURE — A9270 NON-COVERED ITEM OR SERVICE: HCPCS | Performed by: NEUROLOGICAL SURGERY

## 2025-06-13 PROCEDURE — 8E0WXBZ COMPUTER ASSISTED PROCEDURE OF TRUNK REGION: ICD-10-PCS | Performed by: NEUROLOGICAL SURGERY

## 2025-06-13 PROCEDURE — 25010000002 LIDOCAINE PF 2% 2 % SOLUTION: Performed by: NURSE ANESTHETIST, CERTIFIED REGISTERED

## 2025-06-13 PROCEDURE — 25010000002 ONDANSETRON PER 1 MG: Performed by: NURSE ANESTHETIST, CERTIFIED REGISTERED

## 2025-06-13 PROCEDURE — 0RB90ZZ EXCISION OF THORACIC VERTEBRAL DISC, OPEN APPROACH: ICD-10-PCS | Performed by: NEUROLOGICAL SURGERY

## 2025-06-13 PROCEDURE — 97165 OT EVAL LOW COMPLEX 30 MIN: CPT

## 2025-06-13 PROCEDURE — 63710000001 ACETAMINOPHEN 325 MG TABLET: Performed by: NEUROLOGICAL SURGERY

## 2025-06-13 PROCEDURE — 25010000002 MORPHINE PER 10 MG: Performed by: NEUROLOGICAL SURGERY

## 2025-06-13 PROCEDURE — 61783 SCAN PROC SPINAL: CPT | Performed by: NEUROLOGICAL SURGERY

## 2025-06-13 PROCEDURE — 25010000002 CEFAZOLIN PER 500 MG: Performed by: NEUROLOGICAL SURGERY

## 2025-06-13 PROCEDURE — 25010000002 DEXAMETHASONE PER 1 MG: Performed by: NURSE ANESTHETIST, CERTIFIED REGISTERED

## 2025-06-13 PROCEDURE — 25810000003 SODIUM CHLORIDE 0.9 % SOLUTION: Performed by: NEUROLOGICAL SURGERY

## 2025-06-13 PROCEDURE — 25010000002 PROPOFOL 200 MG/20ML EMULSION: Performed by: NURSE ANESTHETIST, CERTIFIED REGISTERED

## 2025-06-13 PROCEDURE — 72070 X-RAY EXAM THORAC SPINE 2VWS: CPT

## 2025-06-13 PROCEDURE — 85018 HEMOGLOBIN: CPT | Performed by: ANESTHESIOLOGY

## 2025-06-13 PROCEDURE — 25010000002 FENTANYL CITRATE (PF) 100 MCG/2ML SOLUTION: Performed by: NURSE ANESTHETIST, CERTIFIED REGISTERED

## 2025-06-13 PROCEDURE — 01N80ZZ RELEASE THORACIC NERVE, OPEN APPROACH: ICD-10-PCS | Performed by: NEUROLOGICAL SURGERY

## 2025-06-13 PROCEDURE — 25010000002 MIDAZOLAM PER 1MG: Performed by: ANESTHESIOLOGY

## 2025-06-13 PROCEDURE — 25810000003 LACTATED RINGERS PER 1000 ML: Performed by: NEUROLOGICAL SURGERY

## 2025-06-13 PROCEDURE — 63710000001 CARISOPRODOL 350 MG TABLET: Performed by: NEUROLOGICAL SURGERY

## 2025-06-13 PROCEDURE — 76000 FLUOROSCOPY <1 HR PHYS/QHP: CPT

## 2025-06-13 PROCEDURE — 25010000002 VASOPRESSIN 20 UNIT/ML SOLUTION: Performed by: NURSE ANESTHETIST, CERTIFIED REGISTERED

## 2025-06-13 PROCEDURE — 85014 HEMATOCRIT: CPT | Performed by: ANESTHESIOLOGY

## 2025-06-13 PROCEDURE — 63046 LAM FACETEC & FORAMOT THRC: CPT | Performed by: NEUROLOGICAL SURGERY

## 2025-06-13 PROCEDURE — 63710000001 OXYCODONE 5 MG TABLET: Performed by: NEUROLOGICAL SURGERY

## 2025-06-13 PROCEDURE — 97161 PT EVAL LOW COMPLEX 20 MIN: CPT | Performed by: PHYSICAL THERAPIST

## 2025-06-13 DEVICE — MAS 54850016540 4.75 EXT TAB CANN 6.5X40
Type: IMPLANTABLE DEVICE | Site: SPINE THORACIC | Status: FUNCTIONAL
Brand: CD HORIZON® SOLERA® SPINAL SYSTEM

## 2025-06-13 DEVICE — ROD SPINE CUST PERC W/PLAN/GUID 1TO3/LVL COCR 4.75MM: Type: IMPLANTABLE DEVICE | Site: SPINE THORACIC | Status: FUNCTIONAL

## 2025-06-13 DEVICE — KT HEMOST ABS SURGIFOAM PORCN 1GRAM: Type: IMPLANTABLE DEVICE | Site: SPINE THORACIC | Status: FUNCTIONAL

## 2025-06-13 DEVICE — CP BUNDLE UNID EXP ROD 1TO3/LVL NATL CUST: Type: IMPLANTABLE DEVICE | Site: SPINE THORACIC | Status: FUNCTIONAL

## 2025-06-13 DEVICE — SCRW ST SOLERA VOYAGER BRKOFF TI 4.75MM: Type: IMPLANTABLE DEVICE | Site: SPINE THORACIC | Status: FUNCTIONAL

## 2025-06-13 RX ORDER — ONDANSETRON 2 MG/ML
INJECTION INTRAMUSCULAR; INTRAVENOUS AS NEEDED
Status: DISCONTINUED | OUTPATIENT
Start: 2025-06-13 | End: 2025-06-13 | Stop reason: SURG

## 2025-06-13 RX ORDER — SODIUM CHLORIDE 0.9 % (FLUSH) 0.9 %
10 SYRINGE (ML) INJECTION AS NEEDED
Status: DISCONTINUED | OUTPATIENT
Start: 2025-06-13 | End: 2025-06-13 | Stop reason: HOSPADM

## 2025-06-13 RX ORDER — MAGNESIUM HYDROXIDE 1200 MG/15ML
LIQUID ORAL AS NEEDED
Status: DISCONTINUED | OUTPATIENT
Start: 2025-06-13 | End: 2025-06-13 | Stop reason: HOSPADM

## 2025-06-13 RX ORDER — PANTOPRAZOLE SODIUM 40 MG/1
40 TABLET, DELAYED RELEASE ORAL DAILY
COMMUNITY

## 2025-06-13 RX ORDER — MORPHINE SULFATE 2 MG/ML
2 INJECTION, SOLUTION INTRAMUSCULAR; INTRAVENOUS
Status: DISCONTINUED | OUTPATIENT
Start: 2025-06-13 | End: 2025-06-14 | Stop reason: HOSPADM

## 2025-06-13 RX ORDER — ACETAMINOPHEN 160 MG/5ML
650 SOLUTION ORAL EVERY 8 HOURS SCHEDULED
Status: DISCONTINUED | OUTPATIENT
Start: 2025-06-13 | End: 2025-06-14 | Stop reason: HOSPADM

## 2025-06-13 RX ORDER — LABETALOL HYDROCHLORIDE 5 MG/ML
5 INJECTION, SOLUTION INTRAVENOUS
Status: DISCONTINUED | OUTPATIENT
Start: 2025-06-13 | End: 2025-06-13 | Stop reason: HOSPADM

## 2025-06-13 RX ORDER — SODIUM CHLORIDE 0.9 % (FLUSH) 0.9 %
3-10 SYRINGE (ML) INJECTION AS NEEDED
Status: DISCONTINUED | OUTPATIENT
Start: 2025-06-13 | End: 2025-06-13 | Stop reason: HOSPADM

## 2025-06-13 RX ORDER — SODIUM CHLORIDE 0.9 % (FLUSH) 0.9 %
3 SYRINGE (ML) INJECTION AS NEEDED
Status: DISCONTINUED | OUTPATIENT
Start: 2025-06-13 | End: 2025-06-13 | Stop reason: HOSPADM

## 2025-06-13 RX ORDER — SODIUM CHLORIDE 0.9 % (FLUSH) 0.9 %
3 SYRINGE (ML) INJECTION EVERY 12 HOURS SCHEDULED
Status: DISCONTINUED | OUTPATIENT
Start: 2025-06-13 | End: 2025-06-13 | Stop reason: HOSPADM

## 2025-06-13 RX ORDER — DEXAMETHASONE SODIUM PHOSPHATE 4 MG/ML
INJECTION, SOLUTION INTRA-ARTICULAR; INTRALESIONAL; INTRAMUSCULAR; INTRAVENOUS; SOFT TISSUE AS NEEDED
Status: DISCONTINUED | OUTPATIENT
Start: 2025-06-13 | End: 2025-06-13 | Stop reason: SURG

## 2025-06-13 RX ORDER — MIDAZOLAM HYDROCHLORIDE 2 MG/2ML
0.5 INJECTION, SOLUTION INTRAMUSCULAR; INTRAVENOUS
Status: DISCONTINUED | OUTPATIENT
Start: 2025-06-13 | End: 2025-06-13 | Stop reason: HOSPADM

## 2025-06-13 RX ORDER — SODIUM CHLORIDE 9 MG/ML
75 INJECTION, SOLUTION INTRAVENOUS CONTINUOUS
Status: DISPENSED | OUTPATIENT
Start: 2025-06-13 | End: 2025-06-13

## 2025-06-13 RX ORDER — SODIUM CHLORIDE 0.9 % (FLUSH) 0.9 %
3 SYRINGE (ML) INJECTION EVERY 12 HOURS SCHEDULED
Status: DISCONTINUED | OUTPATIENT
Start: 2025-06-13 | End: 2025-06-14 | Stop reason: HOSPADM

## 2025-06-13 RX ORDER — NALOXONE HCL 0.4 MG/ML
0.4 VIAL (ML) INJECTION
Status: DISCONTINUED | OUTPATIENT
Start: 2025-06-13 | End: 2025-06-14 | Stop reason: HOSPADM

## 2025-06-13 RX ORDER — CARISOPRODOL 350 MG/1
350 TABLET ORAL 4 TIMES DAILY PRN
Status: DISCONTINUED | OUTPATIENT
Start: 2025-06-13 | End: 2025-06-14 | Stop reason: HOSPADM

## 2025-06-13 RX ORDER — SODIUM CHLORIDE 9 MG/ML
40 INJECTION, SOLUTION INTRAVENOUS AS NEEDED
Status: DISCONTINUED | OUTPATIENT
Start: 2025-06-13 | End: 2025-06-14 | Stop reason: HOSPADM

## 2025-06-13 RX ORDER — HYDROMORPHONE HYDROCHLORIDE 1 MG/ML
0.5 INJECTION, SOLUTION INTRAMUSCULAR; INTRAVENOUS; SUBCUTANEOUS
Status: DISCONTINUED | OUTPATIENT
Start: 2025-06-13 | End: 2025-06-13 | Stop reason: HOSPADM

## 2025-06-13 RX ORDER — ONDANSETRON 2 MG/ML
4 INJECTION INTRAMUSCULAR; INTRAVENOUS
Status: DISCONTINUED | OUTPATIENT
Start: 2025-06-13 | End: 2025-06-13 | Stop reason: HOSPADM

## 2025-06-13 RX ORDER — ONDANSETRON 2 MG/ML
4 INJECTION INTRAMUSCULAR; INTRAVENOUS EVERY 6 HOURS PRN
Status: DISCONTINUED | OUTPATIENT
Start: 2025-06-13 | End: 2025-06-14 | Stop reason: HOSPADM

## 2025-06-13 RX ORDER — FENTANYL CITRATE 50 UG/ML
50 INJECTION, SOLUTION INTRAMUSCULAR; INTRAVENOUS
Status: DISCONTINUED | OUTPATIENT
Start: 2025-06-13 | End: 2025-06-13 | Stop reason: HOSPADM

## 2025-06-13 RX ORDER — AMOXICILLIN 250 MG
2 CAPSULE ORAL 2 TIMES DAILY PRN
Status: DISCONTINUED | OUTPATIENT
Start: 2025-06-13 | End: 2025-06-14 | Stop reason: HOSPADM

## 2025-06-13 RX ORDER — OXYCODONE AND ACETAMINOPHEN 10; 325 MG/1; MG/1
1 TABLET ORAL EVERY 4 HOURS PRN
Status: DISCONTINUED | OUTPATIENT
Start: 2025-06-13 | End: 2025-06-13 | Stop reason: HOSPADM

## 2025-06-13 RX ORDER — EPHEDRINE SULFATE 50 MG/ML
INJECTION INTRAVENOUS AS NEEDED
Status: DISCONTINUED | OUTPATIENT
Start: 2025-06-13 | End: 2025-06-13 | Stop reason: SURG

## 2025-06-13 RX ORDER — ROSUVASTATIN CALCIUM 5 MG/1
5 TABLET, COATED ORAL DAILY
Status: DISCONTINUED | OUTPATIENT
Start: 2025-06-14 | End: 2025-06-14 | Stop reason: HOSPADM

## 2025-06-13 RX ORDER — LIDOCAINE HYDROCHLORIDE 20 MG/ML
INJECTION, SOLUTION EPIDURAL; INFILTRATION; INTRACAUDAL; PERINEURAL AS NEEDED
Status: DISCONTINUED | OUTPATIENT
Start: 2025-06-13 | End: 2025-06-13 | Stop reason: SURG

## 2025-06-13 RX ORDER — ACETAMINOPHEN 650 MG/1
650 SUPPOSITORY RECTAL EVERY 8 HOURS SCHEDULED
Status: DISCONTINUED | OUTPATIENT
Start: 2025-06-13 | End: 2025-06-14 | Stop reason: HOSPADM

## 2025-06-13 RX ORDER — OXYCODONE HYDROCHLORIDE 5 MG/1
10 TABLET ORAL EVERY 6 HOURS PRN
Status: DISCONTINUED | OUTPATIENT
Start: 2025-06-13 | End: 2025-06-14 | Stop reason: HOSPADM

## 2025-06-13 RX ORDER — SODIUM CHLORIDE, SODIUM LACTATE, POTASSIUM CHLORIDE, CALCIUM CHLORIDE 600; 310; 30; 20 MG/100ML; MG/100ML; MG/100ML; MG/100ML
100 INJECTION, SOLUTION INTRAVENOUS CONTINUOUS
Status: DISCONTINUED | OUTPATIENT
Start: 2025-06-13 | End: 2025-06-13

## 2025-06-13 RX ORDER — POLYETHYLENE GLYCOL 3350 17 G/17G
17 POWDER, FOR SOLUTION ORAL DAILY PRN
Status: DISCONTINUED | OUTPATIENT
Start: 2025-06-13 | End: 2025-06-14 | Stop reason: HOSPADM

## 2025-06-13 RX ORDER — BISACODYL 5 MG/1
5 TABLET, DELAYED RELEASE ORAL DAILY PRN
Status: DISCONTINUED | OUTPATIENT
Start: 2025-06-13 | End: 2025-06-14 | Stop reason: HOSPADM

## 2025-06-13 RX ORDER — IBUPROFEN 600 MG/1
600 TABLET, FILM COATED ORAL EVERY 6 HOURS PRN
Status: DISCONTINUED | OUTPATIENT
Start: 2025-06-13 | End: 2025-06-13 | Stop reason: HOSPADM

## 2025-06-13 RX ORDER — FERROUS SULFATE 325(65) MG
325 TABLET ORAL EVERY 12 HOURS SCHEDULED
Status: DISCONTINUED | OUTPATIENT
Start: 2025-06-13 | End: 2025-06-14 | Stop reason: HOSPADM

## 2025-06-13 RX ORDER — BUPIVACAINE HCL/EPINEPHRINE 0.25-.0005
VIAL (ML) INJECTION AS NEEDED
Status: DISCONTINUED | OUTPATIENT
Start: 2025-06-13 | End: 2025-06-13 | Stop reason: HOSPADM

## 2025-06-13 RX ORDER — SUCCINYLCHOLINE/SOD CL,ISO/PF 200MG/10ML
SYRINGE (ML) INTRAVENOUS AS NEEDED
Status: DISCONTINUED | OUTPATIENT
Start: 2025-06-13 | End: 2025-06-13 | Stop reason: SURG

## 2025-06-13 RX ORDER — PROPOFOL 10 MG/ML
INJECTION, EMULSION INTRAVENOUS CONTINUOUS PRN
Status: DISCONTINUED | OUTPATIENT
Start: 2025-06-13 | End: 2025-06-13 | Stop reason: SURG

## 2025-06-13 RX ORDER — SODIUM CHLORIDE, SODIUM LACTATE, POTASSIUM CHLORIDE, CALCIUM CHLORIDE 600; 310; 30; 20 MG/100ML; MG/100ML; MG/100ML; MG/100ML
1000 INJECTION, SOLUTION INTRAVENOUS CONTINUOUS
Status: DISCONTINUED | OUTPATIENT
Start: 2025-06-13 | End: 2025-06-13

## 2025-06-13 RX ORDER — FLUMAZENIL 0.1 MG/ML
0.2 INJECTION INTRAVENOUS AS NEEDED
Status: DISCONTINUED | OUTPATIENT
Start: 2025-06-13 | End: 2025-06-13 | Stop reason: HOSPADM

## 2025-06-13 RX ORDER — DEXMEDETOMIDINE HYDROCHLORIDE 4 UG/ML
INJECTION, SOLUTION INTRAVENOUS CONTINUOUS PRN
Status: DISCONTINUED | OUTPATIENT
Start: 2025-06-13 | End: 2025-06-13 | Stop reason: SURG

## 2025-06-13 RX ORDER — LOSARTAN POTASSIUM 25 MG/1
25 TABLET ORAL
Status: DISCONTINUED | OUTPATIENT
Start: 2025-06-13 | End: 2025-06-14 | Stop reason: HOSPADM

## 2025-06-13 RX ORDER — MELOXICAM 7.5 MG/1
7.5 TABLET ORAL DAILY
Status: DISCONTINUED | OUTPATIENT
Start: 2025-06-14 | End: 2025-06-14 | Stop reason: HOSPADM

## 2025-06-13 RX ORDER — FENTANYL CITRATE 50 UG/ML
INJECTION, SOLUTION INTRAMUSCULAR; INTRAVENOUS AS NEEDED
Status: DISCONTINUED | OUTPATIENT
Start: 2025-06-13 | End: 2025-06-13 | Stop reason: SURG

## 2025-06-13 RX ORDER — ACETAMINOPHEN 325 MG/1
650 TABLET ORAL EVERY 8 HOURS SCHEDULED
Status: DISCONTINUED | OUTPATIENT
Start: 2025-06-13 | End: 2025-06-14 | Stop reason: HOSPADM

## 2025-06-13 RX ORDER — ACETAMINOPHEN 500 MG
1000 TABLET ORAL ONCE
Status: COMPLETED | OUTPATIENT
Start: 2025-06-13 | End: 2025-06-13

## 2025-06-13 RX ORDER — METOPROLOL TARTRATE 1 MG/ML
INJECTION, SOLUTION INTRAVENOUS AS NEEDED
Status: DISCONTINUED | OUTPATIENT
Start: 2025-06-13 | End: 2025-06-13 | Stop reason: SURG

## 2025-06-13 RX ORDER — SODIUM CHLORIDE 0.9 % (FLUSH) 0.9 %
10 SYRINGE (ML) INJECTION AS NEEDED
Status: DISCONTINUED | OUTPATIENT
Start: 2025-06-13 | End: 2025-06-14 | Stop reason: HOSPADM

## 2025-06-13 RX ORDER — NALOXONE HCL 0.4 MG/ML
0.04 VIAL (ML) INJECTION AS NEEDED
Status: DISCONTINUED | OUTPATIENT
Start: 2025-06-13 | End: 2025-06-13 | Stop reason: HOSPADM

## 2025-06-13 RX ORDER — SODIUM CHLORIDE 9 MG/ML
40 INJECTION, SOLUTION INTRAVENOUS AS NEEDED
Status: DISCONTINUED | OUTPATIENT
Start: 2025-06-13 | End: 2025-06-13 | Stop reason: HOSPADM

## 2025-06-13 RX ORDER — LIDOCAINE HYDROCHLORIDE 10 MG/ML
0.5 INJECTION, SOLUTION EPIDURAL; INFILTRATION; INTRACAUDAL; PERINEURAL ONCE AS NEEDED
Status: DISCONTINUED | OUTPATIENT
Start: 2025-06-13 | End: 2025-06-13 | Stop reason: HOSPADM

## 2025-06-13 RX ORDER — BISACODYL 10 MG
10 SUPPOSITORY, RECTAL RECTAL DAILY PRN
Status: DISCONTINUED | OUTPATIENT
Start: 2025-06-13 | End: 2025-06-14 | Stop reason: HOSPADM

## 2025-06-13 RX ADMIN — FENTANYL CITRATE 100 MCG: 50 INJECTION, SOLUTION INTRAMUSCULAR; INTRAVENOUS at 07:18

## 2025-06-13 RX ADMIN — Medication 3 ML: at 10:59

## 2025-06-13 RX ADMIN — OXYCODONE AND ACETAMINOPHEN 1 TABLET: 325; 10 TABLET ORAL at 10:20

## 2025-06-13 RX ADMIN — PROPOFOL 150 MG: 10 INJECTION, EMULSION INTRAVENOUS at 07:19

## 2025-06-13 RX ADMIN — PROPOFOL 250 MCG/KG/MIN: 10 INJECTION, EMULSION INTRAVENOUS at 07:25

## 2025-06-13 RX ADMIN — MIDAZOLAM HYDROCHLORIDE 2 MG: 1 INJECTION, SOLUTION INTRAMUSCULAR; INTRAVENOUS at 07:34

## 2025-06-13 RX ADMIN — DEXMEDETOMIDINE HYDROCHLORIDE 0.2 MCG/KG/HR: 4 INJECTION, SOLUTION INTRAVENOUS at 07:33

## 2025-06-13 RX ADMIN — OXYCODONE HYDROCHLORIDE 10 MG: 5 TABLET ORAL at 13:54

## 2025-06-13 RX ADMIN — ACETAMINOPHEN 1000 MG: 500 TABLET, FILM COATED ORAL at 06:43

## 2025-06-13 RX ADMIN — LIDOCAINE HYDROCHLORIDE 80 MG: 20 INJECTION, SOLUTION EPIDURAL; INFILTRATION; INTRACAUDAL; PERINEURAL at 07:19

## 2025-06-13 RX ADMIN — FERROUS SULFATE TAB 325 MG (65 MG ELEMENTAL FE) 325 MG: 325 (65 FE) TAB at 12:06

## 2025-06-13 RX ADMIN — MORPHINE SULFATE 2 MG: 2 INJECTION, SOLUTION INTRAMUSCULAR; INTRAVENOUS at 22:26

## 2025-06-13 RX ADMIN — FENTANYL CITRATE 50 MCG: 50 INJECTION, SOLUTION INTRAMUSCULAR; INTRAVENOUS at 08:30

## 2025-06-13 RX ADMIN — FENTANYL CITRATE 50 MCG: 50 INJECTION, SOLUTION INTRAMUSCULAR; INTRAVENOUS at 08:28

## 2025-06-13 RX ADMIN — SODIUM CHLORIDE, POTASSIUM CHLORIDE, SODIUM LACTATE AND CALCIUM CHLORIDE 1000 ML: 600; 310; 30; 20 INJECTION, SOLUTION INTRAVENOUS at 06:00

## 2025-06-13 RX ADMIN — ONDANSETRON 4 MG: 2 INJECTION INTRAMUSCULAR; INTRAVENOUS at 09:15

## 2025-06-13 RX ADMIN — ACETAMINOPHEN 650 MG: 325 TABLET ORAL at 13:50

## 2025-06-13 RX ADMIN — HYDROMORPHONE HYDROCHLORIDE 1 MG: 1 INJECTION, SOLUTION INTRAMUSCULAR; INTRAVENOUS; SUBCUTANEOUS at 07:30

## 2025-06-13 RX ADMIN — METOPROLOL TARTRATE 1 MG: 5 INJECTION INTRAVENOUS at 09:04

## 2025-06-13 RX ADMIN — DEXAMETHASONE SODIUM PHOSPHATE 4 MG: 4 INJECTION, SOLUTION INTRA-ARTICULAR; INTRALESIONAL; INTRAMUSCULAR; INTRAVENOUS; SOFT TISSUE at 09:15

## 2025-06-13 RX ADMIN — SODIUM CHLORIDE, POTASSIUM CHLORIDE, SODIUM LACTATE AND CALCIUM CHLORIDE 100 ML/HR: 600; 310; 30; 20 INJECTION, SOLUTION INTRAVENOUS at 10:10

## 2025-06-13 RX ADMIN — SODIUM CHLORIDE, POTASSIUM CHLORIDE, SODIUM LACTATE AND CALCIUM CHLORIDE 1000 ML: 600; 310; 30; 20 INJECTION, SOLUTION INTRAVENOUS at 05:50

## 2025-06-13 RX ADMIN — Medication 3 ML: at 20:17

## 2025-06-13 RX ADMIN — Medication 140 MG: at 07:19

## 2025-06-13 RX ADMIN — PROPOFOL 50 MG: 10 INJECTION, EMULSION INTRAVENOUS at 08:20

## 2025-06-13 RX ADMIN — PROPOFOL 50 MG: 10 INJECTION, EMULSION INTRAVENOUS at 07:30

## 2025-06-13 RX ADMIN — EPHEDRINE SULFATE 10 MG: 50 INJECTION INTRAVENOUS at 08:03

## 2025-06-13 RX ADMIN — PROPOFOL 150 MCG/KG/MIN: 10 INJECTION, EMULSION INTRAVENOUS at 08:13

## 2025-06-13 RX ADMIN — CARISOPRODOL 350 MG: 350 TABLET ORAL at 21:50

## 2025-06-13 RX ADMIN — OXYCODONE HYDROCHLORIDE 10 MG: 5 TABLET ORAL at 20:15

## 2025-06-13 RX ADMIN — METOPROLOL TARTRATE 2 MG: 5 INJECTION INTRAVENOUS at 08:36

## 2025-06-13 RX ADMIN — METOPROLOL TARTRATE 1 MG: 5 INJECTION INTRAVENOUS at 08:33

## 2025-06-13 RX ADMIN — SODIUM CHLORIDE 75 ML/HR: 900 INJECTION, SOLUTION INTRAVENOUS at 10:58

## 2025-06-13 RX ADMIN — CEFAZOLIN 2000 MG: 2 INJECTION, POWDER, FOR SOLUTION INTRAMUSCULAR; INTRAVENOUS at 07:27

## 2025-06-13 RX ADMIN — ACETAMINOPHEN 650 MG: 325 TABLET ORAL at 20:15

## 2025-06-13 RX ADMIN — EPHEDRINE SULFATE 10 MG: 50 INJECTION INTRAVENOUS at 08:04

## 2025-06-13 NOTE — ANESTHESIA PROCEDURE NOTES
Airway  Date/Time: 6/13/2025 7:21 AM  Airway not difficult    General Information and Staff    Patient location during procedure: OR  CRNA/CAA: Luis Graff CRNA    Indications and Patient Condition  Indications for airway management: airway protection    Preoxygenated: yes    Mask difficulty assessment: 0 - not attempted    Final Airway Details    Final airway type: endotracheal airway      Successful airway: ETT  Cuffed: yes   Successful intubation technique: direct laryngoscopy  Adjuncts used in placement: intubating stylet  Endotracheal tube insertion site: oral  Blade: Ela  Blade size: 3.5  ETT size (mm): 7.5  Cormack-Lehane Classification: grade III - view of epiglottis only  Placement verified by: chest auscultation and capnometry   Measured from: teeth  ETT/EBT  to teeth (cm): 23  Number of attempts at approach: 1  Assessment: lips, teeth, and gum same as pre-op and atraumatic intubation

## 2025-06-13 NOTE — THERAPY TREATMENT NOTE
Patient Name: Karan Piedra  : 1945    MRN: [id number]                              Today's Date: 2025       Admit Date: 2025    Visit Dx:     ICD-10-CM ICD-9-CM   1. Impaired mobility [Z74.09]  Z74.09 799.89   2. Degeneration of intervertebral disc of lumbosacral region with discogenic back pain  M51.370 722.52   3. Acute midline low back pain without sciatica  M54.50 724.2     Patient Active Problem List   Diagnosis    Elevated prostate specific antigen (PSA)    Prostate cancer    Encounter for consultation    Non-smoker    Essential hypertension    Mycobacterium avium infection    History of adenomatous polyp of colon    Melanoma of cheek    Degeneration of intervertebral disc of lumbosacral region with discogenic back pain    Acute midline low back pain without sciatica    Iron deficiency anemia    Fusion of spine of thoracic region    Fusion of spine, thoracic region     Past Medical History:   Diagnosis Date    Arthritis     Colon polyps     High cholesterol     HL (hearing loss) proably    Hypertension     Low back pain     Melanoma     Mycobacterium avium complex     Prostate CA     PSA elevation     Seasonal allergies      Past Surgical History:   Procedure Laterality Date    COLONOSCOPY  08/10/2016    five 5 mm polyps in the transverse colon,at the hepatic flexure and in the cecum  resected and retrieved    COLONOSCOPY N/A 10/19/2021    Procedure: COLONOSCOPY WITH ANESTHESIA;  Surgeon: Scotty Jorgensen DO;  Location: [facility] ENDOSCOPY;  Service: Gastroenterology;  Laterality: N/A;  pre adenomatous polyp  post polyps  Ozzie Feliz MD    COLONOSCOPY N/A 10/20/2021    Procedure: COLONOSCOPY WITH ANESTHESIA;  Surgeon: Scotty Jorgensen DO;  Location: [facility] ENDOSCOPY;  Service: Gastroenterology;  Laterality: N/A;  pre: hx polyps  post: diverticulosis  Ozzie Feliz MD    COLONOSCOPY N/A 2025    Procedure: COLONOSCOPY WITH ANESTHESIA (examination of colon);   Surgeon: Scotty Jorgensen DO;  Location: [facility] ENDOSCOPY;  Service: Gastroenterology;  Laterality: N/A;  pre op: hx polyps  post op: diverticulosis;   PCP:  Ozzie Feliz MD    ENDOSCOPY N/A 5/30/2025    Procedure: ESOPHAGOGASTRODUODENOSCOPY WITH ANESTHESIA-(examination of esophagus, stomach, top portion of small intestine);  Surgeon: Scotty Jorgensen DO;  Location:  [facility] ENDOSCOPY;  Service: Gastroenterology;  Laterality: N/A;  pre op: anemia  post op: schatski's ring   PCP:  Ozzie Feliz MD    HERNIA REPAIR      abdominal    PROSTATE BIOPSY N/A 8/7/2018    Procedure: PROSTATE ULTRASOUND  URONAV FUSION BIOPSY;  Surgeon: Chemo Tierney MD;  Location: [facility] OR;  Service: Urology    PROSTATE ULTRASOUND BIOPSY  12/2016    negative    PROSTATECTOMY N/A 11/13/2018    Procedure: PROSTATECTOMY LAPAROSCOPIC WITH DAVINCI SI ROBOT WITH PELVIC LYMPH NODE DISSECTION;  Surgeon: Chemo Tierney MD;  Location: [facility] OR;  Service: DaVinci    ROTATOR CUFF REPAIR Bilateral     SKIN LESION EXCISION Right 2/15/2022    Procedure: 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 with superior M-plasty closure.;  Surgeon: Ye Argueta MD;  Location: [facility] OR;  Service: ENT;  Laterality: Right;    TONSILLECTOMY        General Information       Row Name 06/13/25 1450 06/13/25 1300       Physical Therapy Time and Intention    Document Type therapy note (daily note)  -AJ evaluation  s/p PSF T11-12, R back pain  -MS    Mode of Treatment physical therapy  -AJ physical therapy;co-treatment  -MS      Row Name 06/13/25 1450 06/13/25 1300       General Information    Patient Profile Reviewed -- yes  -MS    Prior Level of Function -- independent:;all household mobility;community mobility;ADL's  -MS    Existing Precautions/Restrictions brace worn when out of bed;LSO;spinal  -AJ brace worn when out of bed;LSO;spinal  -MS    Barriers to Rehab -- physical barrier  -MS      Row Name 06/13/25  1300          Living Environment    Current Living Arrangements home  -MS     People in Home spouse  -MS       Row Name 06/13/25 1300          Home Main Entrance    Number of Stairs, Main Entrance three  walk in shower with shower seat  -MS     Stair Railings, Main Entrance railing on right side (ascending)  -MS       Row Name 06/13/25 1300          Stairs Within Home, Primary    Number of Stairs, Within Home, Primary none  -MS       Row Name 06/13/25 1450 06/13/25 1300       Cognition    Orientation Status (Cognition) oriented x 4  -AJ oriented x 4  -MS      Row Name 06/13/25 1450 06/13/25 1300       Safety Issues/Impairments Affecting Functional Mobility    Impairments Affecting Function (Mobility) pain  -AJ pain  -MS              User Key  (r) = Recorded By, (t) = Taken By, (c) = Cosigned By      Initials Name Provider Type    MS Rachel Morton, PT, DPT, NCS Physical Therapist    Ruben Doll PT DPT Physical Therapist                   Mobility       Row Name 06/13/25 1450 06/13/25 1300       Bed Mobility    Comment, (Bed Mobility) in recliner upon arrival  -AJ pt sitting EOB when entering the room  -MS      Row Name 06/13/25 1450 06/13/25 1300       Sit-Stand Transfer    Sit-Stand [place] (Transfers) contact guard  -AJ contact guard;verbal cues;nonverbal cues (demo/gesture)  -MS    Comment, (Sit-Stand Transfer) pt pushed IV pole for support as needed  -AJ --      Row Name 06/13/25 1450 06/13/25 1300       Gait/Stairs (Locomotion)    [place] Level (Gait) contact guard  -AJ contact guard  -MS    Assistive Device (Gait) other (see comments)  pt pushed IV pole for support as needed  -AJ --  HHA  -MS    Patient was able to Ambulate yes  -AJ yes  -MS    Distance in Feet (Gait) 150  x2  -AJ 20  x2  -MS    Comment, (Gait/Stairs) -- wide base of support  -MS              User Key  (r) = Recorded By, (t) = Taken By, (c) = Cosigned By      Initials Name Provider Type    Rachel Hale, PT, DPT, NCS  Physical Therapist    Ruben Doll, PT DPT Physical Therapist                   Obj/Interventions       Row Name 06/13/25 1300          Range of Motion Comprehensive    General Range of Motion bilateral upper extremity ROM WFL;bilateral lower extremity ROM WFL  -MS       Row Name 06/13/25 1300          Strength Comprehensive (MMT)    General Manual Muscle Testing (MMT) Assessment no strength deficits identified  -MS       Row Name 06/13/25 1450          Motor Skills    Motor Skills functional endurance  -AJ     Functional Endurance gait training  -AJ       Row Name 06/13/25 1450 06/13/25 1300       Balance    Balance Assessment sitting static balance;sitting dynamic balance;standing static balance;standing dynamic balance  -AJ sitting static balance;sitting dynamic balance;standing static balance;standing dynamic balance  -MS    Static Sitting Balance independent  -AJ independent  -MS    Dynamic Sitting Balance independent  -AJ independent  -MS    Position, Sitting Balance supported;sitting in chair  -AJ unsupported;sitting edge of bed  -MS    Static Standing Balance contact guard  -AJ contact guard  -MS    Dynamic Standing Balance contact guard  -AJ contact guard  -MS    Position/Device Used, Standing Balance supported;other (see comments)  pt pushed IV pole for support as needed  -AJ supported  HHA  -MS    Balance Interventions sitting;standing;sit to stand;supported;static;dynamic  -AJ --      Row Name 06/13/25 1450 06/13/25 1300       Sensory Assessment (Somatosensory)    Sensory Assessment (Somatosensory) sensation intact  - sensation intact  -MS              User Key  (r) = Recorded By, (t) = Taken By, (c) = Cosigned By      Initials Name Provider Type    Rachel Hale, PT, DPT, NCS Physical Therapist    Ruben Doll, PT DPT Physical Therapist                   Goals/Plan       Row Name 06/13/25 1300          Bed Mobility Goal 1 (PT)    Activity/Assistive Device (Bed Mobility Goal 1, PT) bed  mobility activities, all  -MS     [place] Level/Cues Needed (Bed Mobility Goal 1, PT) independent  -MS     Time Frame (Bed Mobility Goal 1, PT) long term goal (LTG);by discharge  -MS     Progress/Outcomes (Bed Mobility Goal 1, PT) new goal  -MS       Row Name 06/13/25 1300          Transfer Goal 1 (PT)    Activity/Assistive Device (Transfer Goal 1, PT) sit-to-stand/stand-to-sit;bed-to-chair/chair-to-bed  -MS     [place] Level/Cues Needed (Transfer Goal 1, PT) independent  -MS     Time Frame (Transfer Goal 1, PT) long term goal (LTG);by discharge  -MS     Progress/Outcome (Transfer Goal 1, PT) new goal  -MS       Row Name 06/13/25 1300          Gait Training Goal 1 (PT)    Activity/Assistive Device (Gait Training Goal 1, PT) gait (walking locomotion);decrease fall risk;increase endurance/gait distance;improve balance and speed  -MS     [place] Level (Gait Training Goal 1, PT) independent  -MS     Distance (Gait Training Goal 1, PT) 150ft  -MS     Time Frame (Gait Training Goal 1, PT) long term goal (LTG);by discharge  -MS     Progress/Outcome (Gait Training Goal 1, PT) new goal  -MS       Row Name 06/13/25 1300          Stairs Goal 1 (PT)    Activity/Assistive Device (Stairs Goal 1, PT) ascending stairs;descending stairs;using handrail, right;step-to-step  -MS     [place] Level/Cues Needed (Stairs Goal 1, PT) modified independence  -MS     Number of Stairs (Stairs Goal 1, PT) 3  -MS     Time Frame (Stairs Goal 1, PT) long term goal (LTG);by discharge  -MS     Progress/Outcome (Stairs Goal 1, PT) new goal  -MS       Row Name 06/13/25 1300          Problem Specific Goal 1 (PT)    Problem Specific Goal 1 (PT) The patient will independently implement 1 pain management technique to decrease pain in order to improve functional mobility.  -MS     Time Frame (Problem Specific Goal 1, PT) long-term goal (LTG);by discharge  -MS     Progress/Outcome (Problem Specific Goal 1, PT) new goal  -MS       Row Name  06/13/25 1300          Therapy Assessment/Plan (PT)    Planned Therapy Interventions (PT) balance training;bed mobility training;gait training;patient/family education;orthotic fitting/training;strengthening;stair training;transfer training  -MS               User Key  (r) = Recorded By, (t) = Taken By, (c) = Cosigned By      Initials Name Provider Type    MS Rachel Morton R, PT, DPT, NCS Physical Therapist                   Clinical Impression       Row Name 06/13/25 1450 06/13/25 1300       Pain    Pretreatment Pain Rating 4/10  -AJ 6/10  -MS    Posttreatment Pain Rating 4/10  -AJ 5/10  -MS    Pain Location back  -AJ back  -MS    Pain Side/Orientation generalized;lower  -AJ --    Pain Management Interventions exercise or physical activity utilized;activity modification encouraged;nursing notified  -AJ activity modification encouraged;exercise or physical activity utilized  -MS    Response to Pain Interventions no change per patient report;activity participation with tolerable pain  -AJ activity participation with decreased pain  -MS    Pre/Posttreatment Pain Comment feels improved since pain controlled and pain meds  -AJ --      Row Name 06/13/25 1450 06/13/25 1300       Plan of Care Review    Plan of Care Reviewed With patient;family  -AJ patient;spouse  -MS    Progress improving  -AJ improving  -MS    Outcome Evaluation PT treatment complete. Pt in recliner upon arrival and eager to mobilize once again. Pt reports decresaed pain in back and attributes to pain meds and being OOB. Pt was able to stand and ambulate in hallway 1500' with short standing rest break and returned 150' to room with CGA and pushed IV pole for stability. Pt feels improvement overall and was returned to room with family members present. POC ongoing and will continue progressions as tolerated.  -AJ The patient presents alert and oriented x4 sitting EOB attempted to void in urinal. Pt was unsuccessful so he was assisted to the restroom where  he was successful to void. He is typically independent in all functional mobility and ADLs. Today he demonstrates no focal neurological deficits in strength, sensation, or coordination. He was able to ambulate a short distance in the room with a wide base of support. He was fit wiht a LSO and educated on his spinal restrictions and use of LSO. He will benefit from continued PT to work on increasing his activity and further education. Recommend discharge home with assist.  -MS      Row Name 06/13/25 1300          Therapy Assessment/Plan (PT)    Patient/Family Therapy Goals Statement (PT) go home  -MS     Rehab Potential (PT) good  -MS     Criteria for Skilled Interventions Met (PT) yes;meets criteria;skilled treatment is necessary  -MS     Therapy Frequency (PT) 2 times/day  -MS     Predicted Duration of Therapy Intervention (PT) until discharge  -MS       Row Name 06/13/25 1450 06/13/25 1300       Positioning and Restraints    Pre-Treatment Position sitting in chair/recliner  -AJ --    Post Treatment Position chair  -AJ chair  -MS    In Chair notified nsg;sitting;call light within reach;encouraged to call for assist;with family/caregiver  -AJ sitting;call light within reach;encouraged to call for assist;with family/caregiver;with brace;notified nsg  -MS              User Key  (r) = Recorded By, (t) = Taken By, (c) = Cosigned By      Initials Name Provider Type    MS Morton Rachel LUIS F, PT, DPT, NCS Physical Therapist    Ruben Doll, PT DPT Physical Therapist                   Outcome Measures       Row Name 06/13/25 1450 06/13/25 1300       How much help from another person do you currently need...    Turning from your back to your side while in flat bed without using bedrails? 4  -AJ 3  -MS    Moving from lying on back to sitting on the side of a flat bed without bedrails? 4  -AJ 3  -MS    Moving to and from a bed to a chair (including a wheelchair)? 3  -AJ 3  -MS    Standing up from a chair using your arms  (e.g., wheelchair, bedside chair)? 3  -AJ 3  -MS    Climbing 3-5 steps with a railing? 3  -AJ 3  -MS    To walk in hospital room? 3  -AJ 3  -MS    AM-PAC 6 Clicks Score (PT) 20  -AJ 18  -MS    Highest Level of Mobility Goal Walk 10 Steps or More-6  -AJ Walk 10 Steps or More-6  -MS      Row Name 06/13/25 1450 06/13/25 1301       Functional Assessment    Outcome Measure Options AM-PAC 6 Clicks Basic Mobility (PT)  -AJ AM-PAC 6 Clicks Daily Activity (OT)  -EC (r) LB (t) EC (c)      Row Name 06/13/25 1300          Functional Assessment    Outcome Measure Options AM-PAC 6 Clicks Basic Mobility (PT)  -MS               User Key  (r) = Recorded By, (t) = Taken By, (c) = Cosigned By      Initials Name Provider Type    Rachel Hale LUIS F, PT, DPT, NCS Physical Therapist    Miroslava Ro, OTR/L Occupational Therapist    Ruben Doll, PT DPT Physical Therapist    Lorena Silva, OT Student OT Student                                 Physical Therapy Education       Title: PT OT SLP Therapies (Done)       Topic: Physical Therapy (Done)       Point: Mobility training (Done)       Learning Progress Summary            Patient Acceptance, E, VU by  at 6/13/2025 1508    Comment: gait progressions, safety with IV pole    Acceptance, E, VU by MS at 6/13/2025 1350    Comment: role of PT in his care, spinal restrctions and use of LSO                      Point: Home exercise program (Done)       Learning Progress Summary            Patient Acceptance, E, VU by  at 6/13/2025 1508    Comment: gait progressions, safety with IV pole                      Point: Body mechanics (Done)       Learning Progress Summary            Patient Acceptance, E, VU by  at 6/13/2025 1508    Comment: gait progressions, safety with IV pole                      Point: Precautions (Done)       Learning Progress Summary            Patient Acceptance, E, VU by  at 6/13/2025 1508    Comment: gait progressions, safety with IV pole    Acceptance, E,  VU by MS at 6/13/2025 1350    Comment: role of PT in his care, spinal restrctions and use of LSO                                      User Key       Initials Effective Dates Name Provider Type Discipline    MS 07/11/23 -  Rachel Morton, PT, DPT, NCS Physical Therapist PT    KOREY 08/15/24 -  Ruben Bailey PT DPT Physical Therapist PT                  PT Recommendation and Plan     Progress: improving  Outcome Evaluation: PT treatment complete. Pt in recliner upon arrival and eager to mobilize once again. Pt reports decresaed pain in back and attributes to pain meds and being OOB. Pt was able to stand and ambulate in hallway 1500' with short standing rest break and returned 150' to room with CGA and pushed IV pole for stability. Pt feels improvement overall and was returned to room with family members present. POC ongoing and will continue progressions as tolerated.     Time Calculation:         PT Charges       Row Name 06/13/25 1450 06/13/25 1300          Time Calculation    Start Time 1450  -AJ 1300  -MS     Stop Time 1505  -AJ 1340  -MS     Time Calculation (min) 15 min  -AJ 40 min  -MS     PT Received On -- 06/13/25  -MS     PT Goal Re-Cert Due Date -- 06/23/25  -MS        Timed Charges    57807 - Gait Training Minutes  15  -AJ --        Untimed Charges    PT Eval/Re-eval Minutes -- 40  -MS        Total Minutes    Timed Charges Total Minutes 15  -AJ --     Untimed Charges Total Minutes -- 40  -MS      Total Minutes 15  -AJ 40  -MS               User Key  (r) = Recorded By, (t) = Taken By, (c) = Cosigned By      Initials Name Provider Type    MS Praveen Rachel KERNS, PT, DPT, NCS Physical Therapist    AJ Ruben Bailey PT DPT Physical Therapist                  Therapy Charges for Today       Code Description Service Date Service Provider Modifiers Qty    [id number] HC GAIT TRAINING EA 15 MIN 6/13/2025 Ruben Bailey PT DPT GP 1            PT G-Codes  Outcome Measure Options: AM-PAC 6 Clicks Basic Mobility  (PT)  AM-PAC 6 Clicks Score (PT): 20  AM-PAC 6 Clicks Score (OT): 18       Ruben Bailey PT DPT  6/13/2025

## 2025-06-13 NOTE — THERAPY EVALUATION
Patient Name: Karan Piedra  : 1945    MRN: [id number]                              Today's Date: 2025       Admit Date: 2025    Visit Dx:     ICD-10-CM ICD-9-CM   1. Impaired mobility [Z74.09]  Z74.09 799.89   2. Degeneration of intervertebral disc of lumbosacral region with discogenic back pain  M51.370 722.52   3. Acute midline low back pain without sciatica  M54.50 724.2     Patient Active Problem List   Diagnosis    Elevated prostate specific antigen (PSA)    Prostate cancer    Encounter for consultation    Non-smoker    Essential hypertension    Mycobacterium avium infection    History of adenomatous polyp of colon    Melanoma of cheek    Degeneration of intervertebral disc of lumbosacral region with discogenic back pain    Acute midline low back pain without sciatica    Iron deficiency anemia    Fusion of spine of thoracic region    Fusion of spine, thoracic region     Past Medical History:   Diagnosis Date    Arthritis     Colon polyps     High cholesterol     HL (hearing loss) proably    Hypertension     Low back pain     Melanoma     Mycobacterium avium complex     Prostate CA     PSA elevation     Seasonal allergies      Past Surgical History:   Procedure Laterality Date    COLONOSCOPY  08/10/2016    five 5 mm polyps in the transverse colon,at the hepatic flexure and in the cecum  resected and retrieved    COLONOSCOPY N/A 10/19/2021    Procedure: COLONOSCOPY WITH ANESTHESIA;  Surgeon: Scotty Jorgensen DO;  Location: [facility] ENDOSCOPY;  Service: Gastroenterology;  Laterality: N/A;  pre adenomatous polyp  post polyps  Ozzie Feliz MD    COLONOSCOPY N/A 10/20/2021    Procedure: COLONOSCOPY WITH ANESTHESIA;  Surgeon: Scotty Jorgensen DO;  Location: [facility] ENDOSCOPY;  Service: Gastroenterology;  Laterality: N/A;  pre: hx polyps  post: diverticulosis  Ozzie Feliz MD    COLONOSCOPY N/A 2025    Procedure: COLONOSCOPY WITH ANESTHESIA (examination of colon);   Surgeon: Scotty Jorgensen DO;  Location: [facility] ENDOSCOPY;  Service: Gastroenterology;  Laterality: N/A;  pre op: hx polyps  post op: diverticulosis;   PCP:  Ozzie Feliz MD    ENDOSCOPY N/A 5/30/2025    Procedure: ESOPHAGOGASTRODUODENOSCOPY WITH ANESTHESIA-(examination of esophagus, stomach, top portion of small intestine);  Surgeon: Scotty Jorgensen DO;  Location:  [facility] ENDOSCOPY;  Service: Gastroenterology;  Laterality: N/A;  pre op: anemia  post op: schatski's ring   PCP:  Ozzie Feliz MD    HERNIA REPAIR      abdominal    PROSTATE BIOPSY N/A 8/7/2018    Procedure: PROSTATE ULTRASOUND  URONAV FUSION BIOPSY;  Surgeon: Chemo Tierney MD;  Location: [facility] OR;  Service: Urology    PROSTATE ULTRASOUND BIOPSY  12/2016    negative    PROSTATECTOMY N/A 11/13/2018    Procedure: PROSTATECTOMY LAPAROSCOPIC WITH DAVINCI SI ROBOT WITH PELVIC LYMPH NODE DISSECTION;  Surgeon: Chemo Tierney MD;  Location: [facility] OR;  Service: DaVinci    ROTATOR CUFF REPAIR Bilateral     SKIN LESION EXCISION Right 2/15/2022    Procedure: 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 with superior M-plasty closure.;  Surgeon: Ye Argueta MD;  Location: [facility] OR;  Service: ENT;  Laterality: Right;    TONSILLECTOMY        General Information       Row Name 06/13/25 1300          Physical Therapy Time and Intention    Document Type evaluation  s/p PSF T11-12, R back pain  -MS     Mode of Treatment physical therapy;co-treatment  -MS       Row Name 06/13/25 1300          General Information    Patient Profile Reviewed yes  -MS     Prior Level of Function independent:;all household mobility;community mobility;ADL's  -MS     Existing Precautions/Restrictions brace worn when out of bed;LSO;spinal  -MS     Barriers to Rehab physical barrier  -MS       Row Name 06/13/25 1300          Living Environment    Current Living Arrangements home  -MS     People in Home spouse  -MS       Row Name  06/13/25 1300          Home Main Entrance    Number of Stairs, Main Entrance three  walk in shower with shower seat  -MS     Stair Railings, Main Entrance railing on right side (ascending)  -MS       Row Name 06/13/25 1300          Stairs Within Home, Primary    Number of Stairs, Within Home, Primary none  -MS       Row Name 06/13/25 1300          Cognition    Orientation Status (Cognition) oriented x 4  -MS       Row Name 06/13/25 1300          Safety Issues/Impairments Affecting Functional Mobility    Impairments Affecting Function (Mobility) pain  -MS               User Key  (r) = Recorded By, (t) = Taken By, (c) = Cosigned By      Initials Name Provider Type    MS Rachel Morton, PT, DPT, NCS Physical Therapist                   Mobility       Row Name 06/13/25 1300          Bed Mobility    Comment, (Bed Mobility) pt sitting EOB when entering the room  -MS       [facility] Name 06/13/25 1300          Sit-Stand Transfer    Sit-Stand [place] (Transfers) contact guard;verbal cues;nonverbal cues (demo/gesture)  -MS       [facility] Name 06/13/25 1300          Gait/Stairs (Locomotion)    [place] Level (Gait) contact guard  -MS     Assistive Device (Gait) --  HHA  -MS     Patient was able to Ambulate yes  -MS     Distance in Feet (Gait) 20  x2  -MS     Comment, (Gait/Stairs) wide base of support  -MS               User Key  (r) = Recorded By, (t) = Taken By, (c) = Cosigned By      Initials Name Provider Type    MS Morton Rachel KERNS, PT, DPT, NCS Physical Therapist                   Obj/Interventions       Row Name 06/13/25 1300          Range of Motion Comprehensive    General Range of Motion bilateral upper extremity ROM WFL;bilateral lower extremity ROM WFL  -MS       Row Name 06/13/25 1300          Strength Comprehensive (MMT)    General Manual Muscle Testing (MMT) Assessment no strength deficits identified  -MS       [facility] Name 06/13/25 1300          Balance    Balance Assessment sitting static balance;sitting dynamic  balance;standing static balance;standing dynamic balance  -MS     Static Sitting Balance independent  -MS     Dynamic Sitting Balance independent  -MS     Position, Sitting Balance unsupported;sitting edge of bed  -MS     Static Standing Balance contact guard  -MS     Dynamic Standing Balance contact guard  -MS     Position/Device Used, Standing Balance supported  HHA  -MS       Row Name 06/13/25 1300          Sensory Assessment (Somatosensory)    Sensory Assessment (Somatosensory) sensation intact  -MS               User Key  (r) = Recorded By, (t) = Taken By, (c) = Cosigned By      Initials Name Provider Type    Rachel Hale R, PT, DPT, NCS Physical Therapist                   Goals/Plan       Row Name 06/13/25 1300          Bed Mobility Goal 1 (PT)    Activity/Assistive Device (Bed Mobility Goal 1, PT) bed mobility activities, all  -MS     [place] Level/Cues Needed (Bed Mobility Goal 1, PT) independent  -MS     Time Frame (Bed Mobility Goal 1, PT) long term goal (LTG);by discharge  -MS     Progress/Outcomes (Bed Mobility Goal 1, PT) new goal  -MS       Row Name 06/13/25 1300          Transfer Goal 1 (PT)    Activity/Assistive Device (Transfer Goal 1, PT) sit-to-stand/stand-to-sit;bed-to-chair/chair-to-bed  -MS     [place] Level/Cues Needed (Transfer Goal 1, PT) independent  -MS     Time Frame (Transfer Goal 1, PT) long term goal (LTG);by discharge  -MS     Progress/Outcome (Transfer Goal 1, PT) new goal  -MS       Row Name 06/13/25 1300          Gait Training Goal 1 (PT)    Activity/Assistive Device (Gait Training Goal 1, PT) gait (walking locomotion);decrease fall risk;increase endurance/gait distance;improve balance and speed  -MS     [place] Level (Gait Training Goal 1, PT) independent  -MS     Distance (Gait Training Goal 1, PT) 150ft  -MS     Time Frame (Gait Training Goal 1, PT) long term goal (LTG);by discharge  -MS     Progress/Outcome (Gait Training Goal 1, PT) new goal  -[facility]  Name 06/13/25 1300          Stairs Goal 1 (PT)    Activity/Assistive Device (Stairs Goal 1, PT) ascending stairs;descending stairs;using handrail, right;step-to-step  -MS     [place] Level/Cues Needed (Stairs Goal 1, PT) modified independence  -MS     Number of Stairs (Stairs Goal 1, PT) 3  -MS     Time Frame (Stairs Goal 1, PT) long term goal (LTG);by discharge  -MS     Progress/Outcome (Stairs Goal 1, PT) new goal  -MS       Row Name 06/13/25 1300          Problem Specific Goal 1 (PT)    Problem Specific Goal 1 (PT) The patient will independently implement 1 pain management technique to decrease pain in order to improve functional mobility.  -MS     Time Frame (Problem Specific Goal 1, PT) long-term goal (LTG);by discharge  -MS     Progress/Outcome (Problem Specific Goal 1, PT) new goal  -MS       Row Name 06/13/25 1300          Therapy Assessment/Plan (PT)    Planned Therapy Interventions (PT) balance training;bed mobility training;gait training;patient/family education;orthotic fitting/training;strengthening;stair training;transfer training  -MS               User Key  (r) = Recorded By, (t) = Taken By, (c) = Cosigned By      Initials Name Provider Type    Rachel Hale, PT, DPT, NCS Physical Therapist                   Clinical Impression       Row Name 06/13/25 1300          Pain    Pretreatment Pain Rating 6/10  -MS     Posttreatment Pain Rating 5/10  -MS     Pain Location back  -MS     Pain Management Interventions activity modification encouraged;exercise or physical activity utilized  -MS     Response to Pain Interventions activity participation with decreased pain  -MS       Row Name 06/13/25 1300          Plan of Care Review    Plan of Care Reviewed With patient;spouse  -MS     Progress improving  -MS     Outcome Evaluation The patient presents alert and oriented x4 sitting EOB attempted to void in urinal. Pt was unsuccessful so he was assisted to the restroom where he was successful to void.  He is typically independent in all functional mobility and ADLs. Today he demonstrates no focal neurological deficits in strength, sensation, or coordination. He was able to ambulate a short distance in the room with a wide base of support. He was fit wiht a LSO and educated on his spinal restrictions and use of LSO. He will benefit from continued PT to work on increasing his activity and further education. Recommend discharge home with assist.  -MS       Row Name 06/13/25 1300          Therapy Assessment/Plan (PT)    Patient/Family Therapy Goals Statement (PT) go home  -MS     Rehab Potential (PT) good  -MS     Criteria for Skilled Interventions Met (PT) yes;meets criteria;skilled treatment is necessary  -MS     Therapy Frequency (PT) 2 times/day  -MS     Predicted Duration of Therapy Intervention (PT) until discharge  -MS       Row Name 06/13/25 1300          Positioning and Restraints    Post Treatment Position chair  -MS     In Chair sitting;call light within reach;encouraged to call for assist;with family/caregiver;with brace;notified nsg  -MS               User Key  (r) = Recorded By, (t) = Taken By, (c) = Cosigned By      Initials Name Provider Type    MS Praveen Rachel R, PT, DPT, NCS Physical Therapist                   Outcome Measures       Row Name 06/13/25 1300          How much help from another person do you currently need...    Turning from your back to your side while in flat bed without using bedrails? 3  -MS     Moving from lying on back to sitting on the side of a flat bed without bedrails? 3  -MS     Moving to and from a bed to a chair (including a wheelchair)? 3  -MS     Standing up from a chair using your arms (e.g., wheelchair, bedside chair)? 3  -MS     Climbing 3-5 steps with a railing? 3  -MS     To walk in hospital room? 3  -MS     AM-PAC 6 Clicks Score (PT) 18  -MS     Highest Level of Mobility Goal Walk 10 Steps or More-6  -MS       Row Name 06/13/25 1300          Functional Assessment     Outcome Measure Options AM-PAC 6 Clicks Basic Mobility (PT)  -MS               User Key  (r) = Recorded By, (t) = Taken By, (c) = Cosigned By      Initials Name Provider Type    MS Rachel Morton, PT, DPT, NCS Physical Therapist                                 Physical Therapy Education       Title: PT OT SLP Therapies (In Progress)       Topic: Physical Therapy (In Progress)       Point: Mobility training (Done)       Learning Progress Summary            Patient Acceptance, E, VU by MS at 6/13/2025 1350    Comment: role of PT in his care, spinal restrctions and use of LSO                      Point: Home exercise program (Not Started)       Learner Progress:  Not documented in this visit.              Point: Body mechanics (Not Started)       Learner Progress:  Not documented in this visit.              Point: Precautions (Done)       Learning Progress Summary            Patient Acceptance, E, VU by MS at 6/13/2025 1350    Comment: role of PT in his care, spinal restrctions and use of LSO                                      User Key       Initials Effective Dates Name Provider Type Discipline    MS 07/11/23 -  Rachel Morton PT, DPT, NCS Physical Therapist PT                  PT Recommendation and Plan  Planned Therapy Interventions (PT): balance training, bed mobility training, gait training, patient/family education, orthotic fitting/training, strengthening, stair training, transfer training  Progress: improving  Outcome Evaluation: The patient presents alert and oriented x4 sitting EOB attempted to void in urinal. Pt was unsuccessful so he was assisted to the restroom where he was successful to void. He is typically independent in all functional mobility and ADLs. Today he demonstrates no focal neurological deficits in strength, sensation, or coordination. He was able to ambulate a short distance in the room with a wide base of support. He was fit wiht a LSO and educated on his spinal restrictions and  use of LSO. He will benefit from continued PT to work on increasing his activity and further education. Recommend discharge home with assist.     Time Calculation:         PT Charges       Row Name 06/13/25 1300             Time Calculation    Start Time 1300  -MS      Stop Time 1340  -MS      Time Calculation (min) 40 min  -MS      PT Received On 06/13/25  -MS      PT Goal Re-Cert Due Date 06/23/25  -MS         Untimed Charges    PT Eval/Re-eval Minutes 40  -MS         Total Minutes    Untimed Charges Total Minutes 40  -MS       Total Minutes 40  -MS                User Key  (r) = Recorded By, (t) = Taken By, (c) = Cosigned By      Initials Name Provider Type    Rachel Hale, PT, DPT, NCS Physical Therapist                      PT G-Codes  Outcome Measure Options: AM-PAC 6 Clicks Basic Mobility (PT)  AM-PAC 6 Clicks Score (PT): 18  PT Discharge Summary  Anticipated Discharge Disposition (PT): home with assist    Rachel Morton, PT, DPT, NCS  6/13/2025

## 2025-06-13 NOTE — PLAN OF CARE
Goal Outcome Evaluation:  Plan of Care Reviewed With: patient, spouse        Progress: improving  Outcome Evaluation: OT eval completed on this date. Pt. presents sitting EOB attempting to urinate upon OT entry and requested privacy. Pt. was unable to urniate sitting EOB and expressed desire to sit on the commode in BR. Mr. Piedra was educated on spinal precautions and the use of LSO brace, and rated his pain a 6/10.. Pt. completed sit<>stand with [place] x2 and walked to the BR with CGA x1. Pt. used grab bars to sit down safely on commode. Pt. stood CGAx1 from commode and walked to the chair, sitting down with CGA x1 for safety. Pt. stated that he felt much better while walking, with decreased pain rating of 5/10. Pt. was A&Ox4, alert and willing to participate. Pt. demonstrated ROM WNL and MMT 5/5 on B UE while sitting in chair. Mr. Piedra was left sitting up in the chair, spouse in the room, call light nearby, and encouraged to call nsg if needed. Pt. would benefit from continued skilled OT intervention to address fxl mobility, fxl ax fabrice, and pain management strategies. Recommend d/c to home with assist.    Anticipated Discharge Disposition (OT): home with assist

## 2025-06-13 NOTE — PLAN OF CARE
Goal Outcome Evaluation:  Plan of Care Reviewed With: patient, family        Progress: improving  Outcome Evaluation: PT treatment complete. Pt in recliner upon arrival and eager to mobilize once again. Pt reports decresaed pain in back and attributes to pain meds and being OOB. Pt was able to stand and ambulate in hallway 1500' with short standing rest break and returned 150' to room with CGA and pushed IV pole for stability. Pt feels improvement overall and was returned to room with family members present. POC ongoing and will continue progressions as tolerated.

## 2025-06-13 NOTE — PLAN OF CARE
Goal Outcome Evaluation:     Patient A&Ox4, VSS, RA, patient up with standby assist with TLSO brace Patient walked in zambrano with patient and tolerated well. Medicated for pain as needed. IV fluids infusing as ordered. Voids per urinal. Wife at bedside. All safety maintained and call light in reach.

## 2025-06-13 NOTE — ANESTHESIA POSTPROCEDURE EVALUATION
Patient: Karan Piedra    Procedure Summary       Date: 06/13/25 Room / Location:  [facility] OR [address] [place] [address] [facility] OR    Anesthesia Start: 0717 Anesthesia Stop: 0938    Procedure: THORACIC RIGHT H;JAYCE LAMINECTOMY, FACETECTOMY POSTERIOR FUSION WITH INSTRUMENTATION T11-12 (Right: Spine Lumbar) Diagnosis:       Degeneration of intervertebral disc of lumbosacral region with discogenic back pain      Acute midline low back pain without sciatica      (Degeneration of intervertebral disc of lumbosacral region with discogenic back pain [M51.370])      (Acute midline low back pain without sciatica [M54.50])    Surgeons: Mahendra Santana MD Provider: Luis Graff CRNA    Anesthesia Type: general ASA Status: 2            Anesthesia Type: general    Vitals  Vitals Value Taken Time   /59 06/13/25 10:36   Temp 97.1 °F (36.2 °C) 06/13/25 10:22   Pulse 57 06/13/25 10:36   Resp 12 06/13/25 10:22   SpO2 100 % 06/13/25 10:36   Vitals shown include unfiled device data.        Post Anesthesia Care and Evaluation    Patient location during evaluation: PACU  Patient participation: complete - patient participated  Level of consciousness: awake and awake and alert  Pain score: 0  Pain management: adequate    Airway patency: patent  Anesthetic complications: No anesthetic complications  PONV Status: none  Cardiovascular status: acceptable  Respiratory status: acceptable  Hydration status: acceptable    Comments: Patient discharged according to acceptable Cresencio score per RN assessment. See nursing records for further information.     Blood pressure 110/52, pulse 56, temperature 97.1 °F (36.2 °C), resp. rate 12, weight 89.7 kg (197 lb 12 oz), SpO2 98%.

## 2025-06-13 NOTE — OP NOTE
Procedure Note    Pre-op Diagnosis: Degeneration of intervertebral disc of lumbosacral region with discogenic back pain [M51.370]  Acute midline low back pain without sciatica [M54.50]    Post-Op Diagnosis: Post-Op Diagnosis Codes:     * Degeneration of intervertebral disc of lumbosacral region with discogenic back pain [M51.370]     * Acute midline low back pain without sciatica [M54.50]     Procedure Name: T11-12 right hemilaminectomy, facetectomy, discectomy  T11, 12 posterior pedicle screw if mentation  Use of image-guided stereotactic navigation  Use of the surgical robot    Spinal Surgery Levels Completed:1 Level    Indications:  A MRI revealed findings of thoracic stenosis. The patient now presents for a  T11-12 decompression and fusion after discussing therapeutic alternatives.          Surgeon: Mahendra Santana MD     Assistants: none    Anesthesia: General endotracheal anesthesia    ASA Class: 3    Procedure Details   After obtaining informed consent, having the risks and benefits of the procedure explained including but not limited to infection, bleeding, paralysis, spinal fluid leak, bowel or bladder incontinence, stroke, coma, and death, the patient was brought to the operating room.  The patient was given general anesthesia via an endotracheal tube.  The patient was flipped prone on a Ganga axis table.  The lumbar spine was then prepped and draped in a standard sterile fashion.  The posterior superior iliac spine on the right was palpated and identified.  A preplanned incision was infiltrated with Marcaine and epinephrine.  A 10 blade scalpel was used to make an incision at the dermis and epidermis.  Bovie cautery was used to extend the incision down to the level of the periosteum at the posterior superior iliac spine on the right.  A pin was then inserted using a pin .  The surgical robot was then anchored to the iliac spine pin.  The surgical robot was then registered and calibrated.   Preoperative CT scan and fluoroscopic images were then aligned and registered.  In accordance with the predesign plan the robot arm was then sent to the left at T11 and T12.  A 20 blade scalpel was used to make an incision through the lumbosacral fascia along this trajectory.  A tissue protector and dilator were then inserted through the robot arm to the entry point at T11 and T12 .  A drill was then inserted through the robot arm along this trajectory and a channel was drilled through the pedicle at T11 and T12   on the left .  A tap was then inserted through the robot arm along its trajectory and each pedicle was then tapped.  A series of image-guided screws were then placed through the robot arm along the trajectory at T11 and T12 through the pedicle into the vertebral body on the left .  The robot arm was then sent to the right at T11 and T12 along the preplanned trajectory. A 20 blade scalpel was used to make an incision through the dermis and epidermis along this trajectory.  A tissue protector and dilator were then inserted through the robot arm to the entry point at T11 and T12 .  A drill was then inserted through the robot arm along this trajectory and a channel was drilled through the pedicle at T11 and T12 .  A tap was then inserted through the robot arm along its trajectory and each pedicle was then tapped.  A series of image-guided screws were then placed through the robot arm along the trajectory at T11 and T12 through the pedicle into the vertebral body on the right .   The retractor system was then deployed over the screw extenders on the right at T11 and T12.  An image-guided drill was then used to drill hemilaminectomy and facetectomy on the right.  The hemilaminectomy and facetectomy were completed using a series of 2 mm and 3 mm Kerrisons.  .  The annulus of the disc base was then incised using a bayoneted 15 blade scalpel.  A partial discectomy was then done using pituitary rongeur and a series  of 2 mm and 3 mm Kerrisons.  T a far lateral portion of the foraminotomies were completed using a series of Leksell rongeurs, 2 mm Kerrisons, 3 mm Kerrisons.  2 custom fabricated rods were then inserted in the head of the screws on the left and the right.  The rods were then anchored in place and reduced using set screws.   Final AP and lateral fluoroscopy showed good positioning of all interbody devices and hardware.  The set screws were final tightened and broken off.  The wounds were then copiously irrigated with antibiotic solution.  They were inspected for hemostasis.   The deep tissues were closed using a series of inverted interrupted 0 Vicryl sutures.  The subcutaneous and soft tissues were closed using a series of inverted 2-0 Vicryl sutures.  And the skin was closed using a running 4-0 Monocryl.  All sponge, needle and instrument counts were correct at the end of the procedure.  The patient was extubated in stable condition and returned to the recovery room with about 50 mL of blood loss.       Findings:  Thoracic stenosis.    Estimated Blood Loss:  50           Drains: None           Total IV Fluids:  mL           Specimens: None           Implants:   Implant Name Type Inv. Item Serial No.  Lot No. LRB No. Used Action   KT HEMOST ABS SURGIFOAM PORCN 1GRAM - [id number] Implant KT HEMOST ABS SURGIFOAM PORCN 1GRAM  [employer]  [employer] J [employer] J [id number] Right 1 Implanted   SCRW LINDY SOLERA VOYAGER MAS 6.5X40MM - [id number] Implant SCRW LINDY SOLERA VOYAGER MAS 6.5X40MM  [employer]  Right 4 Implanted   CP BUNDLE UNID EXP SVETLANA 1TO3/LVL NATL CUST - [id number] Implant CP BUNDLE UNID EXP SVETLANA 1TO3/LVL NATL CUST  [employer]  Right 1 Implanted   SVETLANA SPINE CUST PERC W/PLAN/GUID 1TO3/LVL COCR 4.75MM - [id number] Implant SVETLANA SPINE CUST PERC W/PLAN/GUID 1TO3/LVL COCR 4.75MM  [employer]  Right 2 Implanted   SCRW ST SOLERA VOYAGER BRKOFF TI 4.75MM - [id number] Implant SCRW ST SOLERA VOYAGER BRKOFF TI 4.75MM  [employer]   Right 4 Implanted              Complications:  none           Disposition: PACU - hemodynamically stable.           Condition: stable        Mahendra Santana MD

## 2025-06-13 NOTE — PLAN OF CARE
Goal Outcome Evaluation:  Plan of Care Reviewed With: patient, spouse        Progress: improving  Outcome Evaluation: The patient presents alert and oriented x4 sitting EOB attempted to void in urinal. Pt was unsuccessful so he was assisted to the restroom where he was successful to void. He is typically independent in all functional mobility and ADLs. Today he demonstrates no focal neurological deficits in strength, sensation, or coordination. He was able to ambulate a short distance in the room with a wide base of support. He was fit wiht a LSO and educated on his spinal restrictions and use of LSO. He will benefit from continued PT to work on increasing his activity and further education. Recommend discharge home with assist.    Anticipated Discharge Disposition (PT): home with assist

## 2025-06-13 NOTE — THERAPY EVALUATION
Patient Name: Karan Piedra  : 1945    MRN: [id number]                              Today's Date: 2025       Admit Date: 2025    Visit Dx:     ICD-10-CM ICD-9-CM   1. Impaired mobility [Z74.09]  Z74.09 799.89   2. Degeneration of intervertebral disc of lumbosacral region with discogenic back pain  M51.370 722.52   3. Acute midline low back pain without sciatica  M54.50 724.2     Patient Active Problem List   Diagnosis    Elevated prostate specific antigen (PSA)    Prostate cancer    Encounter for consultation    Non-smoker    Essential hypertension    Mycobacterium avium infection    History of adenomatous polyp of colon    Melanoma of cheek    Degeneration of intervertebral disc of lumbosacral region with discogenic back pain    Acute midline low back pain without sciatica    Iron deficiency anemia    Fusion of spine of thoracic region    Fusion of spine, thoracic region     Past Medical History:   Diagnosis Date    Arthritis     Colon polyps     High cholesterol     HL (hearing loss) proably    Hypertension     Low back pain     Melanoma     Mycobacterium avium complex     Prostate CA     PSA elevation     Seasonal allergies      Past Surgical History:   Procedure Laterality Date    COLONOSCOPY  08/10/2016    five 5 mm polyps in the transverse colon,at the hepatic flexure and in the cecum  resected and retrieved    COLONOSCOPY N/A 10/19/2021    Procedure: COLONOSCOPY WITH ANESTHESIA;  Surgeon: Scotty Jorgensen DO;  Location: [facility] ENDOSCOPY;  Service: Gastroenterology;  Laterality: N/A;  pre adenomatous polyp  post polyps  Ozzie Feliz MD    COLONOSCOPY N/A 10/20/2021    Procedure: COLONOSCOPY WITH ANESTHESIA;  Surgeon: Scotty Jorgensen DO;  Location: [facility] ENDOSCOPY;  Service: Gastroenterology;  Laterality: N/A;  pre: hx polyps  post: diverticulosis  Ozzie Feliz MD    COLONOSCOPY N/A 2025    Procedure: COLONOSCOPY WITH ANESTHESIA (examination of colon);   Surgeon: Scotty Jorgensen DO;  Location:  [facility] ENDOSCOPY;  Service: Gastroenterology;  Laterality: N/A;  pre op: hx polyps  post op: diverticulosis;   PCP:  Ozzie Feliz MD    ENDOSCOPY N/A 5/30/2025    Procedure: ESOPHAGOGASTRODUODENOSCOPY WITH ANESTHESIA-(examination of esophagus, stomach, top portion of small intestine);  Surgeon: Scotty Jorgensen DO;  Location:  [facility] ENDOSCOPY;  Service: Gastroenterology;  Laterality: N/A;  pre op: anemia  post op: schatski's ring   PCP:  Ozzie Feliz MD    HERNIA REPAIR      abdominal    PROSTATE BIOPSY N/A 8/7/2018    Procedure: PROSTATE ULTRASOUND  URONAV FUSION BIOPSY;  Surgeon: Chemo Tierney MD;  Location: [facility] OR;  Service: Urology    PROSTATE ULTRASOUND BIOPSY  12/2016    negative    PROSTATECTOMY N/A 11/13/2018    Procedure: PROSTATECTOMY LAPAROSCOPIC WITH DAVINCI SI ROBOT WITH PELVIC LYMPH NODE DISSECTION;  Surgeon: Chemo Tierney MD;  Location: [facility] OR;  Service: DaVinci    ROTATOR CUFF REPAIR Bilateral     SKIN LESION EXCISION Right 2/15/2022    Procedure: 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 with superior M-plasty closure.;  Surgeon: Ye Argueta MD;  Location: [facility] OR;  Service: ENT;  Laterality: Right;    TONSILLECTOMY        General Information       Row Name 06/13/25 1301 06/13/25 1107       OT Time and Intention    Subjective Information no complaints  -EC (r) LB (t) EC (c) --    Document Type evaluation  Pt. presented to  for T11-12 decompression and fusion. Post-op dx includes: degeneration of intervertebral disc of lumbosacral region with discogenic back pain and cute midline low back pain without sciatica.  -EC (r) LB (t) EC (c) --  -EC (r) LB (t) EC (c)    Mode of Treatment occupational therapy  -EC (r) LB (t) EC (c) --  -EC (r) LB (t) EC (c)    Patient Effort good  -EC (r) LB (t) EC (c) --    Symptoms Noted During/After Treatment none  -EC (r) LB (t) EC (c) --      Row Name  06/13/25 1301 06/13/25 1107       General Information    Patient Profile Reviewed yes  -EC (r) LB (t) EC (c) --  -EC (r) LB (t) EC (c)    Prior Level of Function independent:;all household mobility;community mobility;ADL's  -EC (r) LB (t) EC (c) --    Existing Precautions/Restrictions brace worn when out of bed;LSO;spinal  -EC (r) LB (t) EC (c) --  -EC (r) LB (t) EC (c)    Barriers to Rehab physical barrier  -EC (r) LB (t) EC (c) --  -EC (r) LB (t) EC (c)      Row Name 06/13/25 1301          Occupational Profile    Environmental Supports and Barriers (Occupational Profile) lives with spouse, PLOF= I  -EC (r) LB (t) EC (c)       Row Name 06/13/25 1301          Living Environment    Current Living Arrangements home  -EC (r) LB (t) EC (c)     People in Home spouse  -EC (r) LB (t) EC (c)       Row Name 06/13/25 1301          Home Main Entrance    Number of Stairs, Main Entrance three  -EC (r) LB (t) EC (c)     Stair Railings, Main Entrance railing on right side (ascending)  -EC (r) LB (t) EC (c)       Row Name 06/13/25 1301          Stairs Within Home, Primary    Number of Stairs, Within Home, Primary none  -EC (r) LB (t) EC (c)     Stair Railings, Within Home, Primary none  -EC (r) LB (t) EC (c)       Row Name 06/13/25 1301 06/13/25 1107       Cognition    Orientation Status (Cognition) oriented x 4  -EC (r) LB (t) EC (c) --  -EC (r) LB (t) EC (c)      Row Name 06/13/25 1301 06/13/25 1107       Safety Issues/Impairments Affecting Functional Mobility    Safety Issues Affecting Function (Mobility) safety precaution awareness;safety precautions follow-through/compliance  -EC (r) LB (t) EC (c) --    Impairments Affecting Function (Mobility) pain  -EC (r) LB (t) EC (c) --  -EC (r) LB (t) EC (c)              User Key  (r) = Recorded By, (t) = Taken By, (c) = Cosigned By      Initials Name Provider Type    EC Miroslava Parham, OTR/L Occupational Therapist    Lorena Silva, OT Student OT Student                      Mobility/ADL's       Row Name 06/13/25 1301          Bed Mobility    Comment, (Bed Mobility) Pt. sitting EOB when entering the room  -EC (r) LB (t) EC (c)       Row Name 06/13/25 1301          Transfers    Transfers sit-stand transfer;stand-sit transfer  -EC (r) LB (t) EC (c)       Row Name 06/13/25 130          Sit-Stand Transfer    Sit-Stand [place] (Transfers) contact guard;verbal cues;nonverbal cues (demo/gesture)  -EC (r) LB (t) EC (c)       Row Name 06/13/25 1301          Stand-Sit Transfer    Stand-Sit [place] (Transfers) contact guard;1 person assist  -EC (r) LB (t) EC (c)       Row Name 06/13/25 1301          Functional Mobility    Functional Mobility- Ind. Level contact guard assist;1 person  -EC (r) LB (t) EC (c)     Functional Mobility- Comment pt. walked bed <> BR <> chair  -EC (r) LB (t) EC (c)     Patient was able to Ambulate yes  -EC (r) LB (t) EC (c)       Row Name 06/13/25 1301          Activities of Daily Living    BADL Assessment/Intervention toileting  -EC (r) LB (t) EC (c)       Row Name 06/13/25 1301          Toileting Assessment/Training    [place] Level (Toileting) toileting skills;independent  -EC (r) LB (t) EC (c)     Assistive Devices (Toileting) commode;grab bar/safety frame  -EC (r) LB (t) EC (c)     Position (Toileting) unsupported sitting  sitting commode  -EC (r) LB (t) EC (c)               User Key  (r) = Recorded By, (t) = Taken By, (c) = Cosigned By      Initials Name Provider Type    EC Miroslava Parham, OTR/L Occupational Therapist    LB Lorena Jefferson OT Student OT Student                   Obj/Interventions       Row Name 06/13/25 1301          Sensory Assessment (Somatosensory)    Sensory Assessment (Somatosensory) UE sensation intact  -EC (r) LB (t) EC (c)       Row Name 06/13/25 1301          Vision Assessment/Intervention    Visual Impairment/Limitations WFL  -EC (r) LB (t) EC (c)       Row Name 06/13/25 6454          Range of Motion Comprehensive    General  Range of Motion bilateral upper extremity ROM WNL  -EC (r) LB (t) EC (c)     Comment, General Range of Motion Pt. demonstrated UE ROM WNL  -EC (r) LB (t) EC (c)       Row Name 06/13/25 1301          Strength Comprehensive (MMT)    General Manual Muscle Testing (MMT) Assessment no strength deficits identified  -EC (r) LB (t) EC (c)       Row Name 06/13/25 1301          Balance    Balance Assessment sitting static balance;sitting dynamic balance;standing static balance;standing dynamic balance;sit to stand dynamic balance  -EC (r) LB (t) EC (c)     Static Sitting Balance independent  -EC (r) LB (t) EC (c)     Dynamic Sitting Balance independent  -EC (r) LB (t) EC (c)     Position, Sitting Balance unsupported;sitting edge of bed;supported;sitting in chair  -EC (r) LB (t) EC (c)     Sit to Stand Dynamic Balance contact guard;1-person assist  -EC (r) LB (t) EC (c)     Static Standing Balance contact guard;1-person assist  -EC (r) LB (t) EC (c)     Dynamic Standing Balance contact guard;1-person assist  -EC (r) LB (t) EC (c)     Position/Device Used, Standing Balance supported  supported by therapist hand prn  -EC (r) LB (t) EC (c)     Balance Interventions sitting;standing;sit to stand;static;dynamic;occupation based/functional task  -EC (r) LB (t) EC (c)               User Key  (r) = Recorded By, (t) = Taken By, (c) = Cosigned By      Initials Name Provider Type    EC Miroslava Parham, OTR/L Occupational Therapist    Lorena Silva, OT Student OT Student                   Goals/Plan       Row Name 06/13/25 1301          Bathing Goal 1 (OT)    Activity/Device (Bathing Goal 1, OT) lower body bathing  -EC (r) LB (t) EC (c)     [place] Level/Cues Needed (Bathing Goal 1, OT) modified independence  -EC (r) LB (t) EC (c)     Time Frame (Bathing Goal 1, OT) long term goal (LTG);by discharge  -EC (r) LB (t) EC (c)     Progress/Outcomes (Bathing Goal 1, OT) new goal  -EC (r) LB (t) EC (c)       Row Name 06/13/25 1743           Dressing Goal 1 (OT)    Activity/Device (Dressing Goal 1, OT) lower body dressing  -EC (r) LB (t) EC (c)     [place]/Cues Needed (Dressing Goal 1, OT) modified independence  -EC (r) LB (t) EC (c)     Time Frame (Dressing Goal 1, OT) long term goal (LTG);by discharge  -EC (r) LB (t) EC (c)     Progress/Outcome (Dressing Goal 1, OT) new goal  -EC (r) LB (t) EC (c)       Row Name 06/13/25 1301          Problem Specific Goal 1 (OT)    Problem Specific Goal 1 (OT) Pt will independently implement one pain management technique to decrease pain and improve functional adl performance.  -EC (r) LB (t) EC (c)     Time Frame (Problem Specific Goal 1, OT) long term goal (LTG);by discharge  -EC (r) LB (t) EC (c)     Progress/Outcome (Problem Specific Goal 1, OT) new goal  -EC (r) LB (t) EC (c)       Row Name 06/13/25 1301          Therapy Assessment/Plan (OT)    Planned Therapy Interventions (OT) transfer/mobility retraining;strengthening exercise;patient/caregiver education/training;occupation/activity based interventions;functional balance retraining;activity tolerance training;BADL retraining;ROM/therapeutic exercise  -EC (r) LB (t) EC (c)               User Key  (r) = Recorded By, (t) = Taken By, (c) = Cosigned By      Initials Name Provider Type    Miroslava Ro, OTR/L Occupational Therapist    LB Lorena Jefferson, OT Student OT Student                   Clinical Impression       Row Name 06/13/25 1301          Pain Assessment    Pretreatment Pain Rating 6/10  -EC (r) LB (t) EC (c)     Posttreatment Pain Rating 5/10  -EC (r) LB (t) EC (c)     Pain Location back  -EC (r) LB (t) EC (c)     Pain Side/Orientation lower  -EC (r) LB (t) EC (c)     Pain Management Interventions activity modification encouraged;exercise or physical activity utilized  -EC (r) LB (t) EC (c)     Response to Pain Interventions activity participation with decreased pain  -EC (r) LB (t) EC (c)       Row Name 06/13/25 1301          Plan of Care  Review    Plan of Care Reviewed With patient;spouse  -EC (r) LB (t) EC (c)     Progress improving  -EC (r) LB (t) EC (c)     Outcome Evaluation OT eval completed on this date. Pt. presents sitting EOB attempting to urinate upon OT entry and requested privacy. Pt. was unable to urniate sitting EOB and expressed desire to sit on the commode in BR. Mr. Piedra was educated on spinal precautions and the use of LSO brace, and rated his pain a 6/10.. Pt. completed sit<>stand with [place] x2 and walked to the BR with CGA x1. Pt. used grab bars to sit down safely on commode. Pt. stood CGAx1 from commode and walked to the chair, sitting down with CGA x1 for safety. Pt. stated that he felt much better while walking, with decreased pain rating of 5/10. Pt. was A&Ox4, alert and willing to participate. Pt. demonstrated ROM WNL and MMT 5/5 on B UE while sitting in chair. Mr. Piedra was left sitting up in the chair, spouse in the room, call light nearby, and encouraged to call nsg if needed. Pt. would benefit from continued skilled OT intervention to address fxl mobility, fxl ax fabrice, and pain management strategies. Recommend d/c to home with assist.  -EC (r) LB (t) EC (c)       Row Name 06/13/25 1301          Therapy Assessment/Plan (OT)    Rehab Potential (OT) good  -EC (r) LB (t) EC (c)     Criteria for Skilled Therapeutic Interventions Met (OT) yes;skilled treatment is necessary  -EC (r) LB (t) EC (c)     Therapy Frequency (OT) 5 times/wk  -EC (r) LB (t) EC (c)     Predicted Duration of Therapy Intervention (OT) 10 days  -EC (r) LB (t) EC (c)       Row Name 06/13/25 1301          Therapy Plan Review/Discharge Plan (OT)    Equipment Needs Upon Discharge (OT) bathing equipment;toileting equipment  -EC (r) LB (t) EC (c)     Anticipated Discharge Disposition (OT) home with assist  -EC (r) LB (t) EC (c)       Row Name 06/13/25 1301          Vital Signs    O2 Delivery Pre Treatment room air  -EC (r) LB (t) EC (c)     O2 Delivery  Intra Treatment room air  -EC (r) LB (t) EC (c)     O2 Delivery Post Treatment room air  -EC (r) LB (t) EC (c)     Pre Patient Position Sitting  -EC (r) LB (t) EC (c)     Intra Patient Position Standing  -EC (r) LB (t) EC (c)     Post Patient Position Sitting  -EC (r) LB (t) EC (c)       Row Name 06/13/25 1301          Positioning and Restraints    Pre-Treatment Position in bed  -EC (r) LB (t) EC (c)     Post Treatment Position chair  -EC (r) LB (t) EC (c)     In Chair sitting;call light within reach;encouraged to call for assist;with family/caregiver  -EC (r) LB (t) EC (c)               User Key  (r) = Recorded By, (t) = Taken By, (c) = Cosigned By      Initials Name Provider Type    EC Miroslava Parham, OTR/L Occupational Therapist    Lorena Silva, OT Student OT Student                   Outcome Measures       Row Name 06/13/25 1301          How much help from another is currently needed...    Putting on and taking off regular lower body clothing? 2  -EC (r) LB (t) EC (c)     Bathing (including washing, rinsing, and drying) 2  -EC (r) LB (t) EC (c)     Toileting (which includes using toilet bed pan or urinal) 3  -EC (r) LB (t) EC (c)     Putting on and taking off regular upper body clothing 3  -EC (r) LB (t) EC (c)     Taking care of personal grooming (such as brushing teeth) 4  -EC (r) LB (t) EC (c)     Eating meals 4  -EC (r) LB (t) EC (c)     AM-PAC 6 Clicks Score (OT) 18  -EC (r) LB (t)       Row Name 06/13/25 1300          How much help from another person do you currently need...    Turning from your back to your side while in flat bed without using bedrails? 3  -MS     Moving from lying on back to sitting on the side of a flat bed without bedrails? 3  -MS     Moving to and from a bed to a chair (including a wheelchair)? 3  -MS     Standing up from a chair using your arms (e.g., wheelchair, bedside chair)? 3  -MS     Climbing 3-5 steps with a railing? 3  -MS     To walk in hospital room? 3  -MS     AM-PAC  6 Clicks Score (PT) 18  -MS     Highest Level of Mobility Goal Walk 10 Steps or More-6  -MS       Row Name 06/13/25 1301 06/13/25 1300       Functional Assessment    Outcome Measure Options AM-PAC 6 Clicks Daily Activity (OT)  -EC (r) LB (t) EC (c) AM-PAC 6 Clicks Basic Mobility (PT)  -MS              User Key  (r) = Recorded By, (t) = Taken By, (c) = Cosigned By      Initials Name Provider Type    MS Rachel Morton R, PT, DPT, NCS Physical Therapist    Miroslava Ro, OTR/L Occupational Therapist    Lorena Silva, OT Student OT Student                    Occupational Therapy Education       Title: PT OT SLP Therapies (In Progress)       Topic: Occupational Therapy (Done)       Point: ADL training (Done)       Learning Progress Summary            Patient Acceptance, E, DU,VU by  at 6/13/2025 1301    Comment: Pt. educated on the use of his LSO and spinal precautions.   Family Acceptance, E, DU,VU by  at 6/13/2025 1301    Comment: Pt. educated on the use of his LSO and spinal precautions.                      Point: Precautions (Done)       Learning Progress Summary            Patient Acceptance, E, DU,VU by  at 6/13/2025 1301    Comment: Pt. educated on the use of his LSO and spinal precautions.   Family Acceptance, E, DU,VU by LB at 6/13/2025 1301    Comment: Pt. educated on the use of his LSO and spinal precautions.                      Point: Body mechanics (Done)       Learning Progress Summary            Patient Acceptance, E, DU,VU by  at 6/13/2025 1301    Comment: Pt. educated on the use of his LSO and spinal precautions.   Family Acceptance, E, DU,VU by  at 6/13/2025 1301    Comment: Pt. educated on the use of his LSO and spinal precautions.                                      User Key       Initials Effective Dates Name Provider Type Discipline     05/12/25 -  Lorena Jefferson, OT Student OT Student OT                  OT Recommendation and Plan  Planned Therapy Interventions (OT):  transfer/mobility retraining, strengthening exercise, patient/caregiver education/training, occupation/activity based interventions, functional balance retraining, activity tolerance training, BADL retraining, ROM/therapeutic exercise  Therapy Frequency (OT): 5 times/wk  Plan of Care Review  Plan of Care Reviewed With: patient, spouse  Progress: improving  Outcome Evaluation: OT eval completed on this date. Pt. presents sitting EOB attempting to urinate upon OT entry and requested privacy. Pt. was unable to urniate sitting EOB and expressed desire to sit on the commode in BR. Mr. Piedra was educated on spinal precautions and the use of LSO brace, and rated his pain a 6/10.. Pt. completed sit<>stand with [place] x2 and walked to the BR with CGA x1. Pt. used grab bars to sit down safely on commode. Pt. stood CGAx1 from commode and walked to the chair, sitting down with CGA x1 for safety. Pt. stated that he felt much better while walking, with decreased pain rating of 5/10. Pt. was A&Ox4, alert and willing to participate. Pt. demonstrated ROM WNL and MMT 5/5 on B UE while sitting in chair. Mr. Piedra was left sitting up in the chair, spouse in the room, call light nearby, and encouraged to call nsg if needed. Pt. would benefit from continued skilled OT intervention to address fxl mobility, fxl ax fabrice, and pain management strategies. Recommend d/c to home with assist.     Time Calculation:         Time Calculation- OT       Row Name 06/13/25 1107             Time Calculation- OT    OT Start Time 1303  10 min for chart review and attempted eval  -EC (r) LB (t) EC (c)      OT Stop Time 1332  -EC (r) LB (t) EC (c)      OT Time Calculation (min) 29 min  -EC (r) LB (t)      Total Timed Code Minutes- OT 39 minute(s)  -EC (r) LB (t) EC (c)      OT Received On 06/13/25  -EC (r) LB (t) EC (c)      OT Goal Re-Cert Due Date 06/23/25  -EC (r) LB (t) EC (c)         Untimed Charges    OT Eval/Re-eval Minutes 39  -EC (r) LB (t) EC  (c)         Total Minutes    Untimed Charges Total Minutes 39  -EC (r) LB (t)       Total Minutes 39  -EC (r) LB (t)                User Key  (r) = Recorded By, (t) = Taken By, (c) = Cosigned By      Initials Name Provider Type    EC Miroslava Parham, OTR/L Occupational Therapist    Lorena Silva, OT Student OT Student                           Lorena Jefferson, OT Student  6/13/2025

## 2025-06-13 NOTE — ANESTHESIA PREPROCEDURE EVALUATION
Anesthesia Evaluation     no history of anesthetic complications:   NPO Solid Status: > 8 hours  NPO Liquid Status: > 8 hours           Airway   Mallampati: I  TM distance: >3 FB  No difficulty expected  Dental      Pulmonary    (-) sleep apnea, not a smoker  Cardiovascular   Exercise tolerance: good (4-7 METS)    (+) hypertension, hyperlipidemia  (-) CAD      Neuro/Psych  (-) seizures, TIA, CVA  GI/Hepatic/Renal/Endo    (-) liver disease, no renal disease, diabetes    Musculoskeletal     Abdominal    Substance History      OB/GYN          Other   arthritis, blood dyscrasia anemia,   history of cancer (prostate cancer) remission                      Anesthesia Plan    ASA 2     general     intravenous induction     Anesthetic plan, risks, benefits, and alternatives have been provided, discussed and informed consent has been obtained with: patient.        CODE STATUS:

## 2025-06-14 VITALS
SYSTOLIC BLOOD PRESSURE: 144 MMHG | TEMPERATURE: 98 F | BODY MASS INDEX: 30.97 KG/M2 | WEIGHT: 197.75 LBS | OXYGEN SATURATION: 94 % | RESPIRATION RATE: 16 BRPM | DIASTOLIC BLOOD PRESSURE: 57 MMHG | HEART RATE: 72 BPM

## 2025-06-14 PROCEDURE — 25010000002 MORPHINE PER 10 MG: Performed by: NEUROLOGICAL SURGERY

## 2025-06-14 PROCEDURE — A9270 NON-COVERED ITEM OR SERVICE: HCPCS | Performed by: NEUROLOGICAL SURGERY

## 2025-06-14 PROCEDURE — 63710000001 ACETAMINOPHEN 325 MG TABLET: Performed by: NEUROLOGICAL SURGERY

## 2025-06-14 PROCEDURE — 63710000001 CARISOPRODOL 350 MG TABLET: Performed by: NEUROLOGICAL SURGERY

## 2025-06-14 PROCEDURE — 99024 POSTOP FOLLOW-UP VISIT: CPT | Performed by: NEUROLOGICAL SURGERY

## 2025-06-14 PROCEDURE — 63710000001 OXYCODONE 5 MG TABLET: Performed by: NEUROLOGICAL SURGERY

## 2025-06-14 RX ORDER — CARISOPRODOL 350 MG/1
350 TABLET ORAL 4 TIMES DAILY PRN
Qty: 15 TABLET | Refills: 0 | Status: SHIPPED | OUTPATIENT
Start: 2025-06-14 | End: 2025-06-24 | Stop reason: SDUPTHER

## 2025-06-14 RX ORDER — PLECANATIDE 3 MG/1
3 TABLET ORAL EVERY 12 HOURS PRN
Qty: 30 TABLET | Refills: 0 | Status: SHIPPED | OUTPATIENT
Start: 2025-06-14

## 2025-06-14 RX ORDER — OXYCODONE AND ACETAMINOPHEN 10; 325 MG/1; MG/1
1 TABLET ORAL EVERY 6 HOURS PRN
Qty: 24 TABLET | Refills: 0 | Status: SHIPPED | OUTPATIENT
Start: 2025-06-14 | End: 2025-06-18 | Stop reason: SDUPTHER

## 2025-06-14 RX ADMIN — CARISOPRODOL 350 MG: 350 TABLET ORAL at 06:48

## 2025-06-14 RX ADMIN — OXYCODONE HYDROCHLORIDE 10 MG: 5 TABLET ORAL at 04:57

## 2025-06-14 RX ADMIN — MORPHINE SULFATE 2 MG: 2 INJECTION, SOLUTION INTRAMUSCULAR; INTRAVENOUS at 09:29

## 2025-06-14 RX ADMIN — Medication 3 ML: at 09:30

## 2025-06-14 RX ADMIN — MORPHINE SULFATE 2 MG: 2 INJECTION, SOLUTION INTRAMUSCULAR; INTRAVENOUS at 06:48

## 2025-06-14 RX ADMIN — ACETAMINOPHEN 650 MG: 325 TABLET ORAL at 04:56

## 2025-06-14 RX ADMIN — MORPHINE SULFATE 2 MG: 2 INJECTION, SOLUTION INTRAMUSCULAR; INTRAVENOUS at 01:17

## 2025-06-14 NOTE — THERAPY DISCHARGE NOTE
Acute Care - Occupational Therapy Discharge Summary  [facility]     Patient Name: Karan Piedra  : 1945  MRN: [id number]    Today's Date: 2025                 Admit Date: 2025        OT Recommendation and Plan    Visit Dx:    ICD-10-CM ICD-9-CM   1. Impaired mobility [Z74.09]  Z74.09 799.89   2. Degeneration of intervertebral disc of lumbosacral region with discogenic back pain  M51.370 722.52   3. Acute midline low back pain without sciatica  M54.50 724.2   4. Fusion of spine, thoracic region  M43.24 724.9                OT Rehab Goals       Row Name 25 1502             Bathing Goal 1 (OT)    Activity/Device (Bathing Goal 1, OT) lower body bathing  -EC      [place] Level/Cues Needed (Bathing Goal 1, OT) modified independence  -EC      Time Frame (Bathing Goal 1, OT) long term goal (LTG);by discharge  -EC      Progress/Outcomes (Bathing Goal 1, OT) goal not met  -EC         Dressing Goal 1 (OT)    Activity/Device (Dressing Goal 1, OT) lower body dressing  -EC      [place]/Cues Needed (Dressing Goal 1, OT) modified independence  -EC      Time Frame (Dressing Goal 1, OT) long term goal (LTG);by discharge  -EC      Progress/Outcome (Dressing Goal 1, OT) goal not met  -EC         Problem Specific Goal 1 (OT)    Problem Specific Goal 1 (OT) Pt will independently implement one pain management technique to decrease pain and improve functional adl performance.  -EC      Time Frame (Problem Specific Goal 1, OT) long term goal (LTG);by discharge  -EC      Progress/Outcome (Problem Specific Goal 1, OT) goal not met  -EC                User Key  (r) = Recorded By, (t) = Taken By, (c) = Cosigned By      Initials Name Provider Type Discipline    EC Miroslava Parham, OTR/L Occupational Therapist OT                            Therapy Charges for Today       Code Description Service Date Service Provider Modifiers Qty    [id number]  OT EVAL LOW COMPLEXITY 3 2025 Miroslava Parham, OTR/L GO 1             OT Discharge Summary  Anticipated Discharge Disposition (OT): home with assist  Reason for Discharge: Discharge from facility  Outcomes Achieved: Refer to plan of care for updates on goals achieved  Discharge Destination: Home with assist      Miroslava Parham OTR/L  6/14/2025

## 2025-06-14 NOTE — DISCHARGE SUMMARY
Date of Discharge:  6/14/2025    Discharge Diagnosis: Back pain    Presenting Problem/History of Present Illness  Degeneration of intervertebral disc of lumbosacral region with discogenic back pain [M51.370]  Acute midline low back pain without sciatica [M54.50]  Fusion of spine of thoracic region [M43.24]  Fusion of spine, thoracic region [M43.24]       Hospital Course  Patient is a 80 y.o. male presented with thoracic spine pain.  He was taken to the operating for hemilaminectomy facetectomy foraminotomy at T11-12 with instrumented stabilization.  Postoperatively he did well up.  His back pain was controlled with medication.  He did not admit to any radicular pain.  He was tolerating all p.o. ambulating without assistance and voiding spontaneously.  At this point felt that he may get discharged home.    Procedures Performed  Procedure(s):  THORACIC RIGHT H;JAYCE LAMINECTOMY, FACETECTOMY POSTERIOR FUSION WITH INSTRUMENTATION T11-12       Consults:   Consults       No orders found for last 30 day(s).            Pertinent Test Results: radiology: X-Ray: Thoracic spine    Condition on Discharge: Stable    Vital Signs  Temp:  [94.6 °F (34.8 °C)-98.4 °F (36.9 °C)] 98 °F (36.7 °C)  Heart Rate:  [51-78] 72  Resp:  [11-18] 16  BP: ()/(39-63) 144/57    Physical Exam:   Physical Exam  Constitutional:       General: He is awake.   Eyes:      Extraocular Movements: Extraocular movements intact.      Pupils: Pupils are equal, round, and reactive to light.   Neurological:      Motor: Motor strength is normal.     Deep Tendon Reflexes: Reflexes are normal and symmetric.   Psychiatric:         Speech: Speech normal.          Neurological Exam  Mental Status  Awake. Oriented to person, place and time. Speech is normal. Language is fluent with no aphasia. Attention and concentration are normal.    Cranial Nerves  CN I: Sense of smell is normal.  CN II: Right normal visual field. Left normal visual field.  CN III, IV, VI:  Extraocular movements intact bilaterally. Pupils equal round and reactive to light bilaterally.  CN V: Facial sensation is normal.  CN VII:  Right: There is no facial weakness.  Left: There is no facial weakness.  CN XI: Shoulder shrug strength is normal.  CN XII: Tongue midline without atrophy or fasciculations.    Motor  Normal muscle bulk throughout. Normal muscle tone. Strength is 5/5 throughout all four extremities.    Sensory  Sensation is intact to light touch, pinprick, vibration and proprioception in all four extremities.    Reflexes  Deep tendon reflexes are 2+ and symmetric in all four extremities.    Gait  Normal casual, toe, heel and tandem gait.         Discharge Disposition  Home or Self Care    Discharge Medications     Discharge Medications        New Medications        Instructions Start Date   carisoprodol 350 MG tablet  Commonly known as: Soma   350 mg, Oral, 4 Times Daily PRN      naloxone 4 MG/0.1ML nasal spray  Commonly known as: NARCAN   Call 911. Don't prime. [place] in 1 nostril for overdose. Repeat in 2-3 minutes in other nostril if no or minimal breathing/responsiveness.      oxyCODONE-acetaminophen  MG per tablet  Commonly known as: Percocet   1 tablet, Oral, Every 6 Hours PRN             Continue These Medications        Instructions Start Date   aspirin 81 MG EC tablet   81 mg, Daily      Cyanocobalamin 1000 MCG/ML kit   1 mL, Weekly      FeroSul 325 (65 Fe) MG tablet  Generic drug: ferrous sulfate   1 tablet, Every 12 Hours Scheduled      irbesartan 300 MG tablet  Commonly known as: AVAPRO   300 mg, Oral, Daily      meloxicam 7.5 MG tablet  Commonly known as: MOBIC   7.5 mg, Oral, Daily      pantoprazole 40 MG EC tablet  Commonly known as: PROTONIX   40 mg, Daily      rosuvastatin 5 MG tablet  Commonly known as: CRESTOR   Take 1 tablet by mouth Every Night.             Stop These Medications      HYDROcodone-acetaminophen  MG per tablet  Commonly known as: NORCO      tiZANidine 4 MG tablet  Commonly known as: ZANAFLEX              Discharge Diet:   Diet Instructions       Diet: Regular/House Diet; Regular Texture (IDDSI 7); Thin (IDDSI 0)      Discharge Diet: Regular/House Diet    Texture: Regular Texture (IDDSI 7)    Fluid Consistency: Thin (IDDSI 0)            Activity at Discharge:   Activity Instructions       Other Instructions (Specify)      Activity Instructions: no strenuous activity. Wear brace when out of bed or sitting up.  No NSAIDs            Follow-up Appointments  Future Appointments   Date Time Provider Department Center   7/7/2025 10:45 AM Shreya Kurtz APRN [facility]   9/4/2025  4:15 PM Mahendra Santana MD [facility]   11/4/2025  1:30 PM Chemo Tierney MD [facility]     Additional Instructions for the Follow-ups that You Need to Schedule       Call MD With Problems / Concerns   As directed      Call with worsening back or leg pain, drainage from wound, fever, or difficulty walking    Order Comments: Call with worsening back or leg pain, drainage from wound, fever, or difficulty walking         Discharge Follow-up with Specialty: shreya kurtz np; 3 Weeks   As directed      Specialty: shreya kurtz np   Follow Up: 3 Weeks                Test Results Pending at Discharge       Mahendra Santana MD  06/14/25  08:38 CDT    Time: Discharge 45 min

## 2025-06-14 NOTE — PLAN OF CARE
Goal Outcome Evaluation: Patient A+Ox4, JIMÉNEZ- no reported numbness or tingling. Dressing to posterior incision changed per wife's request- small hematoma, soft to touch, to upper right incision -new picture documented and MD notified. OK for DC and meds reviewed- Brace in use. Safety maintained. Tolerating food and drink without issues.

## 2025-06-14 NOTE — PLAN OF CARE
Goal Outcome Evaluation:              Outcome Evaluation: Pt A&Ox4, JIMÉNEZ, strong  and push pulls. Up standby with TLSO and walker. Marked drainage to back dressing. Voiding per urinal and toilet. New IV to RFA. Medications given per orders, PRN Cassie, Soma, and Morphine. On RA, . Safety maintained, call light within reach, wife at bedside.

## 2025-06-15 NOTE — THERAPY DISCHARGE NOTE
Acute Care - Physical Therapy Discharge Summary  [facility]       Patient Name: Karan Piedra  : 1945  MRN: [id number]    Today's Date: 6/15/2025                 Admit Date: 2025      PT Recommendation and Plan    Visit Dx:    ICD-10-CM ICD-9-CM   1. Impaired mobility [Z74.09]  Z74.09 799.89   2. Degeneration of intervertebral disc of lumbosacral region with discogenic back pain  M51.370 722.52   3. Acute midline low back pain without sciatica  M54.50 724.2   4. Fusion of spine, thoracic region  M43.24 724.9                PT Rehab Goals       Row Name 06/15/25 0812             Bed Mobility Goal 1 (PT)    Activity/Assistive Device (Bed Mobility Goal 1, PT) bed mobility activities, all  -AE      [place] Level/Cues Needed (Bed Mobility Goal 1, PT) independent  -AE      Time Frame (Bed Mobility Goal 1, PT) long term goal (LTG);by discharge  -AE      Progress/Outcomes (Bed Mobility Goal 1, PT) goal met  -AE         Transfer Goal 1 (PT)    Activity/Assistive Device (Transfer Goal 1, PT) sit-to-stand/stand-to-sit;bed-to-chair/chair-to-bed  -AE      [place] Level/Cues Needed (Transfer Goal 1, PT) independent  -AE      Time Frame (Transfer Goal 1, PT) long term goal (LTG);by discharge  -AE      Progress/Outcome (Transfer Goal 1, PT) goal not met  -AE         Gait Training Goal 1 (PT)    Activity/Assistive Device (Gait Training Goal 1, PT) gait (walking locomotion);decrease fall risk;increase endurance/gait distance;improve balance and speed  -AE      [place] Level (Gait Training Goal 1, PT) independent  -AE      Distance (Gait Training Goal 1, PT) 150ft  -AE      Time Frame (Gait Training Goal 1, PT) long term goal (LTG);by discharge  -AE      Progress/Outcome (Gait Training Goal 1, PT) goal not met  -AE         Stairs Goal 1 (PT)    Activity/Assistive Device (Stairs Goal 1, PT) ascending stairs;descending stairs;using handrail, right;step-to-step  -AE      [place] Level/Cues Needed  (Stairs Goal 1, PT) modified independence  -AE      Number of Stairs (Stairs Goal 1, PT) 3  -AE      Time Frame (Stairs Goal 1, PT) long term goal (LTG);by discharge  -AE      Progress/Outcome (Stairs Goal 1, PT) goal not met  -AE         Problem Specific Goal 1 (PT)    Problem Specific Goal 1 (PT) The patient will independently implement 1 pain management technique to decrease pain in order to improve functional mobility.  -AE      Time Frame (Problem Specific Goal 1, PT) long-term goal (LTG);by discharge  -AE      Progress/Outcome (Problem Specific Goal 1, PT) new goal  -AE                User Key  (r) = Recorded By, (t) = Taken By, (c) = Cosigned By      Initials Name Provider Type Discipline    AE Jennifer Sharma PTA Physical Therapist Assistant PT                        PT Discharge Summary  Anticipated Discharge Disposition (PT): home with assist  Reason for Discharge: Discharge from facility  Outcomes Achieved: Patient able to partially acheive established goals  Discharge Destination: Home      Jennifer Sharma PTA   6/15/2025

## 2025-06-18 DIAGNOSIS — M43.24 FUSION OF SPINE, THORACIC REGION: ICD-10-CM

## 2025-06-18 RX ORDER — OXYCODONE AND ACETAMINOPHEN 10; 325 MG/1; MG/1
1 TABLET ORAL EVERY 6 HOURS PRN
Qty: 28 TABLET | Refills: 0 | Status: SHIPPED | OUTPATIENT
Start: 2025-06-18

## 2025-06-24 DIAGNOSIS — M43.24 FUSION OF SPINE, THORACIC REGION: ICD-10-CM

## 2025-06-24 RX ORDER — CARISOPRODOL 350 MG/1
350 TABLET ORAL 4 TIMES DAILY PRN
Qty: 28 TABLET | Refills: 0 | Status: SHIPPED | OUTPATIENT
Start: 2025-06-24

## 2025-06-24 NOTE — TELEPHONE ENCOUNTER
Patient called yesterday and left a  requesting a refill on his Soma.  Script sent to [facility]  CLINICAL COORDINATOR  DR ERA BASS  [facility] NEUROSURGERY

## 2025-06-26 DIAGNOSIS — M43.24 FUSION OF SPINE, THORACIC REGION: ICD-10-CM

## 2025-06-26 RX ORDER — OXYCODONE AND ACETAMINOPHEN 10; 325 MG/1; MG/1
1 TABLET ORAL EVERY 6 HOURS PRN
Qty: 12 TABLET | Refills: 0 | Status: SHIPPED | OUTPATIENT
Start: 2025-06-26

## 2025-07-07 ENCOUNTER — OFFICE VISIT (OUTPATIENT)
Dept: NEUROSURGERY | Facility: CLINIC | Age: 80
End: 2025-07-07
Payer: MEDICARE

## 2025-07-07 ENCOUNTER — HOSPITAL ENCOUNTER (OUTPATIENT)
Dept: GENERAL RADIOLOGY | Facility: HOSPITAL | Age: 80
Discharge: HOME | End: 2025-07-07
Admitting: NURSE PRACTITIONER
Payer: MEDICARE

## 2025-07-07 VITALS — BODY MASS INDEX: 30.92 KG/M2 | WEIGHT: 197 LBS | HEIGHT: 67 IN

## 2025-07-07 DIAGNOSIS — Z78.9 NON-SMOKER: ICD-10-CM

## 2025-07-07 DIAGNOSIS — E66.811 CLASS 1 OBESITY DUE TO EXCESS CALORIES WITH SERIOUS COMORBIDITY AND BODY MASS INDEX (BMI) OF 30.0 TO 30.9 IN ADULT: ICD-10-CM

## 2025-07-07 DIAGNOSIS — M51.370 DEGENERATION OF INTERVERTEBRAL DISC OF LUMBOSACRAL REGION WITH DISCOGENIC BACK PAIN: ICD-10-CM

## 2025-07-07 DIAGNOSIS — M51.370 DEGENERATION OF INTERVERTEBRAL DISC OF LUMBOSACRAL REGION WITH DISCOGENIC BACK PAIN: Primary | ICD-10-CM

## 2025-07-07 DIAGNOSIS — E66.09 CLASS 1 OBESITY DUE TO EXCESS CALORIES WITH SERIOUS COMORBIDITY AND BODY MASS INDEX (BMI) OF 30.0 TO 30.9 IN ADULT: ICD-10-CM

## 2025-07-07 PROCEDURE — 72072 X-RAY EXAM THORAC SPINE 3VWS: CPT

## 2025-07-07 PROCEDURE — 1160F RVW MEDS BY RX/DR IN RCRD: CPT | Performed by: NURSE PRACTITIONER

## 2025-07-07 PROCEDURE — 1159F MED LIST DOCD IN RCRD: CPT | Performed by: NURSE PRACTITIONER

## 2025-07-07 PROCEDURE — 99024 POSTOP FOLLOW-UP VISIT: CPT | Performed by: NURSE PRACTITIONER

## 2025-07-07 NOTE — PATIENT INSTRUCTIONS
Advance Care Planning and Advance Directives     You make decisions on a daily basis - decisions about where you want to live, your career, your home, your life. Perhaps one of the most important decisions you face is your choice for future medical care. Take time to talk with your family and your healthcare team and start planning today.  Advance Care Planning is a process that can help you:  Understand possible future healthcare decisions in light of your own experiences  Reflect on those decision in light of your goals and values  Discuss your decisions with those closest to you and the healthcare professionals that care for you  Make a plan by creating a document that reflects your wishes    Surrogate Decision Maker  In the event of a medical emergency, which has left you unable to communicate or to make your own decisions, you would need someone to make decisions for you.  It is important to discuss your preferences for medical treatment with this person while you are in good health.     Qualities of a surrogate decision maker:  Willing to take on this role and responsibility  Knows what you want for future medical care  Willing to follow your wishes even if they don't agree with them  Able to make difficult medical decisions under stressful circumstances    Advance Directives  These are legal documents you can create that will guide your healthcare team and decision maker(s) when needed. These documents can be stored in the electronic medical record.    Living Will - a legal document to guide your care if you have a terminal condition or a serious illness and are unable to communicate. States vary by statute in document names/types, but most forms may include one or more of the following:        -  Directions regarding life-prolonging treatments        -  Directions regarding artificially provided nutrition/hydration        -  Choosing a healthcare decision maker        -  Direction regarding organ/tissue  donation    Durable Power of  for Healthcare - this document names an -in-fact to make medical decisions for you, but it may also allow this person to make personal and financial decisions for you. Please seek the advice of an  if you need this type of document.    **Advance Directives are not required and no one may discriminate against you if you do not sign one.    Medical Orders  Many states allow specific forms/orders signed by your physician to record your wishes for medical treatment in your current state of health. This form, signed in personal communication with your physician, addresses resuscitation and other medical interventions that you may or may not want.      For more information or to schedule a time with a [facility] Advance Care Planning Facilitator contact: [facility] or call [phone] and someone will contact you directly.BMI for Adults  What is BMI?  Body mass index (BMI) is a number that is calculated from a person's weight and height. BMI can help estimate how much of a person's weight is composed of fat. BMI does not measure body fat directly. Rather, it is an alternative to procedures that directly measure body fat, which can be difficult and expensive.  BMI can help identify people who may be at higher risk for certain medical problems.  What are BMI measurements used for?  BMI is used as a screening tool to identify possible weight problems. It helps determine whether a person is obese, overweight, a healthy weight, or underweight.  BMI is useful for:  Identifying a weight problem that may be related to a medical condition or may increase the risk for medical problems.  Promoting changes, such as changes in diet and exercise, to help reach a healthy weight. BMI screening can be repeated to see if these changes are working.  How is BMI calculated?  BMI involves measuring your weight in relation to your height. Both height and weight are measured,  "and the BMI is calculated from those numbers. This can be done either in English ([place] or metric measurements. Note that charts and online BMI calculators are available to help you find your BMI quickly and easily without having to do these calculations yourself.  To calculate your BMI in English ([place] measurements:    Measure your weight in pounds (lb).  Multiply the number of pounds by 703.  For example, for a person who weighs 180 lb, multiply that number by 703, which equals 126,540.  Measure your height in inches. Then multiply that number by itself to get a measurement called \"inches squared.\"  For example, for a person who is 70 inches tall, the \"inches squared\" measurement is 70 inches x 70 inches, which equals 4,900 inches squared.  Divide the total from step 2 (number of lb x 703) by the total from step 3 (inches squared): 126,540 ÷ 4,900 = 25.8. This is your BMI.    To calculate your BMI in metric measurements:  Measure your weight in kilograms (kg).  Measure your height in meters (m). Then multiply that number by itself to get a measurement called \"meters squared.\"  For example, for a person who is 1.75 m tall, the \"meters squared\" measurement is 1.75 m x 1.75 m, which is equal to 3.1 meters squared.  Divide the number of kilograms (your weight) by the meters squared number. In this example: 70 ÷ 3.1 = 22.6. This is your BMI.  What do the results mean?  BMI charts are used to identify whether you are underweight, normal weight, overweight, or obese. The following guidelines will be used:  Underweight: BMI less than 18.5.  Normal weight: BMI between 18.5 and 24.9.  Overweight: BMI between 25 and 29.9.  Obese: BMI of 30 or above.  Keep these notes in mind:  Weight includes both fat and muscle, so someone with a muscular build, such as an athlete, may have a BMI that is higher than 24.9. In cases like these, BMI is not an accurate measure of body fat.  To determine if excess body fat is the cause of a BMI " "of 25 or higher, further assessments may need to be done by a health care provider.  BMI is usually interpreted in the same way for men and women.  Where to find more information  For more information about BMI, including tools to quickly calculate your BMI, go to these websites:  Centers for Disease Control and Prevention: www.cdc.gov  American Heart Association: www.heart.org  National Heart, Lung, and Blood [place]: www.nhlbi.nih.gov  Summary  Body mass index (BMI) is a number that is calculated from a person's weight and height.  BMI may help estimate how much of a person's weight is composed of fat. BMI can help identify those who may be at higher risk for certain medical problems.  BMI can be measured using English measurements or metric measurements.  BMI charts are used to identify whether you are underweight, normal weight, overweight, or obese.  This information is not intended to replace advice given to you by your health care provider. Make sure you discuss any questions you have with your health care provider.  Document Revised: 09/09/2020 Document Reviewed: 07/17/2020  [employer] Patient Education © 2021 [employer] https://www.nhlbi.nih.gov/files/docs/public/heart/dash_brief.pdf\">   DASH Eating Plan  DASH stands for Dietary Approaches to Stop Hypertension. The DASH eating plan is a healthy eating plan that has been shown to:  Reduce high blood pressure (hypertension).  Reduce your risk for type 2 diabetes, heart disease, and stroke.  Help with weight loss.  What are tips for following this plan?  Reading food labels  Check food labels for the amount of salt (sodium) per serving. Choose foods with less than 5 percent of the Daily Value of sodium. Generally, foods with less than 300 milligrams (mg) of sodium per serving fit into this eating plan.  To find whole grains, look for the word \"whole\" as the first word in the ingredient list.  Shopping  Buy products labeled as \"low-sodium\" or \"no salt " "added.\"  Buy fresh foods. Avoid canned foods and pre-made or frozen meals.  Cooking  Avoid adding salt when cooking. Use salt-free seasonings or herbs instead of table salt or sea salt. Check with your health care provider or pharmacist before using salt substitutes.  Do not meza foods. Cook foods using healthy methods such as baking, boiling, grilling, roasting, and broiling instead.  Cook with heart-healthy oils, such as olive, canola, avocado, soybean, or sunflower oil.  Meal planning    Eat a balanced diet that includes:  4 or more servings of fruits and 4 or more servings of vegetables each day. Try to fill one-half of your plate with fruits and vegetables.  6-8 servings of whole grains each day.  Less than 6 oz (170 g) of lean meat, poultry, or fish each day. A 3-oz (85-g) serving of meat is about the same size as a deck of cards. One egg equals 1 oz (28 g).  2-3 servings of low-fat dairy each day. One serving is 1 cup (237 mL).  1 serving of nuts, seeds, or beans 5 times each week.  2-3 servings of heart-healthy fats. Healthy fats called omega-3 fatty acids are found in foods such as walnuts, flaxseeds, fortified milks, and eggs. These fats are also found in cold-water fish, such as sardines, salmon, and mackerel.  Limit how much you eat of:  Canned or prepackaged foods.  Food that is high in trans fat, such as some fried foods.  Food that is high in saturated fat, such as fatty meat.  Desserts and other sweets, sugary drinks, and other foods with added sugar.  Full-fat dairy products.  Do not salt foods before eating.  Do not eat more than 4 egg yolks a week.  Try to eat at least 2 vegetarian meals a week.  Eat more home-cooked food and less restaurant, buffet, and fast food.    Lifestyle  When eating at a restaurant, ask that your food be prepared with less salt or no salt, if possible.  If you drink alcohol:  Limit how much you use to:  0-1 drink a day for women who are not pregnant.  0-2 drinks a day for " men.  Be aware of how much alcohol is in your drink. In the [place] one drink equals one 12 oz bottle of beer (355 mL), one 5 oz glass of wine (148 mL), or one 1½ oz glass of hard liquor (44 mL).  General information  Avoid eating more than 2,300 mg of salt a day. If you have hypertension, you may need to reduce your sodium intake to 1,500 mg a day.  Work with your health care provider to maintain a healthy body weight or to lose weight. Ask what an ideal weight is for you.  Get at least 30 minutes of exercise that causes your heart to beat faster (aerobic exercise) most days of the week. Activities may include walking, swimming, or biking.  Work with your health care provider or dietitian to adjust your eating plan to your individual calorie needs.  What foods should I eat?  Fruits  All fresh, dried, or frozen fruit. Canned fruit in natural juice (without added sugar).  Vegetables  Fresh or frozen vegetables (raw, steamed, roasted, or grilled). Low-sodium or reduced-sodium tomato and vegetable juice. Low-sodium or reduced-sodium tomato sauce and tomato paste. Low-sodium or reduced-sodium canned vegetables.  Grains  Whole-grain or whole-wheat bread. Whole-grain or whole-wheat pasta. Brown rice. Oatmeal. Quinoa. Bulgur. Whole-grain and low-sodium cereals. Kajal bread. Low-fat, low-sodium crackers. Whole-wheat flour tortillas.  Meats and other proteins  Skinless chicken or turkey. Ground chicken or turkey. Pork with fat trimmed off. Fish and seafood. Egg whites. Dried beans, peas, or lentils. Unsalted nuts, nut butters, and seeds. Unsalted canned beans. Lean cuts of beef with fat trimmed off. Low-sodium, lean precooked or cured meat, such as sausages or meat loaves.  Dairy  Low-fat (1%) or fat-free (skim) milk. Reduced-fat, low-fat, or fat-free cheeses. Nonfat, low-sodium ricotta or cottage cheese. Low-fat or nonfat yogurt. Low-fat, low-sodium cheese.  Fats and oils  Soft margarine without trans fats. Vegetable oil.  Reduced-fat, low-fat, or light mayonnaise and salad dressings (reduced-sodium). Canola, safflower, olive, avocado, soybean, and sunflower oils. Avocado.  Seasonings and condiments  Herbs. Spices. Seasoning mixes without salt.  Other foods  Unsalted popcorn and pretzels. Fat-free sweets.  The items listed above may not be a complete list of foods and beverages you can eat. Contact a dietitian for more information.  What foods should I avoid?  Fruits  Canned fruit in a light or heavy syrup. Fried fruit. Fruit in cream or butter sauce.  Vegetables  Creamed or fried vegetables. Vegetables in a cheese sauce. Regular canned vegetables (not low-sodium or reduced-sodium). Regular canned tomato sauce and paste (not low-sodium or reduced-sodium). Regular tomato and vegetable juice (not low-sodium or reduced-sodium). Pickles. Olives.  Grains  Baked goods made with fat, such as croissants, muffins, or some breads. Dry pasta or rice meal packs.  Meats and other proteins  Fatty cuts of meat. Ribs. Fried meat. Gonsales. Bologna, salami, and other precooked or cured meats, such as sausages or meat loaves. Fat from the back of a pig (fatback). Bratwurst. Salted nuts and seeds. Canned beans with added salt. Canned or smoked fish. Whole eggs or egg yolks. Chicken or turkey with skin.  Dairy  Whole or 2% milk, cream, and half-and-half. Whole or full-fat cream cheese. Whole-fat or sweetened yogurt. Full-fat cheese. Nondairy creamers. Whipped toppings. Processed cheese and cheese spreads.  Fats and oils  Butter. Stick margarine. Lard. Shortening. Ghee. Gonsales fat. Tropical oils, such as coconut, palm kernel, or palm oil.  Seasonings and condiments  Onion salt, garlic salt, seasoned salt, table salt, and sea salt. Worcestershire sauce. Tartar sauce. Barbecue sauce. Teriyaki sauce. Soy sauce, including reduced-sodium. Steak sauce. Canned and packaged gravies. Fish sauce. Oyster sauce. Cocktail sauce. Store-bought horseradish. Ketchup. Mustard.  Meat flavorings and tenderizers. Bouillon cubes. Hot sauces. Pre-made or packaged marinades. Pre-made or packaged taco seasonings. Relishes. Regular salad dressings.  Other foods  Salted popcorn and pretzels.  The items listed above may not be a complete list of foods and beverages you should avoid. Contact a dietitian for more information.  Where to find more information  National Heart, Lung, and Blood [place]: www.nhlbi.nih.gov  American Heart Association: www.heart.org  Academy of Nutrition and Dietetics: www.eatright.org  National Kidney Foundation: www.kidney.org  Summary  The DASH eating plan is a healthy eating plan that has been shown to reduce high blood pressure (hypertension). It may also reduce your risk for type 2 diabetes, heart disease, and stroke.  When on the DASH eating plan, aim to eat more fresh fruits and vegetables, whole grains, lean proteins, low-fat dairy, and heart-healthy fats.  With the DASH eating plan, you should limit salt (sodium) intake to 2,300 mg a day. If you have hypertension, you may need to reduce your sodium intake to 1,500 mg a day.  Work with your health care provider or dietitian to adjust your eating plan to your individual calorie needs.  This information is not intended to replace advice given to you by your health care provider. Make sure you discuss any questions you have with your health care provider.  Document Revised: 11/20/2020 Document Reviewed: 11/20/2020  [employer] Patient Education © 2021 [employer] High-Protein and High-Calorie Diet  Eating high-protein and high-calorie foods can help you to gain weight, heal after an injury, and recover after an illness or surgery. The specific amount of daily protein and calories you need depends on:  Your body weight.  The reason this diet is recommended for you.  What is my plan?  Generally, a high-protein, high-calorie diet involves:  Eating 250-500 extra calories each day.  Making sure that you get enough of your  daily calories from protein. Ask your health care provider how many of your calories should come from protein.  Talk with a health care provider, such as a diet and nutrition specialist (dietitian), about how much protein and how many calories you need each day. Follow the diet as directed by your health care provider.  What are tips for following this plan?    Preparing meals  Add whole milk, half-and-half, or heavy cream to cereal, pudding, soup, or hot cocoa.  Add whole milk to instant breakfast drinks.  Add peanut butter to oatmeal or smoothies.  Add powdered milk to baked goods, smoothies, or milkshakes.  Add powdered milk, cream, or butter to mashed potatoes.  Add cheese to cooked vegetables.  Make whole-milk yogurt parfaits. Top them with granola, fruit, or nuts.  Add cottage cheese to your fruit.  Add avocado, cheese, or both to sandwiches or salads.  Add meat, poultry, or seafood to rice, pasta, casseroles, salads, and soups.  Use mayonnaise when making egg salad, chicken salad, or tuna salad.  Use peanut butter as a dip for vegetables or as a topping for pretzels, celery, or crackers.  Add beans to casseroles, dips, and spreads.  Add pureed beans to sauces and soups.  Replace calorie-free drinks with calorie-containing drinks, such as milk and fruit juice.  Replace water with milk or heavy cream when making foods such as oatmeal, pudding, or cocoa.  General instructions  Ask your health care provider if you should take a nutritional supplement.  Try to eat six small meals each day instead of three large meals.  Eat a balanced diet. In each meal, include one food that is high in protein.  Keep nutritious snacks available, such as nuts, trail mixes, dried fruit, and yogurt.  If you have kidney disease or diabetes, talk with your health care provider about how much protein is safe for you. Too much protein may put extra stress on your kidneys.  Drink your calories. Choose high-calorie drinks and have them  after your meals.  What high-protein foods should I eat?    Vegetables  Soybeans. Peas.  Grains  Quinoa. Bulgur wheat.  Meats and other proteins  Beef, pork, and poultry. Fish and seafood. Eggs. Tofu. Textured vegetable protein (TVP). Peanut butter. Nuts and seeds. Dried beans. Protein powders.  Dairy  Whole milk. Whole-milk yogurt. Powdered milk. Cheese. Cottage Cheese. Eggnog.  Beverages  High-protein supplement drinks. Soy milk.  Other foods  Protein bars.  The items listed above may not be a complete list of high-protein foods and beverages. Contact a dietitian for more options.  What high-calorie foods should I eat?  Fruits  Dried fruit. Fruit leather. Canned fruit in syrup. Fruit juice. Avocado.  Vegetables  Vegetables cooked in oil or butter. Fried potatoes.  Grains  Pasta. Quick breads. Muffins. Pancakes. Ready-to-eat cereal.  Meats and other proteins  Peanut butter. Nuts and seeds.  Dairy  Heavy cream. Whipped cream. Cream cheese. Sour cream. Ice cream. Custard. Pudding.  Beverages  Meal-replacement beverages. Nutrition shakes. Fruit juice. Sugar-sweetened soft drinks.  Seasonings and condiments  Salad dressing. Mayonnaise. Leif sauce. Fruit preserves or jelly. Honey. Syrup.  Sweets and desserts  Cake. Cookies. Pie. Pastries. Candy bars. Chocolate.  Fats and oils  Butter or margarine. Oil. Gravy.  Other foods  Meal-replacement bars.  The items listed above may not be a complete list of high-calorie foods and beverages. Contact a dietitian for more options.  Summary  A high-protein, high-calorie diet can help you gain weight or heal faster after an injury, illness, or surgery.  To increase your protein and calories, add ingredients such as whole milk, peanut butter, cheese, beans, meat, or seafood to meal items.  To get enough extra calories each day, include high-calorie foods and beverages at each meal.  Adding a high-calorie drink or shake can be an easy way to help you get enough calories each day.  Talk with your healthcare provider or dietitian about the best options for you.  This information is not intended to replace advice given to you by your health care provider. Make sure you discuss any questions you have with your health care provider.  Document Revised: 11/30/2018 Document Reviewed: 10/30/2018  [employer] Patient Education © 2021 [employer]

## 2025-07-07 NOTE — PROGRESS NOTES
"  Chief complaint:   Chief Complaint   Patient presents with    Back Pain     Pt is here for 3wk p/o/f/u. Pt states since surgery his symptoms have improved.         Subjective     HPI: This is a 80 y.o. male patient who went to the operating room on 6/13/2025 for a Thoracic Right Yo Laminectomy, Facetectomy Posterior Fusion With Instrumentation T11-12 - Right. The patient is here in follow up today for postoperative visit.  Prior to surgery the patient was complaining of right paraspinal back pain that was radiating in a radicular fashion to the right mid axillary line.  The patient comes in today and says that he does feel like he is doing better than what he was prior to the surgery.  He does still have some occasional pain but overall feels like things are much better than how it was before the surgery.  Is not wearing his brace.  He is only taking over-the-counter medicine at this time        Review of Systems   Musculoskeletal:  Positive for arthralgias and back pain.         Objective      Vital Signs  Ht 170.2 cm (67\")   Wt 89.4 kg (197 lb)   BMI 30.85 kg/m²     Physical Exam  Constitutional:       General: He is awake.      Appearance: He is well-developed.   HENT:      Head: Normocephalic.   Eyes:      Extraocular Movements: Extraocular movements intact.      Pupils: Pupils are equal, round, and reactive to light.   Pulmonary:      Effort: Pulmonary effort is normal.   Musculoskeletal:         General: Normal range of motion.      Cervical back: Normal range of motion.   Skin:     General: Skin is warm.   Neurological:      Mental Status: He is alert and oriented to person, place, and time.      GCS: GCS eye subscore is 4. GCS verbal subscore is 5. GCS motor subscore is 6.      Cranial Nerves: No cranial nerve deficit.      Sensory: No sensory deficit.      Motor: Motor strength is normal.     Gait: Gait normal.      Deep Tendon Reflexes: Reflexes are normal and symmetric.   Psychiatric:         " Speech: Speech normal.         Behavior: Behavior normal.         Thought Content: Thought content normal.       Incisions clean dry and intact    Neurological Exam  Mental Status  Awake and alert. Oriented to person, place and time. Oriented to person, place, and time. Speech is normal. Language is fluent with no aphasia. Attention and concentration are normal.    Cranial Nerves  CN I: Sense of smell is normal.  CN II: Right normal visual field. Left normal visual field.  CN III, IV, VI: Extraocular movements intact bilaterally. Pupils equal round and reactive to light bilaterally.  CN V: Facial sensation is normal.  CN VII:  Right: There is no facial weakness.  Left: There is no facial weakness.  CN XI: Shoulder shrug strength is normal.  CN XII: Tongue midline without atrophy or fasciculations.    Motor  Normal muscle bulk throughout. Normal muscle tone. Strength is 5/5 throughout all four extremities.    Sensory  Sensation is intact to light touch, pinprick, vibration and proprioception in all four extremities.    Reflexes  Deep tendon reflexes are 2+ and symmetric in all four extremities.    Gait  Normal casual, toe, heel and tandem gait. Normal gait.      Imaging review: No new imaging          Assessment/Plan: Patient is doing well physical symptoms are improving.  I would have him go for x-rays of the thoracic spine to make sure that the hardware is stable.  He will keep his appointment with Dr. Santana.  We did go over activity restrictions.  He was told to call us if any further problems or concerns    Patient is a nonsmoker  The patient's Body mass index is 30.85 kg/m².. BMI is above normal parameters. Recommendations include: educational material and nutrition counseling    Diagnoses and all orders for this visit:    1. Degeneration of intervertebral disc of lumbosacral region with discogenic back pain (Primary)  -     XR Spine Thoracic 3 View; Future    2. Non-smoker    3. Class 1 obesity due to excess  calories with serious comorbidity and body mass index (BMI) of 30.0 to 30.9 in adult        I discussed the patients findings and my recommendations with patient  Paramjit Kurtz, LUZ MARIA  07/07/25  11:13 CDT  Answers submitted by the patient for this visit:  Post Operative Visit (Submitted on 7/5/2025)  Chief Complaint: Follow-up  Pain Control: controlled  Fever: no fever  Diet: adequate intake  Activity: returning to normal  Operative Site Issues: No

## 2025-08-25 ENCOUNTER — TELEPHONE (OUTPATIENT)
Dept: NEUROSURGERY | Facility: CLINIC | Age: 80
End: 2025-08-25
Payer: MEDICARE

## (undated) DEVICE — TOOL MR8-31TD3030 MR8 TWST DRIL 3MMX30MM: Brand: MIDAS REX

## (undated) DEVICE — KNIF BAYO METRX DISECT

## (undated) DEVICE — DISPOSABLE IRRIGATION CASSETTE: Brand: CORE

## (undated) DEVICE — TOWEL,OR,DSP,ST,BLUE,STD,4/PK,20PK/CS: Brand: MEDLINE

## (undated) DEVICE — VESSEL SEALER: Brand: ENDOWRIST;DAVINCI SI

## (undated) DEVICE — GLV SURG DERMASSURE GRN LF PF 8.0

## (undated) DEVICE — SNAR POLYP CAPTIVATOR MICROHEX 13 240CM

## (undated) DEVICE — GUIDE NDL BIOP 2 DARK GRN STRL 1P/U

## (undated) DEVICE — SUT PDS 0 CT 36IN VIO PDP358T

## (undated) DEVICE — TBG SMPL FLTR LINE NASL 02/C02 A/ BX/100

## (undated) DEVICE — CUFF,BP,DISP,1 TUBE,ADULT,HP: Brand: MEDLINE

## (undated) DEVICE — PK ENT HD AND NK 30

## (undated) DEVICE — PK SPINE POST 30

## (undated) DEVICE — SUT GUT CHRM 3/0 SH 27IN G122H

## (undated) DEVICE — GLV SURG BIOGEL M LTX PF 8

## (undated) DEVICE — ANTIBACTERIAL UNDYED BRAIDED (POLYGLACTIN 910), SYNTHETIC ABSORBABLE SUTURE: Brand: COATED VICRYL

## (undated) DEVICE — SUT MNCRYL 4/0 PS2 27IN UD MCP426H

## (undated) DEVICE — SENSR O2 OXIMAX FNGR A/ 18IN NONSTR

## (undated) DEVICE — GLV SURG BIOGEL LTX PF 8

## (undated) DEVICE — SPNG GZ WOVN 4X4IN 12PLY 10/BX STRL

## (undated) DEVICE — ST TBG AIRSEAL FLTR TRI LUM

## (undated) DEVICE — SPONGE,LAP,4"X18",XR,ST,5/PK,40PK/CS: Brand: MEDLINE INDUSTRIES, INC.

## (undated) DEVICE — TROC ANCHORPORT BLADELES W/GRIP 12X120MM

## (undated) DEVICE — HLDR PROB URONAV

## (undated) DEVICE — ELECTRD NDL EZ CLN MOD 2.75IN

## (undated) DEVICE — 4.0MM PRECISION ROUND

## (undated) DEVICE — SUT SILK 2/0 FS BLK 18IN 685G

## (undated) DEVICE — APPL HEMO SURG ARISTA/AH/FLEXITIP XL 38CM

## (undated) DEVICE — SHEET,DRAPE,53X77,STERILE: Brand: MEDLINE

## (undated) DEVICE — SKIN AFFIX SURG ADHESIVE 72/CS 0.55ML: Brand: MEDLINE

## (undated) DEVICE — KT SPINE MAZORX DISP

## (undated) DEVICE — DOVER HYDROGEL COATED LATEX FOLEY CATHETER, 5 ML, 3-WAY 18 FR/CH (6.0 MM): Brand: DOVER

## (undated) DEVICE — SPK10277 JACKSON/PRO-AXIS KIT: Brand: SPK10277 JACKSON/PRO-AXIS KIT

## (undated) DEVICE — SYS CLOSE PORTII CARTR/THOMASN XL

## (undated) DEVICE — TIP COVER ACCESSORY

## (undated) DEVICE — CONN FLX BREATHE CIRCT

## (undated) DEVICE — GLV SURG SENSICARE W/ALOE PF LF 7 STRL

## (undated) DEVICE — FRCP BX RADJAW4 NDL 2.8 240 STD OG

## (undated) DEVICE — TBG IRRI TUR Y/TYP NONVENT 98IN LF

## (undated) DEVICE — CONMED SCOPE SAVER BITE BLOCK, 20X27 MM: Brand: SCOPE SAVER

## (undated) DEVICE — CODMAN® SURGICAL STRIPS 1" X 6" (25MM X 152MM): Brand: CODMAN®

## (undated) DEVICE — MAX-CORE® DISPOSABLE CORE BIOPSY INSTRUMENT, 18G X 25CM: Brand: MAX-CORE

## (undated) DEVICE — PK TURNOVER RM ADV

## (undated) DEVICE — DAVINCI: Brand: MEDLINE INDUSTRIES, INC.

## (undated) DEVICE — MASK,OXYGEN,MED CONC,ADLT,7' TUB, UC: Brand: PENDING

## (undated) DEVICE — ANTIBACTERIAL VIOLET BRAIDED (POLYGLACTIN 910), SYNTHETIC ABSORBABLE SUTURE: Brand: COATED VICRYL

## (undated) DEVICE — THE STERILE LIGHT HANDLE COVER IS USED WITH STERIS SURGICAL LIGHTING AND VISUALIZATION SYSTEMS.

## (undated) DEVICE — ELECTRD BLD EZ CLN MOD XLNG 2.75IN

## (undated) DEVICE — Device

## (undated) DEVICE — GLV SURG SENSICARE W/ALOE PF LF SZ6 STRL

## (undated) DEVICE — THE CHANNEL CLEANING BRUSH IS A NYLON FLEXI BRUSH ATTACHED TO A FLEXIBLE PLASTIC SHEATH DESIGNED TO SAFELY REMOVE DEBRIS FROM FLEXIBLE ENDOSCOPES.

## (undated) DEVICE — CABL BIPOL MEGADYNE 12FT DISP

## (undated) DEVICE — TOOL MR8-14MH30D MR8 14CM MATCH DMD 3MM: Brand: MIDAS REX MR8

## (undated) DEVICE — TOOL MR8-31AC75W MR8 31CM ACRN 7.5MM WNG

## (undated) DEVICE — TRAP FLD MINIVAC MEGADYNE 100ML

## (undated) DEVICE — MARKR SKIN W/RULR AND LBL

## (undated) DEVICE — YANKAUER,BULB TIP WITH VENT: Brand: ARGYLE

## (undated) DEVICE — UTILITY MARKER W/MED LABELS: Brand: MEDLINE

## (undated) DEVICE — ENDOPOUCH RETRIEVER SPECIMEN RETRIEVAL BAGS: Brand: ENDOPOUCH RETRIEVER

## (undated) DEVICE — Device: Brand: DEFENDO AIR/WATER/SUCTION AND BIOPSY VALVE

## (undated) DEVICE — SUT SILK 2/0 SH 30IN K833H

## (undated) DEVICE — GLV SURG TRIUMPH ORTHO W/ALOE PF LTX 8 STRL

## (undated) DEVICE — 1LYRTR 16FR10ML100%SIL UMS SNP: Brand: MEDLINE INDUSTRIES, INC.

## (undated) DEVICE — DEFENDO AIR WATER SUCTION AND BIOPSY VALVE KIT FOR  OLYMPUS: Brand: DEFENDO AIR/WATER/SUCTION AND BIOPSY VALVE

## (undated) DEVICE — PREP SOL POVIDONE/IODINE BT 4OZ

## (undated) DEVICE — CLTH CLENS READYCLEANSE PERI CARE PK/5

## (undated) DEVICE — SUT VIC 1 SUTUPAK TIES 18IN J913G

## (undated) DEVICE — 4-PORT MANIFOLD: Brand: NEPTUNE 2

## (undated) DEVICE — THE SINGLE USE ETRAP – POLYP TRAP IS USED FOR SUCTION RETRIEVAL OF ENDOSCOPICALLY REMOVED POLYPS.: Brand: ETRAP

## (undated) DEVICE — ENDOPATH XCEL WITH OPTIVIEW TECHNOLOGY BLADELESS TROCARS WITH STABILITY SLEEVES: Brand: ENDOPATH XCEL OPTIVIEW

## (undated) DEVICE — VAGINAL PREP TRAY: Brand: MEDLINE INDUSTRIES, INC.

## (undated) DEVICE — DRSNG WND SIL OPTIFOAM GNTL BRDR ADHS 1.6X2IN

## (undated) DEVICE — ELECTRD BLD EZ CLN MOD 4IN

## (undated) DEVICE — 3M™ STERI-STRIP™ REINFORCED ADHESIVE SKIN CLOSURES, R1547, 1/2 IN X 4 IN (12 MM X 100 MM), 6 STRIPS/ENVELOPE: Brand: 3M™ STERI-STRIP™

## (undated) DEVICE — ARGYLE YANKAUER BULB TIP WITH VENT: Brand: ARGYLE

## (undated) DEVICE — THE STERILE THE STERIS STERILE CAMERA HANDLE COVERS ARE DESIGNED FOR HARMONYAIR 4K CAMERA MODULE, AND PROVIDE STERILE CONTROL THAT ALLOW FOR INCREASING AND DECREASING ILLUMINATION THROUGH SEVEN INTENSITY LEVELS.

## (undated) DEVICE — SUT VIC 0 MO4 CR8 18IN VCP701D

## (undated) DEVICE — DRSNG WND SIL OPTIFOAM GENTLE BRDR ADHS W/SA 4X4IN

## (undated) DEVICE — PAD,NON-ADHERENT,3X8,STERILE,LF,1/PK: Brand: MEDLINE

## (undated) DEVICE — DRN JP RND NO TROC SIL 15F 3/16IN

## (undated) DEVICE — PROBE 8225101 5PK STD PRASS FL TIP ROHS

## (undated) DEVICE — CATH IV ANGIO FEP 12G 3IN LTBLU 10PK

## (undated) DEVICE — PROXIMATE RH ROTATING HEAD SKIN STAPLERS (35 WIDE) CONTAINS 35 STAINLESS STEEL STAPLES: Brand: PROXIMATE

## (undated) DEVICE — OBT BLADLES ENDOWRIST DAVINCI/S 8MM

## (undated) DEVICE — RESERVOIR,SUCTION,100CC,SILICONE: Brand: MEDLINE